# Patient Record
Sex: MALE | Employment: OTHER | URBAN - METROPOLITAN AREA
[De-identification: names, ages, dates, MRNs, and addresses within clinical notes are randomized per-mention and may not be internally consistent; named-entity substitution may affect disease eponyms.]

---

## 2019-02-11 ENCOUNTER — HOSPITAL ENCOUNTER (OUTPATIENT)
Dept: PHYSICAL THERAPY | Age: 54
Discharge: HOME | End: 2019-02-11
Payer: MEDICARE

## 2019-02-11 VITALS — WEIGHT: 312.8 LBS | HEIGHT: 72 IN | BODY MASS INDEX: 42.37 KG/M2

## 2019-02-11 PROCEDURE — 97162 PT EVAL MOD COMPLEX 30 MIN: CPT

## 2019-02-11 PROCEDURE — 97110 THERAPEUTIC EXERCISES: CPT

## 2019-02-11 PROCEDURE — 97140 MANUAL THERAPY 1/> REGIONS: CPT

## 2019-02-11 NOTE — PROGRESS NOTES
[facility]  Physical Therapy Lymphedema Clinic  [address] [facility], [id number] [place] [address], [facility] 22.    INITIAL EVALUATION    NAME: Adrian Griffin AGE: 52 y.o. GENDER: male  DATE: 2/11/2019  REFERRING PHYSICIAN: Dr. Tamy Ovalle  HISTORY AND BACKGROUND:   Primary Diagnosis:  · B LE lymphedema, secondary (I89.0)  Other Treatment Diagnoses:  · Abnormality of gait (R26.9)  · Swelling not relieved by elevation (R60.9)  ·  COPD (J44.9))  ·  Hyperlipidemia associated with type 2 diabetes mellitus (E11.69, E78.5)  · Morbid obesity (E66.01)  · Atherosclerosis of aorta (I70.0)  · Atrial fibrillation (HCC) (148.91  Date of Onset: 10/11/18  Present Symptoms and Functional Limitations: Patient reports swelling has been present in bilateral legs since at least 2010 and he does report that his sisters and brothers as well as parent have had swelling. He reports that he went to [facility] wound clinic in 2017 and he received compression stockings at that time (ulcer care garments?) that he was able to don and doff himself and he wore daily until they wore out. He reports they helped him heal 2 posterior right leg wounds at that time. He has not worn compression since. Patient reports he donned the stockings himself. He also reports donning and doffing his shoes himself currently. He was admitted to the hospital in September of 2018 secondary to confusion that was attributed to decreased O2 to the brain secondary to sleep apnea. He has a history of CHF and has a reported ejection fraction of 30-35% per study 10/2018. His condition is additionally complicated by reports of bilateral dislocated hips with patient using crutches to ambulate and a power chair for long distances. [facility] Lymphedema Assessment Scale: Score of 7 out of 20.   Past Medical History:   HTN  Atrial fibrillation  Heart Failure with reduced ejection fraction (EF 30-35%)  Atherosclerosis of aorta  COPD  Hyperlipidemia associated with type 2 diabetes mellitus  Morbid Obesity  Obstructive sleep apnea  Hernia  arthritis bilateral hips    Surgeries:  Hernia repair surgery 2010, bilateral THR with subsequent dislocation, Goltry filter placement 2010 ( patient denies having a DVT he reports this was precautionary)  Current Medications:     acetaminophen 500mg  Albuterol sulfate 2.5mg/3ml nebulizer solution  Atorvastatin 40mg   Glucose meter kit  Fluticasone 50mg nasal spary  Furosemide 20mg and 40 mg  Ipatropium bromide . 02% solution  Lancets  Metformin 500mg tablet  Metoprolol tartrate 100mg  Symbicort 160 mg HFA aerosol inhaler  Vontolin HFA 90mcg aerosol inhaler  Vitamin B-1 100mg tablet  Warfarin 7.5mg tablet     No current facility-administered medications for this encounter. Allergies: Penicillin   Prior Level of Function/Social/Work History/Personal factors and/or comorbidities impacting plan of care: Patient is originally from [place]. Worked in security in the past., has children and grandchildren, Mother passed away in  2017. Living Situation: Resides in private residence with steps to enter in the front and back, patient reports it is easier to enter from the back side of the house. Trainable Caregiver?: Yes, sister and granddaughter who reside with him. Self-care/ADLs: Modified independent     Mobility: Modified independent   Sleeping Arrangement: Sleeps in bed at night   Adaptive Equipment Owned: crutches   Other: Patient wears tie on sneakers and he is able to reach his feet to don and doff the products. Previous Therapy:  Traditional physical therapy in the home health setting and nursing wound care.   Compression/Lymphedema Equipment:  Patient reports having stockings in 2017 that he received from [facility] wound healing center that were a 2 layered garment with a zipper (ulcer care stocking?)  He no longer has the stockings but reports he could don and doff them himself. SUBJECTIVE:  I should have come to see you a long time ago. I think I might have 2 wounds on the back of my leg now. I have just been keeping them clean like they taught me. Patients goals for therapy: To get the swelling out of my leg especially the right leg. OBJECTIVE DATA SUMMARY:   EXAMINATION/PRESENTATION/DECISION MAKING:   Pain:    No complaints of swelling related pain today. Pain Scale 1: Numeric (0 - 10)     Pain Intensity 1: 0                 Patient Stated Pain Goal: 0       Self-Care and ADLs:  Grooming: Modified independent  Bathing: Modified independent with sponge bathe and soaking his feet.   Patient does not get into the bathtub/shower for safety reasons   UB Dressing: Modified independent  LB Dressing: Modified independent with increased time and effort   Don/Doff Shoes/Socks: Modified independent with increased time and effort, patient assisted with donning shoes in the clinic and he tied his left sneaker Toileting: Modified independent          Skin and Tissue Assessment:  Dermal Status:  [x]   Intact [x]  Dry   [x]  Tenuous right leg [] Flaky   []  Wound/lesion present []  Scars   []  Dermatitis Bilateral legs are scaly in appearance with right having patches of hyperkatosis on right lower leg and to a lesser extent on the left leg   Texture/Consistency:  []  Boggy []  Pitting Edema   []  Brawny []  Combination   [x]  Fibrotic moderately bilateral lower extremities    Pigmentation/Color Change:  []  Normal []  Hemosiderin   []  Red []  Erythematous   [x]  Hyperpigmented []  Hyperlipodermatosclerosis   Anomalies:  []  Lymphorrhea []  Vesicles   []  Petechiae []  Warty Vercusis   []  Bullae [x]  Papilloma multiple sites right leg   Circulatory:  []  TERRI []  Varicosities:   [x]  Pulses- pedal pulses intact bilateral lower extremities [x]  Vascular studies ruled out DVT in past 9/2018   []  DVT History    Nails:  []   Normal  [x]   Fungus    Right leg         Stemmers Sign: positive  Height:  Height: 6' (182.9 cm)  Weight:  Weight: 141.9 kg (312 lb 12.8 oz)   BMI:  BMI (calculated): 42.4  (36 or greater: adversely affecting lymphedema)  Scale was fluctuating dramatically while patient was standing on it due to his requiring assistance of a walker for support, the weight reported above was the most frequent presentation on the scale today. Wound/Ulcer:  None noted today      Wound Pain:   N/A  Volumetric Measurements:   Right:  20,611.38 mL Left:  20,276.34mL   % Difference: 1.65% right larger than left Dominance: right hand dominant   (See scanned graph)  Range of Motion: Decreased by 20-30% at bilateral ankles, knees and hips. Strength: Grossly 3-3+/5  for bilaterally lower extremities. Sensation:    [x] Intact    [] Impaired  Mobility:     Bed/Chair Mobility:  Standby assistance for supine to sit but Moderate Assistance to raise the legs onto the bed Transfers:  Contact guard assistance    Sitting Balance:  good Standing Balance:  Fair with support   Gait:  Patient did not bring his crutches to ambulate in the clinic used a walker for short distances with decreased step length, decreased waqar and decreased foot clearance as well as increased effort.  Wheelchair Mobility:  N/A patient reports he has a power chair at home but has difficulty transporting it due to his [employer] needing repairs   Endurance:  fair Stairs:  Not assessed, patient reports step to pattern and increased effort with railings needed to successfully ascend 4 steps        Safety:  Patient is alert and oriented:  Yes   Safety awareness:  Good   Fall Risk?:  High due to decreased lower extremity mobility, leg swelling, and dislocated hips   Patient given written fall prevention handout: Yes   Precautions:  Standard lymphedema precautions to include avoiding blood pressure readings, injections and IVs or other procedures/acts that could lead to broken skin on affected area, and avoiding excessive heat, resistive activity or altitude without compression garment       Based on the above components, the patient evaluation is determined to be of the following complexity level: MEDIUM    Evaluation Time: 4113-9120  45 minutes    TREATMENT PROVIDED:   1. Treatment description:  Therapeutic Exercise and Procedure: Patient instructed in activity restrictions and exercise guidelines. Therapist demonstrated deep abdominal breathing and a remedial lymphedema range of motion exercise program to be done in sitting. Modifications were made to the program to remove the bike exercise and resisted hip flexion. Patient was able to complete the routine times 5 reps and deep abdominal breathing with fair performance. Patient has been asked to complete breathing exercises and the decongestive exercise routine daily. Treatment time:  3355-6974  Minutes: 15    2. Treatment description:  Manual Therapy: Patient instructed in skin care principles and anatomy of the lymphatic system. Therapist able to demonstrate manual lymphatic drainage techniques for the drainage of bilateral lower extremities and skin care protocol: using low pH lotion (Remedy lotion). Enforced the need for daily skin care with patient due to his skin status and educated him on his increased risk for skin breakdown. Educated patient in the care of wounds and to perform skin checks daily. Patient reports he has a long handled mirror to check his skin. Instructed him to bathe any open areas with soap and water, apply antibiotic ointment and cover the sites to prevent infection. Educated patient in the signs and symptoms of cellulitis infection and to contact his physician should any symptoms be present. Discussed patient's cardiac status at length with him and that multi-layer compression bandaging would be too aggressive with regard to fluid movement and his cardiac status.   Discussed slow progression of treatment components with education in skin care, elevation and manual lymph drainage followed by progression to include manual lymph drainage education and possibly compression garments. Discussed compression garments with patient and prices of the products as they are not typically covered under Medicare plans. Discussed patient's leg size and he may require a velcro style garment for if reasons as he may not fit into a non custom stocking. Therapist will investigate this for him. Patient expresses that cost is an issue. Patient reports that he would have assistance at home from his sister and granddaughter if needed. Treatment time:  8807-2301  Minutes: 30    ASSESSMENT:   Eboni ConnellLibra Anthony is a 52 y.o. male who presents with bilateral lower extremity secondary lymphedema (right lower extremity stage 3, left lower extremity late stage 2) and a significant cardiac history. Patient does have a history of right lower extremity leg wounds and infection and significantly altered skin status is noted today. Patient has significant mobility limitations and this combined with his cardiac history results in his not being a good candidate for safe multi-layer bandaging. He may be able to use some mild compression in the future. His condition is complicated by a history of CHF with reduced ejection fraction, atrial fibrillation, atherosclerosis of the aorta, morbid obesity and COPD. Patient will benefit from complete decongestive therapy (CDT) including a slow progression of treatment to potentially include manual lymphatic drainage (MLD) technique and a compression system to decongest limb. Patient will receive instruction in proper skin care to recognize signs/symptoms of and prevent infection, therapeutic exercise, and self-MLD for independent home program and restorative lymphatic performance. This care is medically necessary due to the infection risk with lymphedema and to improve functional activities.   CDT is necessary to resolve swelling to allow patient to return to wearing normal clothes/footwear and prevent worsening of symptoms, such as venous stasis ulcerations, infections, or hospitalizations. Patient will be independent with home program strategies to allow improved ADL ability and mobility and to allow patient to return to greatest functional independence. Rehabilitation potential is considered to be fair. Factors which may influence rehabilitation potential include patient's medical status and home compliance with treatment reecommendations. Patient will benefit from 8-10 physical therapy visits over 12-14 weeks to optimize improvement in these areas. PLAN OF CARE:   Recommendations and Planned Interventions:  Manual lymph drainage/complete decongestive therapy  Compression garment fitting/provision  Lymphedema therapeutic exercise  AROM/PROM/Strength/Coordination  Self-care training  Functional mobility training  Education in skin care and lymphedema precautions  Self-MLD education per home program  Self-bandaging education per home program  Caregiver education as needed  Wound care as needed     GOALS  Short term goals  Time frame: 315/19  1. Patient will demonstrate knowledge of signs/symptoms of infections/cellulitis and be independent in skin care to prevent cellulitis. 2.  Patient will demonstrate independence in lymphedema home program of therapeutic exercises to improve circulation and decongest limb to improve ADLs. 3.  Patient will be measured for appropriate compression garments for bilateral lower extremities to help improve skin integrity and minimize worsening of swelling over time. Long term goals  Time frame: 5/8/19  1. Pt will show improvement in the [facility] Lymphedema Assessment Scale by decreasing the score to 5 and thus allow improvement in patient's quality of life. 2.  Patient will be independent with don/doff of compression system and use in order to prevent reaccumulation of fluid at discharge.   3.  Pt will be independent in self-MLD and show stable limb volumes showing decongestion and pt. ready for transition to independent restorative phase of lymphedema therapy. Patient has participated in goal setting and agrees to work toward plan of care. Patient was instructed to call if questions or concerns arise. Thank you for this referral.   SHELLY Bales, CLT-[facility]   Time Calculation: 90 mins    TREATMENT PLAN EFFECTIVE DATES:   2/11/2019 TO 5/8/19  I have read the above plan of care for Wan Arley Griffin. I certify the above prescribed services are required by this patient and are medically necessary.   The above plan of care has been developed in conjunction with the lymphedema/physical therapist.       Physician Signature: ____________________________________Date:______________                                   Dr. Kimberly Luciano

## 2019-05-17 ENCOUNTER — HOSPITAL ENCOUNTER (OUTPATIENT)
Dept: NUCLEAR MEDICINE | Age: 54
Discharge: HOME | DRG: 683 | End: 2019-05-17
Attending: UROLOGY
Payer: MEDICARE

## 2019-05-17 ENCOUNTER — HOSPITAL ENCOUNTER (INPATIENT)
Age: 54
LOS: 4 days | Discharge: HOME | DRG: 683 | End: 2019-05-21
Attending: EMERGENCY MEDICINE | Admitting: FAMILY MEDICINE
Payer: MEDICARE

## 2019-05-17 ENCOUNTER — APPOINTMENT (OUTPATIENT)
Dept: GENERAL RADIOLOGY | Age: 54
DRG: 683 | End: 2019-05-17
Attending: FAMILY MEDICINE
Payer: MEDICARE

## 2019-05-17 ENCOUNTER — HOSPITAL ENCOUNTER (OUTPATIENT)
Dept: CT IMAGING | Age: 54
Discharge: HOME | DRG: 683 | End: 2019-05-17
Attending: UROLOGY
Payer: MEDICARE

## 2019-05-17 DIAGNOSIS — C61 PROSTATE CANCER (HCC): ICD-10-CM

## 2019-05-17 DIAGNOSIS — C61 MALIGNANT NEOPLASM OF PROSTATE (HCC): ICD-10-CM

## 2019-05-17 DIAGNOSIS — N17.9 ACUTE RENAL FAILURE, UNSPECIFIED ACUTE RENAL FAILURE TYPE (HCC): Primary | ICD-10-CM

## 2019-05-17 DIAGNOSIS — N32.0 BLADDER OUTLET OBSTRUCTION: ICD-10-CM

## 2019-05-17 DIAGNOSIS — N13.30 HYDRONEPHROSIS, UNSPECIFIED HYDRONEPHROSIS TYPE: ICD-10-CM

## 2019-05-17 PROBLEM — D64.9 ANEMIA: Status: ACTIVE | Noted: 2019-05-17

## 2019-05-17 PROBLEM — N18.9 ACUTE KIDNEY INJURY SUPERIMPOSED ON CHRONIC KIDNEY DISEASE (HCC): Status: ACTIVE | Noted: 2019-05-17

## 2019-05-17 LAB
ALBUMIN SERPL-MCNC: 2.9 G/DL (ref 3.5–5)
ALBUMIN/GLOB SERPL: 0.7 {RATIO} (ref 1.1–2.2)
ALP SERPL-CCNC: 464 U/L (ref 45–117)
ALT SERPL-CCNC: 12 U/L (ref 12–78)
ANION GAP SERPL CALC-SCNC: 12 MMOL/L (ref 5–15)
APPEARANCE UR: CLEAR
AST SERPL-CCNC: 28 U/L (ref 15–37)
BACTERIA URNS QL MICRO: NEGATIVE /HPF
BASOPHILS # BLD: 0 K/UL (ref 0–0.1)
BASOPHILS NFR BLD: 0 % (ref 0–1)
BILIRUB SERPL-MCNC: 0.8 MG/DL (ref 0.2–1)
BILIRUB UR QL: NEGATIVE
BUN SERPL-MCNC: 31 MG/DL (ref 6–20)
BUN/CREAT SERPL: 8 (ref 12–20)
CALCIUM SERPL-MCNC: 8.8 MG/DL (ref 8.5–10.1)
CHLORIDE SERPL-SCNC: 107 MMOL/L (ref 97–108)
CO2 SERPL-SCNC: 22 MMOL/L (ref 21–32)
COLOR UR: ABNORMAL
COMMENT, HOLDF: NORMAL
CREAT SERPL-MCNC: 3.78 MG/DL (ref 0.7–1.3)
DIFFERENTIAL METHOD BLD: ABNORMAL
EOSINOPHIL # BLD: 0.2 K/UL (ref 0–0.4)
EOSINOPHIL NFR BLD: 3 % (ref 0–7)
EPITH CASTS URNS QL MICRO: ABNORMAL /LPF
ERYTHROCYTE [DISTWIDTH] IN BLOOD BY AUTOMATED COUNT: 16.3 % (ref 11.5–14.5)
GLOBULIN SER CALC-MCNC: 4 G/DL (ref 2–4)
GLUCOSE BLD STRIP.AUTO-MCNC: 85 MG/DL (ref 65–100)
GLUCOSE SERPL-MCNC: 87 MG/DL (ref 65–100)
GLUCOSE UR STRIP.AUTO-MCNC: NEGATIVE MG/DL
HCT VFR BLD AUTO: 27.4 % (ref 36.6–50.3)
HGB BLD-MCNC: 8.7 G/DL (ref 12.1–17)
HGB UR QL STRIP: NEGATIVE
HYALINE CASTS URNS QL MICRO: ABNORMAL /LPF (ref 0–5)
IMM GRANULOCYTES # BLD AUTO: 0 K/UL
IMM GRANULOCYTES NFR BLD AUTO: 0 %
KETONES UR QL STRIP.AUTO: NEGATIVE MG/DL
LEUKOCYTE ESTERASE UR QL STRIP.AUTO: ABNORMAL
LYMPHOCYTES # BLD: 1.1 K/UL (ref 0.8–3.5)
LYMPHOCYTES NFR BLD: 19 % (ref 12–49)
MCH RBC QN AUTO: 30 PG (ref 26–34)
MCHC RBC AUTO-ENTMCNC: 31.8 G/DL (ref 30–36.5)
MCV RBC AUTO: 94.5 FL (ref 80–99)
MONOCYTES # BLD: 0.5 K/UL (ref 0–1)
MONOCYTES NFR BLD: 9 % (ref 5–13)
NEUTS SEG # BLD: 3.9 K/UL (ref 1.8–8)
NEUTS SEG NFR BLD: 69 % (ref 32–75)
NITRITE UR QL STRIP.AUTO: NEGATIVE
NRBC # BLD: 0 K/UL (ref 0–0.01)
NRBC BLD-RTO: 0 PER 100 WBC
PH UR STRIP: 6 [PH] (ref 5–8)
PLATELET # BLD AUTO: 144 K/UL (ref 150–400)
PMV BLD AUTO: 9.9 FL (ref 8.9–12.9)
POTASSIUM SERPL-SCNC: 3.7 MMOL/L (ref 3.5–5.1)
PROT SERPL-MCNC: 6.9 G/DL (ref 6.4–8.2)
PROT UR STRIP-MCNC: ABNORMAL MG/DL
RBC # BLD AUTO: 2.9 M/UL (ref 4.1–5.7)
RBC #/AREA URNS HPF: ABNORMAL /HPF (ref 0–5)
RBC MORPH BLD: ABNORMAL
SAMPLES BEING HELD,HOLD: NORMAL
SERVICE CMNT-IMP: NORMAL
SODIUM SERPL-SCNC: 141 MMOL/L (ref 136–145)
SP GR UR REFRACTOMETRY: 1.01 (ref 1–1.03)
UR CULT HOLD, URHOLD: NORMAL
UROBILINOGEN UR QL STRIP.AUTO: 0.2 EU/DL (ref 0.2–1)
WBC # BLD AUTO: 5.7 K/UL (ref 4.1–11.1)
WBC URNS QL MICRO: ABNORMAL /HPF (ref 0–4)

## 2019-05-17 PROCEDURE — 74011250636 HC RX REV CODE- 250/636

## 2019-05-17 PROCEDURE — 99283 EMERGENCY DEPT VISIT LOW MDM: CPT

## 2019-05-17 PROCEDURE — 93005 ELECTROCARDIOGRAM TRACING: CPT

## 2019-05-17 PROCEDURE — 74011250637 HC RX REV CODE- 250/637: Performed by: FAMILY MEDICINE

## 2019-05-17 PROCEDURE — 81001 URINALYSIS AUTO W/SCOPE: CPT

## 2019-05-17 PROCEDURE — 65660000000 HC RM CCU STEPDOWN

## 2019-05-17 PROCEDURE — 80053 COMPREHEN METABOLIC PANEL: CPT

## 2019-05-17 PROCEDURE — 74011250636 HC RX REV CODE- 250/636: Performed by: FAMILY MEDICINE

## 2019-05-17 PROCEDURE — 36415 COLL VENOUS BLD VENIPUNCTURE: CPT

## 2019-05-17 PROCEDURE — 74176 CT ABD & PELVIS W/O CONTRAST: CPT

## 2019-05-17 PROCEDURE — 82962 GLUCOSE BLOOD TEST: CPT

## 2019-05-17 PROCEDURE — 78306 BONE IMAGING WHOLE BODY: CPT

## 2019-05-17 PROCEDURE — 85025 COMPLETE CBC W/AUTO DIFF WBC: CPT

## 2019-05-17 RX ORDER — HYDRALAZINE HYDROCHLORIDE 20 MG/ML
INJECTION INTRAMUSCULAR; INTRAVENOUS
Status: COMPLETED
Start: 2019-05-17 | End: 2019-05-17

## 2019-05-17 RX ORDER — ALBUTEROL SULFATE 90 UG/1
1 AEROSOL, METERED RESPIRATORY (INHALATION)
Status: ON HOLD | COMMUNITY
Start: 2019-05-10 | End: 2019-05-21 | Stop reason: SDUPTHER

## 2019-05-17 RX ORDER — ATORVASTATIN CALCIUM 40 MG/1
40 TABLET, FILM COATED ORAL
Status: DISCONTINUED | OUTPATIENT
Start: 2019-05-18 | End: 2019-05-21 | Stop reason: HOSPADM

## 2019-05-17 RX ORDER — BICALUTAMIDE 50 MG/1
TABLET, FILM COATED ORAL
Refills: 6 | COMMUNITY
Start: 2019-05-16 | End: 2019-05-17

## 2019-05-17 RX ORDER — FUROSEMIDE 40 MG/1
40 TABLET ORAL DAILY
COMMUNITY
Start: 2019-05-10 | End: 2019-05-21

## 2019-05-17 RX ORDER — INSULIN LISPRO 100 [IU]/ML
INJECTION, SOLUTION INTRAVENOUS; SUBCUTANEOUS
Status: DISCONTINUED | OUTPATIENT
Start: 2019-05-17 | End: 2019-05-21 | Stop reason: HOSPADM

## 2019-05-17 RX ORDER — SODIUM CHLORIDE 0.9 % (FLUSH) 0.9 %
5-40 SYRINGE (ML) INJECTION EVERY 8 HOURS
Status: DISCONTINUED | OUTPATIENT
Start: 2019-05-18 | End: 2019-05-21 | Stop reason: HOSPADM

## 2019-05-17 RX ORDER — ALBUTEROL SULFATE 0.83 MG/ML
2.5 SOLUTION RESPIRATORY (INHALATION)
COMMUNITY
Start: 2019-05-10

## 2019-05-17 RX ORDER — BICALUTAMIDE 50 MG/1
50 TABLET, FILM COATED ORAL DAILY
Status: DISCONTINUED | OUTPATIENT
Start: 2019-05-18 | End: 2019-05-21 | Stop reason: HOSPADM

## 2019-05-17 RX ORDER — FLUTICASONE PROPIONATE 50 MCG
2 SPRAY, SUSPENSION (ML) NASAL DAILY
Status: ON HOLD | COMMUNITY
Start: 2019-05-10 | End: 2019-05-21 | Stop reason: SDUPTHER

## 2019-05-17 RX ORDER — METFORMIN HYDROCHLORIDE 500 MG/1
TABLET ORAL
COMMUNITY
Start: 2019-05-10 | End: 2019-05-17

## 2019-05-17 RX ORDER — MAGNESIUM SULFATE 100 %
4 CRYSTALS MISCELLANEOUS AS NEEDED
Status: DISCONTINUED | OUTPATIENT
Start: 2019-05-17 | End: 2019-05-21 | Stop reason: HOSPADM

## 2019-05-17 RX ORDER — DEXTROSE 50 % IN WATER (D50W) INTRAVENOUS SYRINGE
25-50 AS NEEDED
Status: DISCONTINUED | OUTPATIENT
Start: 2019-05-17 | End: 2019-05-21 | Stop reason: HOSPADM

## 2019-05-17 RX ORDER — IPRATROPIUM BROMIDE 0.5 MG/2.5ML
0.5 SOLUTION RESPIRATORY (INHALATION)
COMMUNITY
Start: 2019-05-10

## 2019-05-17 RX ORDER — FUROSEMIDE 20 MG/1
20 TABLET ORAL DAILY
COMMUNITY
Start: 2019-05-10 | End: 2019-05-21

## 2019-05-17 RX ORDER — METOPROLOL SUCCINATE 50 MG/1
100 TABLET, EXTENDED RELEASE ORAL DAILY
Status: DISCONTINUED | OUTPATIENT
Start: 2019-05-18 | End: 2019-05-21 | Stop reason: HOSPADM

## 2019-05-17 RX ORDER — BICALUTAMIDE 50 MG/1
50 TABLET, FILM COATED ORAL DAILY
COMMUNITY

## 2019-05-17 RX ORDER — HYDRALAZINE HYDROCHLORIDE 20 MG/ML
10 INJECTION INTRAMUSCULAR; INTRAVENOUS ONCE
Status: COMPLETED | OUTPATIENT
Start: 2019-05-17 | End: 2019-05-17

## 2019-05-17 RX ORDER — HEPARIN SODIUM 5000 [USP'U]/ML
5000 INJECTION, SOLUTION INTRAVENOUS; SUBCUTANEOUS EVERY 8 HOURS
Status: DISCONTINUED | OUTPATIENT
Start: 2019-05-17 | End: 2019-05-21 | Stop reason: HOSPADM

## 2019-05-17 RX ORDER — SODIUM CHLORIDE 0.9 % (FLUSH) 0.9 %
5-40 SYRINGE (ML) INJECTION AS NEEDED
Status: DISCONTINUED | OUTPATIENT
Start: 2019-05-17 | End: 2019-05-21 | Stop reason: HOSPADM

## 2019-05-17 RX ORDER — METOPROLOL SUCCINATE 100 MG/1
TABLET, EXTENDED RELEASE ORAL
Refills: 0 | Status: ON HOLD | COMMUNITY
Start: 2019-05-16 | End: 2019-05-21 | Stop reason: SDUPTHER

## 2019-05-17 RX ORDER — ATORVASTATIN CALCIUM 40 MG/1
40 TABLET, FILM COATED ORAL DAILY
COMMUNITY
Start: 2019-05-10

## 2019-05-17 RX ADMIN — ATORVASTATIN CALCIUM 40 MG: 40 TABLET, FILM COATED ORAL at 23:32

## 2019-05-17 RX ADMIN — HYDRALAZINE HYDROCHLORIDE 10 MG: 20 INJECTION INTRAMUSCULAR; INTRAVENOUS at 21:51

## 2019-05-17 RX ADMIN — Medication 10 ML: at 23:33

## 2019-05-17 RX ADMIN — HEPARIN SODIUM 5000 UNITS: 5000 INJECTION INTRAVENOUS; SUBCUTANEOUS at 23:32

## 2019-05-17 NOTE — H&P
History & Physical 
 
Date of admission: 5/17/2019 Patient name: Sal Bond MRN: [id number] YOB: 1965 Age: 62 y.o. Primary care provider:  Justin Griffith MD  
 
Source of Information: patient, ED and electronic medical records Chief complaint:  Abnormal CT scan results History of present illness Sal Bond is a 62 y.o.  male with past medical history of atrial fibrillation, CHF, COPD, type 2 DM, hypertension, prostate enlargement presented to the ED from outpatient radiology with abnormal CT abdomen/ pelvis. Medical records are limited and patient is a limited historian. Patient's girlfriend provided some limited history. Remainder of history was obtained per review of ED records. Per these collective reports,  patient has known history of an enlarged prostate. Patient was reportedly seen by his PCP on 5/13/2019. He notedly had a elevated PSA. Patient was referred to and seen by urologist Dr. Gayatri Garcia on 5/14/2019 and prescribed Casodex for presumed prostate cancer. Patient reportedly was seen again by his PCP on 5/16/2019 and labs were done. He was scheduled for CT abdomen/ pelvis today. Labs showed elevated elevated creatinine. Today, CT abdomen/pelvis without contrast suggested metastatic prostate cancer with sherrie and osseous metastates, bilateral hydroureteronephrosis with bladder outlet obstruction. ED then consulted hospitalist.   
 
There were no reports of new onset symptoms of syncope, loss of consciousness, headache, neck pain, visual disturbance, numbness, paresthesias, focal weakness, slurred speech, facial droop, shortness of breath (compared to baseline), chest pain, palpitations, abdominal pain, nausea, vomiting, diarrhea, calf pain, increased leg swelling/ edema, fever, chills. Past Medical History:  
Diagnosis Date  Atrial fibrillation ([facility] [address]  CHF (congestive heart failure) ([facility] [address]  Chronic obstructive pulmonary disease ([facility] [address]  Diabetes ([facility] [address] pre-diabetic on metformin  Hypertension  Prostate enlargement -  Prostate cancer 
 -  CKD -  Right thigh abscess- s/p I&D abscess 2010 in [place] -  Lymphedema. Seen by PT for the same 
 -  RLE DVT. S/p IVC filter in 2019 due to bleeding associated with extensive infection RLE 
 -  Morbid obesity -  Chronic left hip dislocation s/p L THR 
 -  DJD 
 -  Gait abnormality 
 -  Ventral and inguinal hernias- s/p repair PAST SURGICAL HISTORY: 
Past Surgical History:  
Procedure Laterality Date  HX HERNIA REPAIR    
 ventral and inguinal  
 HX ORTHOPAEDIC    
 hip surgery MEDICATIONS: 
Prior to Admission medications Medication Sig Start Date End Date Taking? Authorizing Provider  
albuterol (PROVENTIL VENTOLIN) 2.5 mg /3 mL (0.083 %) nebulizer solution  5/10/19   Other, MD Melba  
VENTOLIN HFA 90 mcg/actuation inhaler  5/10/19   Other, MD Melba  
atorvastatin (LIPITOR) 40 mg tablet  5/10/19   Other, MD Melba  
bicalutamide (CASODEX) 50 mg tablet TK 1 T PO QD 5/16/19   Other, MD Melba  
fluticasone propionate (FLONASE) 50 mcg/actuation nasal spray  5/10/19   Other, MD Melba  
furosemide (LASIX) 20 mg tablet  5/10/19   Other, MD Melba  
furosemide (LASIX) 40 mg tablet  5/10/19   Other, MD Melba  
ipratropium (ATROVENT) 0.02 % soln  5/10/19   Other, MD Melba  
metoprolol succinate (TOPROL-XL) 100 mg tablet TK 1 T PO QD 5/16/19   Other, MD Melba  
metFORMIN (GLUCOPHAGE) 500 mg tablet  5/10/19   Other, MD Melba  
 
ALLERGIES: 
NO KNOWN DRUG ALLERGIES Social history Patient resides X  with girlfriend Ambulates 
x  crutches Alcohol history  
x  occasional alcohol binges 3-4 beers / day on weekends but denies daily consumption Smoking history 
x  denies Social History Tobacco Use  
 Smoking Status Never Smoker Smokeless Tobacco Never Used Code status 
x  Full code Review of systems Pertinent positives as noted in HPI. All other systems were reviewed and were negative Physical Examination Visit Vitals BP (!) 178/107 Pulse 65 Temp 97.8 °F (36.6 °C) Resp 15 SpO2 98% General:  Morbidly obese male in no acute respiratory distress Head:  Normocephalic, without obvious abnormality, atraumatic Eyes:  Conjunctivae/corneas clear. PERRL, EOMs intact E/N/M/T: Nares normal. Septum midline. No nasal drainage or sinus tenderness Tongue midline/ non-edematous Clear oropharynx Neck: Normal appearance and movements, symmetrical, trachea midline No palpable adenopathy No thyroid enlargement, tenderness or nodules No carotid bruit No JVP Lungs:   Symmetrical chest expansion and respiratory effort Clear to auscultation bilaterally Chest wall:  No tenderness or deformity Heart:  Regular rate and rhythm Sounds normal; no murmur, click, rub or gallop Abdomen:   Soft, obese No tenderness No rebound, guarding, or rigidity Non-distended Bowel sounds normal 
Ventral / supraumbilical prominence Old scar infraumbilical region No auscultated abdominal bruits No pulsatile abdominal mass Back: No CVA tenderness Extremities: Massive lymphedema with chronic venous stasis discoloration of both legs and hardening of skin Vascular/ 
Pulses: 2+ radial/ 1+ DP bilateral pulses Skin: No rashes or ulcers Warm/ dry Musculo- 
    skeletal: Gait not tested Limited range of motion of BLE and required assistance both standing and transferring to stretcher from wheelchair Neuro: GCS 15. Moves all extremities x 4. No slurred speech. No facial droop. Sensation grossly intact except decreased on tactile stimuli on plantar surface of both feet Psych: Alert, oriented x 3 Data Review 24 Hour Results: Recent Results (from the past 24 hour(s)) CBC WITH AUTOMATED DIFF Collection Time: 05/17/19  3:04 PM  
Result Value Ref Range WBC 5.7 4.1 - 11.1 K/uL  
 RBC 2.90 (L) 4.10 - 5.70 M/uL HGB 8.7 (L) 12.1 - 17.0 g/dL HCT 27.4 (L) 36.6 - 50.3 % MCV 94.5 80.0 - 99.0 FL  
 MCH 30.0 26.0 - 34.0 PG  
 MCHC 31.8 30.0 - 36.5 g/dL  
 RDW 16.3 (H) 11.5 - 14.5 % PLATELET 866 (L) 690 - 400 K/uL MPV 9.9 8.9 - 12.9 FL  
 NRBC 0.0 0  WBC ABSOLUTE NRBC 0.00 0.00 - 0.01 K/uL NEUTROPHILS 69 32 - 75 % LYMPHOCYTES 19 12 - 49 % MONOCYTES 9 5 - 13 % EOSINOPHILS 3 0 - 7 % BASOPHILS 0 0 - 1 % IMMATURE GRANULOCYTES 0 %  
 ABS. NEUTROPHILS 3.9 1.8 - 8.0 K/UL  
 ABS. LYMPHOCYTES 1.1 0.8 - 3.5 K/UL  
 ABS. MONOCYTES 0.5 0.0 - 1.0 K/UL  
 ABS. EOSINOPHILS 0.2 0.0 - 0.4 K/UL  
 ABS. BASOPHILS 0.0 0.0 - 0.1 K/UL  
 ABS. IMM. GRANS. 0.0 K/UL  
 DF MANUAL    
 RBC COMMENTS ANISOCYTOSIS 
1+ METABOLIC PANEL, COMPREHENSIVE Collection Time: 05/17/19  3:04 PM  
Result Value Ref Range Sodium 141 136 - 145 mmol/L Potassium 3.7 3.5 - 5.1 mmol/L Chloride 107 97 - 108 mmol/L  
 CO2 22 21 - 32 mmol/L Anion gap 12 5 - 15 mmol/L Glucose 87 65 - 100 mg/dL BUN 31 (H) 6 - 20 MG/DL Creatinine 3.78 (H) 0.70 - 1.30 MG/DL  
 BUN/Creatinine ratio 8 (L) 12 - 20 GFR est AA 20 (L) >60 ml/min/1.73m2 GFR est non-AA 17 (L) >60 ml/min/1.73m2 Calcium 8.8 8.5 - 10.1 MG/DL Bilirubin, total 0.8 0.2 - 1.0 MG/DL  
 ALT (SGPT) 12 12 - 78 U/L  
 AST (SGOT) 28 15 - 37 U/L Alk. phosphatase 464 (H) 45 - 117 U/L Protein, total 6.9 6.4 - 8.2 g/dL Albumin 2.9 (L) 3.5 - 5.0 g/dL Globulin 4.0 2.0 - 4.0 g/dL A-G Ratio 0.7 (L) 1.1 - 2.2 SAMPLES BEING HELD Collection Time: 05/17/19  3:04 PM  
Result Value Ref Range SAMPLES BEING HELD 1red, 1blu 1PST COMMENT   Add-on orders for these samples will be processed based on acceptable specimen integrity and analyte stability, which may vary by analyte. URINALYSIS W/MICROSCOPIC Collection Time: 05/17/19  5:03 PM  
Result Value Ref Range Color YELLOW/STRAW Appearance CLEAR CLEAR Specific gravity 1.008 1.003 - 1.030    
 pH (UA) 6.0 5.0 - 8.0 Protein TRACE (A) NEG mg/dL Glucose NEGATIVE  NEG mg/dL Ketone NEGATIVE  NEG mg/dL Bilirubin NEGATIVE  NEG Blood NEGATIVE  NEG Urobilinogen 0.2 0.2 - 1.0 EU/dL Nitrites NEGATIVE  NEG Leukocyte Esterase TRACE (A) NEG    
 WBC 0-4 0 - 4 /hpf  
 RBC 0-5 0 - 5 /hpf Epithelial cells FEW FEW /lpf Bacteria NEGATIVE  NEG /hpf Hyaline cast 0-2 0 - 5 /lpf URINE CULTURE HOLD SAMPLE Collection Time: 05/17/19  5:03 PM  
Result Value Ref Range Urine culture hold URINE ON HOLD IN MICROBIOLOGY DEPT FOR 3 DAYS. IF UNPRESERVED URINE IS SUBMITTED, IT CANNOT BE USED FOR ADDITIONAL TESTING AFTER 24 HRS, RECOLLECTION WILL BE REQUIRED. Recent Labs 05/17/19 
1504 WBC 5.7 HGB 8.7* HCT 27.4*  
* Recent Labs 05/17/19 
1504   
K 3.7  CO2 22 GLU 87 BUN 31* CREA 3.78* CA 8.8 ALB 2.9* TBILI 0.8 SGOT 28 ALT 12 Imaging CT ABD PELV WO CONT 
  
INDICATION: Malignant neoplasm of prostate (HCC) 
  
COMPARISON: None. 
  
CONTRAST:  None. 
  
TECHNIQUE:  
Thin axial images were obtained through the abdomen and pelvis. Coronal and 
sagittal reconstructions were generated. Oral contrast was not administered. CT 
dose reduction was achieved through use of a standardized protocol tailored for 
this examination and automatic exposure control for dose modulation.  
  
The absence of intravenous contrast material reduces the sensitivity for 
evaluation of the solid parenchymal organs of the abdomen.  
  
FINDINGS:  
LUNG BASES: Clear. INCIDENTALLY IMAGED HEART AND MEDIASTINUM: Normal in size. Small pericardial 
fluid. LIVER: No mass or biliary dilatation. GALLBLADDER: Unremarkable. SPLEEN: No mass. PANCREAS: No mass or ductal dilatation. ADRENALS: Unremarkable. KIDNEYS/URETERS: Bilateral hydronephrosis and hydroureter extending to the 
urinary bladder. STOMACH: Unremarkable. SMALL BOWEL: No dilatation or wall thickening. COLON: No dilatation or wall thickening. APPENDIX: Unremarkable. PERITONEUM: No ascites or pneumoperitoneum. RETROPERITONEUM: There is retroperitoneal adenopathy with 1.8 x 1.8 cm left 
periaortic node (3, 47), 1.5 x 2.1 cm posterior aortocaval node (3, 49), 1.4 x 
2.2 cm anterior aortocaval node (3, 56), probable 2.1 x 3.1 cm aortic 
bifurcation node (3, 62), 2.2 x 2.2 cm right common iliac node (3, 66), 1.5 x 
2.2 cm left common iliac node (3, 65), 1.5 x 3.1 cm left pelvic sidewall node 
(3, 70), 1.6 x 2.3 cm anterior right pelvic sidewall node (3, 74), 3.9 x 4.0 cm 
right pelvic sidewall lymph node (3, 75), and 2.8 x 3.7 cm left inguinal region 
node (3, 84). No aneurysm. IVC filter noted. REPRODUCTIVE ORGANS: Partially obscured by streak artifact from bilateral hip 
arthroplasty prostheses. Prostate appears prominent and may project into the 
bladder base. URINARY BLADDER: No mass or calculus. BONES: Diffuse sclerotic metastases. There is dislocation with superior and 
posterior displacement of the left hip arthroplasty prosthesis and lateral 
subluxation of the femoral component of the right hip arthroplasty prosthesis 
which is eccentrically located superiorly in the acetabular component. ADDITIONAL COMMENTS: Intensive venous collaterals in the anterior abdominal 
wall. 
  
IMPRESSION: 
  
1. Extensive sherrie and osseous metastases likely reflective of metastatic 
prostate neoplasm. 2. Bilateral hydronephrosis likely reflecting urinary bladder outlet obstruction 
given suspected prostate cancer projection into the bladder base. 3. IVC filter with extensive superficial abdominal wall collaterals likely reflects IVC occlusion at the level of filter, but this is not well evaluated on 
unenhanced exam. 
4. Dislocation of left hip arthroplasty prosthesis and capsular wear with 
superior and lateral subluxation of right hip arthroplasty prosthesis. Assessment and Plan 1. Extensive sherrie and osseous metastases likely reflective of metastatic 
prostate neoplasm. 
-  Admit to remote telemetry -  Consult urologist stat -  Currently on Casodex per urologist Dr. Garald Spatz -  Discussed CT scan results with patient and his girlfriend and I answered their questions -  Consult oncologist 
 
2. Bilateral hydronephrosis likely reflecting urinary bladder outlet obstruction 
given suspected prostate cancer projection into the bladder base. -  Plan as above 3. IVC filter with extensive superficial abdominal wall collaterals likely 
reflects IVC occlusion at the level of filter, but this is not well evaluated on 
unenhanced exam. 
-  Likely chronic given presence of collaterals noted on CT 4. Dislocation of left hip arthroplasty prosthesis and capsular wear with 
superior and lateral subluxation of right hip arthroplasty prosthesis. -  notedly chronic since 2000 with no plans for further hip replacements d/t multiple co-morbidities per patient report 5. Acute superimposed on chronic kidney disease - stage 4.   
-   Consult nephrologist in a.m. -   Repeat renal panel -   Place on strict Is & Os  
-   Daily weights -   Order bladder scan 6. Anemia.   
-  Repeat H/H 
-  Check iron profile/ B12/ folate levels -  Order stool occult blood test 
 
7. Thrombocytopenia- mild. -  Repeat platelet count 8. Elevated alkaline phosphotase. Likely reflecting osseous metastatic disease. 9.  History of CHF. -  Order 2d echocardiogram in a.m. -  Consider for cardiologist consultation 10. COPD. No acute exacerbation -  Duoneb treatments prn 11. Hypertension. Currently uncontrolled -  Resume home medication on Toprol XL 
-  Order additional anti-hypertensive therapy as needed -  Monitor BP closely 12. Atrial fibrillation -  Ordered stat 12 lead EKG 
 -  Not oral anti-coagulant therapy 13. Lymphedema -  Chronic 14. Type 2 DM. -  Order Humalog insulin correctional coverage, scheduled blood glucose checks and check hemoglobin A1c level. 15.  Generalized weakness/ physical debility- with gait abnormality 
 -   Fall precautions Diet: renal/ diabetic Activity: OOB as tolerated with assistance DVT prophylaxis: SCDs contraindicated with h/o DVT/ massive lymphedema with occluded IVC; as still at risk for PE with sedentary patient, will order Heparin 5,000 units sq q 8 hours but monitor platelet counts closely.  
  
 
 
Signed by: Michelle Jessica MD  
 May 17, 2019 at 7:29 PM

## 2019-05-17 NOTE — ED PROVIDER NOTES
Initial Complaint: referral from CT for IV hydration. PCP called patient while he was still in radiology and advised him that his kidney function was elevated and needed to be seen. Started: today Endorses: no symptoms. Was sent for CT to evaluate his prostate. Denies: any symptoms No further complaints. Past Medical History: 
No date: CHF (congestive heart failure) ([facility] [address] No date: Chronic obstructive pulmonary disease ([facility] [address] No date: Diabetes ([facility] [address] Comment:  pre-diabetic on metformin No date: Hypertension No date: Prostate enlargement Past Surgical History: 
No date: HX HERNIA REPAIR Comment:  ventral and inguinal 
No date: HX ORTHOPAEDIC Comment:  hip surgery Reviewed Primary care provider: Chani Cuellar MD 
 
Note from PCP: Poor medical compliance. Elevated renal Fxn last week with creatinine at 2. 9. Yesterday blood work redrawn and now has a creatinine at 3.6. Sent for evaluation. Likely has prostate CA as his PSA is very high. Patient states he is asymptomatic. The history is provided by the patient. No  was used. Past Medical History:  
Diagnosis Date  CHF (congestive heart failure) ([facility] [address]  Chronic obstructive pulmonary disease ([facility] [address]  Hypertension  Prostate enlargement Past Surgical History:  
Procedure Laterality Date  HX HERNIA REPAIR    
 ventral and inguinal  
 HX ORTHOPAEDIC    
 hip surgery No family history on file. Social History Socioeconomic History  Marital status: SINGLE Spouse name: Not on file  Number of children: Not on file  Years of education: Not on file  Highest education level: Not on file Occupational History  Not on file Social Needs  Financial resource strain: Not on file  Food insecurity:  
  Worry: Not on file Inability: Not on file  Transportation needs:  
  Medical: Not on file Non-medical: Not on file Tobacco Use  
  Smoking status: Not on file Substance and Sexual Activity  Alcohol use: Not on file  Drug use: Not on file  Sexual activity: Not on file Lifestyle  Physical activity:  
  Days per week: Not on file Minutes per session: Not on file  Stress: Not on file Relationships  Social connections:  
  Talks on phone: Not on file Gets together: Not on file Attends L[employer]-day service: Not on file Active member of club or organization: Not on file Attends meetings of clubs or organizations: Not on file Relationship status: Not on file  Intimate partner violence:  
  Fear of current or ex partner: Not on file Emotionally abused: Not on file Physically abused: Not on file Forced sexual activity: Not on file Other Topics Concern  Not on file Social History Narrative  Not on file ALLERGIES: Patient has no known allergies. Review of Systems Constitutional: Negative for chills, fatigue and fever. Respiratory: Negative for shortness of breath. Cardiovascular: Negative for chest pain. Gastrointestinal: Negative for constipation, diarrhea, nausea and vomiting. Genitourinary: Negative for decreased urine volume and difficulty urinating. Musculoskeletal: Negative for arthralgias and myalgias. Skin: Negative. Neurological: Negative for dizziness and headaches. Psychiatric/Behavioral: Negative. All other systems reviewed and are negative. Vitals:  
 05/17/19 1425 05/17/19 1451 BP:  (!) 162/97 Pulse: 70 65 Resp:  15 Temp:  97.8 °F (36.6 °C) SpO2: 95% 98% Physical Exam  
Constitutional: He is oriented to person, place, and time. He appears well-developed and well-nourished. Non-toxic appearance. He does not have a sickly appearance. He does not appear ill. No distress. 61 YO obese male in NAD HENT:  
Head: Normocephalic and atraumatic. Neck: Normal range of motion. Neck supple. Cardiovascular: Normal rate, regular rhythm, normal heart sounds and intact distal pulses. Exam reveals no gallop. No murmur heard. Pulses: 
     Radial pulses are 2+ on the right side, and 2+ on the left side. BLE +4 non-pitting. Unable to feel pulses Pulmonary/Chest: Effort normal and breath sounds normal. No stridor. No respiratory distress. He has no decreased breath sounds. He has no wheezes. He has no rales. He exhibits no tenderness. Abdominal: Soft. Bowel sounds are normal. There is no tenderness. There is no guarding. Musculoskeletal: Normal range of motion. Neurological: He is alert and oriented to person, place, and time. Skin: Skin is warm and dry. No erythema. Psychiatric: He has a normal mood and affect. His behavior is normal. Judgment and thought content normal.  
Nursing note and vitals reviewed. [facility] Procedures Assessment & Plan:  
 
Orders Placed This Encounter  URINE CULTURE HOLD SAMPLE  
 CBC WITH AUTOMATED DIFF  
 METABOLIC PANEL, COMPREHENSIVE  
 URINALYSIS W/MICROSCOPIC  
[facility]  albuterol (PROVENTIL VENTOLIN) 2.5 mg /3 mL (0.083 %) nebulizer solution  VENTOLIN HFA 90 mcg/actuation inhaler  atorvastatin (LIPITOR) 40 mg tablet  bicalutamide (CASODEX) 50 mg tablet  fluticasone propionate (FLONASE) 50 mcg/actuation nasal spray  furosemide (LASIX) 20 mg tablet  furosemide (LASIX) 40 mg tablet  ipratropium (ATROVENT) 0.02 % soln  metoprolol succinate (TOPROL-XL) 100 mg tablet  metFORMIN (GLUCOPHAGE) 500 mg tablet Discussed with Aline Galan MD,ED Provider Patricio Abdullahi NP 
05/17/19 
2:57 PM 
 
Hospitalist Leif for Admission 6:22 PM 
 
ED Room Number: R37/R37 Patient Name and age:  Corey Sees 62 y.o.  male Working Diagnosis: 1. Acute renal failure, unspecified acute renal failure type ([address] 2. Hydronephrosis, unspecified hydronephrosis type 3. Prostate cancer ([address] 4. Bladder outlet obstruction Readmission: no 
Isolation Requirements:  no 
Recommended Level of Care:  med/surg Code Status:  Full Other:  Sent by PCP for imaging. Had worsening Kidney function since last week. Was advised after CT to come to the ED. Out CT listed below. Creatinine one week ago was 2.9. Now 3.78. CT Abd: 
1Extensive sherrie and osseous metastases likely reflective of metastatic 
prostate neoplasm. 2. Bilateral hydronephrosis likely reflecting urinary bladder outlet obstruction 
given suspected prostate cancer projection into the bladder base. 3. IVC filter with extensive superficial abdominal wall collaterals likely 
reflects IVC occlusion at the level of filter, but this is not well evaluated on 
unenhanced exam. 
 
 
7:06 PM 
Patient is being admitted to the hospital.  The results of their tests and reasons for their admission have been discussed with them and/or available family. They convey agreement and understanding for the need to be admitted and for their admission diagnosis. Consultation has been made with the inpatient physician specialist for hospitalization. LABORATORY TESTS: 
Recent Results (from the past 12 hour(s)) CBC WITH AUTOMATED DIFF Collection Time: 05/17/19  3:04 PM  
Result Value Ref Range WBC 5.7 4.1 - 11.1 K/uL  
 RBC 2.90 (L) 4.10 - 5.70 M/uL HGB 8.7 (L) 12.1 - 17.0 g/dL HCT 27.4 (L) 36.6 - 50.3 % MCV 94.5 80.0 - 99.0 FL  
 MCH 30.0 26.0 - 34.0 PG  
 MCHC 31.8 30.0 - 36.5 g/dL  
 RDW 16.3 (H) 11.5 - 14.5 % PLATELET 982 (L) 400 - 400 K/uL MPV 9.9 8.9 - 12.9 FL  
 NRBC 0.0 0  WBC ABSOLUTE NRBC 0.00 0.00 - 0.01 K/uL NEUTROPHILS 69 32 - 75 % LYMPHOCYTES 19 12 - 49 % MONOCYTES 9 5 - 13 % EOSINOPHILS 3 0 - 7 % BASOPHILS 0 0 - 1 % IMMATURE GRANULOCYTES 0 %  
 ABS. NEUTROPHILS 3.9 1.8 - 8.0 K/UL  
 ABS. LYMPHOCYTES 1.1 0.8 - 3.5 K/UL  
 ABS. MONOCYTES 0.5 0.0 - 1.0 K/UL  
 ABS. EOSINOPHILS 0.2 0.0 - 0.4 K/UL ABS. BASOPHILS 0.0 0.0 - 0.1 K/UL  
 ABS. IMM. GRANS. 0.0 K/UL  
 DF MANUAL    
 RBC COMMENTS ANISOCYTOSIS 
1+ METABOLIC PANEL, COMPREHENSIVE Collection Time: 05/17/19  3:04 PM  
Result Value Ref Range Sodium 141 136 - 145 mmol/L Potassium 3.7 3.5 - 5.1 mmol/L Chloride 107 97 - 108 mmol/L  
 CO2 22 21 - 32 mmol/L Anion gap 12 5 - 15 mmol/L Glucose 87 65 - 100 mg/dL BUN 31 (H) 6 - 20 MG/DL Creatinine 3.78 (H) 0.70 - 1.30 MG/DL  
 BUN/Creatinine ratio 8 (L) 12 - 20 GFR est AA 20 (L) >60 ml/min/1.73m2 GFR est non-AA 17 (L) >60 ml/min/1.73m2 Calcium 8.8 8.5 - 10.1 MG/DL Bilirubin, total 0.8 0.2 - 1.0 MG/DL  
 ALT (SGPT) 12 12 - 78 U/L  
 AST (SGOT) 28 15 - 37 U/L Alk. phosphatase 464 (H) 45 - 117 U/L Protein, total 6.9 6.4 - 8.2 g/dL Albumin 2.9 (L) 3.5 - 5.0 g/dL Globulin 4.0 2.0 - 4.0 g/dL A-G Ratio 0.7 (L) 1.1 - 2.2 SAMPLES BEING HELD Collection Time: 05/17/19  3:04 PM  
Result Value Ref Range SAMPLES BEING HELD 1red, 1blu COMMENT Add-on orders for these samples will be processed based on acceptable specimen integrity and analyte stability, which may vary by analyte. URINALYSIS W/MICROSCOPIC Collection Time: 05/17/19  5:03 PM  
Result Value Ref Range Color YELLOW/STRAW Appearance CLEAR CLEAR Specific gravity 1.008 1.003 - 1.030    
 pH (UA) 6.0 5.0 - 8.0 Protein TRACE (A) NEG mg/dL Glucose NEGATIVE  NEG mg/dL Ketone NEGATIVE  NEG mg/dL Bilirubin NEGATIVE  NEG Blood NEGATIVE  NEG Urobilinogen 0.2 0.2 - 1.0 EU/dL Nitrites NEGATIVE  NEG Leukocyte Esterase TRACE (A) NEG    
 WBC 0-4 0 - 4 /hpf  
 RBC 0-5 0 - 5 /hpf Epithelial cells FEW FEW /lpf Bacteria NEGATIVE  NEG /hpf Hyaline cast 0-2 0 - 5 /lpf URINE CULTURE HOLD SAMPLE Collection Time: 05/17/19  5:03 PM  
Result Value Ref Range Urine culture hold URINE ON HOLD IN MICROBIOLOGY DEPT FOR 3 DAYS. IF UNPRESERVED URINE IS SUBMITTED, IT CANNOT BE USED FOR ADDITIONAL TESTING AFTER 24 HRS, RECOLLECTION WILL BE REQUIRED. IMAGING RESULTS: 
No orders to display Ct Abd Pelv Wo Cont Result Date: 5/17/2019 EXAM: CT ABD PELV WO CONT INDICATION: Malignant neoplasm of prostate (HCC) COMPARISON: None. CONTRAST:  None. TECHNIQUE: Thin axial images were obtained through the abdomen and pelvis. Coronal and sagittal reconstructions were generated. Oral contrast was not administered. CT dose reduction was achieved through use of a standardized protocol tailored for this examination and automatic exposure control for dose modulation. The absence of intravenous contrast material reduces the sensitivity for evaluation of the solid parenchymal organs of the abdomen. FINDINGS: LUNG BASES: Clear. INCIDENTALLY IMAGED HEART AND MEDIASTINUM: Normal in size. Small pericardial fluid. LIVER: No mass or biliary dilatation. GALLBLADDER: Unremarkable. SPLEEN: No mass. PANCREAS: No mass or ductal dilatation. ADRENALS: Unremarkable. KIDNEYS/URETERS: Bilateral hydronephrosis and hydroureter extending to the urinary bladder. STOMACH: Unremarkable. SMALL BOWEL: No dilatation or wall thickening. COLON: No dilatation or wall thickening. APPENDIX: Unremarkable. PERITONEUM: No ascites or pneumoperitoneum.  RETROPERITONEUM: There is retroperitoneal adenopathy with 1.8 x 1.8 cm left periaortic node (3, 47), 1.5 x 2.1 cm posterior aortocaval node (3, 49), 1.4 x 2.2 cm anterior aortocaval node (3, 56), probable 2.1 x 3.1 cm aortic bifurcation node (3, 62), 2.2 x 2.2 cm right common iliac node (3, 66), 1.5 x 2.2 cm left common iliac node (3, 65), 1.5 x 3.1 cm left pelvic sidewall node (3, 70), 1.6 x 2.3 cm anterior right pelvic sidewall node (3, 74), 3.9 x 4.0 cm right pelvic sidewall lymph node (3, 75), and 2.8 x 3.7 cm left inguinal region node (3, 84). No aneurysm. IVC filter noted. REPRODUCTIVE ORGANS: Partially obscured by streak artifact from bilateral hip arthroplasty prostheses. Prostate appears prominent and may project into the bladder base. URINARY BLADDER: No mass or calculus. BONES: Diffuse sclerotic metastases. There is dislocation with superior and posterior displacement of the left hip arthroplasty prosthesis and lateral subluxation of the femoral component of the right hip arthroplasty prosthesis which is eccentrically located superiorly in the acetabular component. ADDITIONAL COMMENTS: Intensive venous collaterals in the anterior abdominal wall. IMPRESSION: 1. Extensive sherrie and osseous metastases likely reflective of metastatic prostate neoplasm. 2. Bilateral hydronephrosis likely reflecting urinary bladder outlet obstruction given suspected prostate cancer projection into the bladder base. 3. IVC filter with extensive superficial abdominal wall collaterals likely reflects IVC occlusion at the level of filter, but this is not well evaluated on unenhanced exam. 4. Dislocation of left hip arthroplasty prosthesis and capsular wear with superior and lateral subluxation of right hip arthroplasty prosthesis. 23X MEDICATIONS GIVEN: 
Medications - No data to display IMPRESSION: 
1. Acute renal failure, unspecified acute renal failure type ([address] 2. Hydronephrosis, unspecified hydronephrosis type 3. Prostate cancer ([address] 4. Bladder outlet obstruction PLAN: 
1.  Admit to hospitalist 
 
 
Fanny Melvin NP

## 2019-05-17 NOTE — ED TRIAGE NOTES
1447: Patient arrives for abnormal lab results that was completed yesterday. Patient states that his kidney functions was increased. Denies any symptoms at this time.

## 2019-05-18 ENCOUNTER — APPOINTMENT (OUTPATIENT)
Dept: GENERAL RADIOLOGY | Age: 54
DRG: 683 | End: 2019-05-18
Attending: FAMILY MEDICINE
Payer: MEDICARE

## 2019-05-18 PROBLEM — K75.81 NASH (NONALCOHOLIC STEATOHEPATITIS): Status: ACTIVE | Noted: 2019-05-18

## 2019-05-18 PROBLEM — E66.01 MORBID OBESITY (HCC): Status: ACTIVE | Noted: 2019-05-18

## 2019-05-18 LAB
ANION GAP SERPL CALC-SCNC: 8 MMOL/L (ref 5–15)
ATRIAL RATE: 64 BPM
BASOPHILS # BLD: 0 K/UL (ref 0–0.1)
BASOPHILS NFR BLD: 1 % (ref 0–1)
BUN SERPL-MCNC: 31 MG/DL (ref 6–20)
BUN/CREAT SERPL: 8 (ref 12–20)
CALCIUM SERPL-MCNC: 8.3 MG/DL (ref 8.5–10.1)
CALCULATED P AXIS, ECG09: 17 DEGREES
CALCULATED T AXIS, ECG11: 9 DEGREES
CHLORIDE SERPL-SCNC: 111 MMOL/L (ref 97–108)
CO2 SERPL-SCNC: 23 MMOL/L (ref 21–32)
COMMENT, HOLDF: NORMAL
CREAT SERPL-MCNC: 3.77 MG/DL (ref 0.7–1.3)
DIAGNOSIS, 93000: NORMAL
DIFFERENTIAL METHOD BLD: ABNORMAL
EOSINOPHIL # BLD: 0.1 K/UL (ref 0–0.4)
EOSINOPHIL NFR BLD: 3 % (ref 0–7)
ERYTHROCYTE [DISTWIDTH] IN BLOOD BY AUTOMATED COUNT: 16.2 % (ref 11.5–14.5)
EST. AVERAGE GLUCOSE BLD GHB EST-MCNC: 108 MG/DL
FOLATE SERPL-MCNC: 5.1 NG/ML (ref 5–21)
GLUCOSE BLD STRIP.AUTO-MCNC: 104 MG/DL (ref 65–100)
GLUCOSE BLD STRIP.AUTO-MCNC: 107 MG/DL (ref 65–100)
GLUCOSE BLD STRIP.AUTO-MCNC: 123 MG/DL (ref 65–100)
GLUCOSE BLD STRIP.AUTO-MCNC: 82 MG/DL (ref 65–100)
GLUCOSE SERPL-MCNC: 75 MG/DL (ref 65–100)
HAPTOGLOB SERPL-MCNC: 231 MG/DL (ref 30–200)
HBA1C MFR BLD: 5.4 % (ref 4.2–6.3)
HCT VFR BLD AUTO: 24.5 % (ref 36.6–50.3)
HGB BLD-MCNC: 7.8 G/DL (ref 12.1–17)
IMM GRANULOCYTES # BLD AUTO: 0.1 K/UL (ref 0–0.04)
IMM GRANULOCYTES NFR BLD AUTO: 2 % (ref 0–0.5)
IRON SATN MFR SERPL: 61 % (ref 20–50)
IRON SERPL-MCNC: 91 UG/DL (ref 35–150)
LDH SERPL L TO P-CCNC: 297 U/L (ref 85–241)
LYMPHOCYTES # BLD: 1.4 K/UL (ref 0.8–3.5)
LYMPHOCYTES NFR BLD: 29 % (ref 12–49)
MAGNESIUM SERPL-MCNC: 1.7 MG/DL (ref 1.6–2.4)
MCH RBC QN AUTO: 29.8 PG (ref 26–34)
MCHC RBC AUTO-ENTMCNC: 31.8 G/DL (ref 30–36.5)
MCV RBC AUTO: 93.5 FL (ref 80–99)
MONOCYTES # BLD: 0.4 K/UL (ref 0–1)
MONOCYTES NFR BLD: 9 % (ref 5–13)
NEUTS SEG # BLD: 2.8 K/UL (ref 1.8–8)
NEUTS SEG NFR BLD: 56 % (ref 32–75)
NRBC # BLD: 0 K/UL (ref 0–0.01)
NRBC BLD-RTO: 0 PER 100 WBC
P-R INTERVAL, ECG05: 186 MS
PHOSPHATE SERPL-MCNC: 3.7 MG/DL (ref 2.6–4.7)
PLATELET # BLD AUTO: 137 K/UL (ref 150–400)
PMV BLD AUTO: 10.3 FL (ref 8.9–12.9)
POTASSIUM SERPL-SCNC: 3.4 MMOL/L (ref 3.5–5.1)
Q-T INTERVAL, ECG07: 452 MS
QRS DURATION, ECG06: 74 MS
QTC CALCULATION (BEZET), ECG08: 466 MS
RBC # BLD AUTO: 2.62 M/UL (ref 4.1–5.7)
RBC MORPH BLD: ABNORMAL
RBC MORPH BLD: ABNORMAL
RETICS # AUTO: 0.04 M/UL (ref 0.03–0.1)
RETICS/RBC NFR AUTO: 1.3 % (ref 0.7–2.1)
SAMPLES BEING HELD,HOLD: NORMAL
SERVICE CMNT-IMP: ABNORMAL
SERVICE CMNT-IMP: NORMAL
SODIUM SERPL-SCNC: 142 MMOL/L (ref 136–145)
TIBC SERPL-MCNC: 149 UG/DL (ref 250–450)
VENTRICULAR RATE, ECG03: 64 BPM
VIT B12 SERPL-MCNC: 402 PG/ML (ref 193–986)
WBC # BLD AUTO: 4.8 K/UL (ref 4.1–11.1)

## 2019-05-18 PROCEDURE — 82746 ASSAY OF FOLIC ACID SERUM: CPT

## 2019-05-18 PROCEDURE — 83036 HEMOGLOBIN GLYCOSYLATED A1C: CPT

## 2019-05-18 PROCEDURE — 83615 LACTATE (LD) (LDH) ENZYME: CPT

## 2019-05-18 PROCEDURE — 74011000250 HC RX REV CODE- 250: Performed by: UROLOGY

## 2019-05-18 PROCEDURE — 83010 ASSAY OF HAPTOGLOBIN QUANT: CPT

## 2019-05-18 PROCEDURE — 74011250637 HC RX REV CODE- 250/637: Performed by: FAMILY MEDICINE

## 2019-05-18 PROCEDURE — 82962 GLUCOSE BLOOD TEST: CPT

## 2019-05-18 PROCEDURE — 82668 ASSAY OF ERYTHROPOIETIN: CPT

## 2019-05-18 PROCEDURE — 36415 COLL VENOUS BLD VENIPUNCTURE: CPT

## 2019-05-18 PROCEDURE — 80048 BASIC METABOLIC PNL TOTAL CA: CPT

## 2019-05-18 PROCEDURE — 74011000250 HC RX REV CODE- 250: Performed by: FAMILY MEDICINE

## 2019-05-18 PROCEDURE — 84100 ASSAY OF PHOSPHORUS: CPT

## 2019-05-18 PROCEDURE — 83540 ASSAY OF IRON: CPT

## 2019-05-18 PROCEDURE — 74011250636 HC RX REV CODE- 250/636: Performed by: FAMILY MEDICINE

## 2019-05-18 PROCEDURE — 77030034850

## 2019-05-18 PROCEDURE — 51798 US URINE CAPACITY MEASURE: CPT

## 2019-05-18 PROCEDURE — 77030034696 HC CATH URETH FOL 2W BARD -A

## 2019-05-18 PROCEDURE — 85025 COMPLETE CBC W/AUTO DIFF WBC: CPT

## 2019-05-18 PROCEDURE — 82607 VITAMIN B-12: CPT

## 2019-05-18 PROCEDURE — 74011000258 HC RX REV CODE- 258: Performed by: INTERNAL MEDICINE

## 2019-05-18 PROCEDURE — 94761 N-INVAS EAR/PLS OXIMETRY MLT: CPT

## 2019-05-18 PROCEDURE — 85045 AUTOMATED RETICULOCYTE COUNT: CPT

## 2019-05-18 PROCEDURE — 83735 ASSAY OF MAGNESIUM: CPT

## 2019-05-18 PROCEDURE — 65660000000 HC RM CCU STEPDOWN

## 2019-05-18 RX ORDER — LIDOCAINE HYDROCHLORIDE 20 MG/ML
JELLY TOPICAL ONCE
Status: COMPLETED | OUTPATIENT
Start: 2019-05-18 | End: 2019-05-18

## 2019-05-18 RX ORDER — DEXTROSE MONOHYDRATE AND SODIUM CHLORIDE 5; .9 G/100ML; G/100ML
75 INJECTION, SOLUTION INTRAVENOUS CONTINUOUS
Status: DISCONTINUED | OUTPATIENT
Start: 2019-05-18 | End: 2019-05-19

## 2019-05-18 RX ORDER — DEXTROSE MONOHYDRATE AND SODIUM CHLORIDE 5; .9 G/100ML; G/100ML
100 INJECTION, SOLUTION INTRAVENOUS CONTINUOUS
Status: DISCONTINUED | OUTPATIENT
Start: 2019-05-18 | End: 2019-05-18

## 2019-05-18 RX ADMIN — DEXTROSE MONOHYDRATE 12.5 G: 500 INJECTION PARENTERAL at 08:34

## 2019-05-18 RX ADMIN — BICALUTAMIDE 50 MG: 50 TABLET ORAL at 08:46

## 2019-05-18 RX ADMIN — LIDOCAINE HYDROCHLORIDE: 20 JELLY TOPICAL at 12:00

## 2019-05-18 RX ADMIN — HEPARIN SODIUM 5000 UNITS: 5000 INJECTION INTRAVENOUS; SUBCUTANEOUS at 22:05

## 2019-05-18 RX ADMIN — HEPARIN SODIUM 5000 UNITS: 5000 INJECTION INTRAVENOUS; SUBCUTANEOUS at 13:18

## 2019-05-18 RX ADMIN — METOPROLOL SUCCINATE 100 MG: 50 TABLET, EXTENDED RELEASE ORAL at 08:42

## 2019-05-18 RX ADMIN — DEXTROSE MONOHYDRATE AND SODIUM CHLORIDE 75 ML/HR: 5; .9 INJECTION, SOLUTION INTRAVENOUS at 09:38

## 2019-05-18 RX ADMIN — Medication 10 ML: at 07:17

## 2019-05-18 RX ADMIN — ATORVASTATIN CALCIUM 40 MG: 40 TABLET, FILM COATED ORAL at 22:05

## 2019-05-18 RX ADMIN — Medication 10 ML: at 15:37

## 2019-05-18 RX ADMIN — HEPARIN SODIUM 5000 UNITS: 5000 INJECTION INTRAVENOUS; SUBCUTANEOUS at 07:16

## 2019-05-18 NOTE — PROGRESS NOTES
UROLOGY Afeb Cr   3.7 WBC   4.8 Hgb  7.8 Note by Dr Amy Lopez reviewed. Nursing staff unable to place 16 fr couide' rogers. I tried 18 fr coude' and 14 fr coude'---resistance at prostate. He voided with good stream aaound rogers during rogers attempt. PVR bladder scan  171cc. Assess:  Metastatic pros ca. Will leave rogers out as he is voiding with reasonable PVR. Hydro likely related to adenopathy. On Casodex to start hormonal rx. If he is to be in hospital longer than short term we may investigate availability of Firmagon dosing while here. l

## 2019-05-18 NOTE — PROGRESS NOTES
Admission Medication Reconciliation: 
Information obtained from: Rx query Significant PMH/Disease States:  
Past Medical History:  
Diagnosis Date Atrial fibrillation ([facility] [address] CHF (congestive heart failure) ([facility] [address] Chronic obstructive pulmonary disease (HCC) Diabetes ([facility] [address] pre-diabetic on metformin Hypertension Prostate enlargement Chief Complaint for this Admission:  Hydroureteronephrosis Allergies:  Patient has no known allergies. Prior to Admission Medications:  
Prior to Admission Medications Prescriptions Last Dose Informant Patient Reported? Taking? VENTOLIN HFA 90 mcg/actuation inhaler Unknown at Unknown time  Yes No  
Sig: Take 1 Puff by inhalation every four (4) hours as needed. albuterol (PROVENTIL VENTOLIN) 2.5 mg /3 mL (0.083 %) nebulizer solution Unknown at Unknown time  Yes No  
Si.5 mg by Nebulization route every four (4) hours as needed. atorvastatin (LIPITOR) 40 mg tablet Unknown at Unknown time  Yes No  
Sig: Take 40 mg by mouth daily. bicalutamide (CASODEX) 50 mg tablet Unknown at Unknown time  Yes No  
Sig: Take 50 mg by mouth daily. fluticasone propionate (FLONASE) 50 mcg/actuation nasal spray Unknown at Unknown time  Yes No  
Si Sprays by Both Nostrils route daily. furosemide (LASIX) 20 mg tablet Unknown at Unknown time  Yes No  
Sig: Take 20 mg by mouth daily. furosemide (LASIX) 40 mg tablet Unknown at Unknown time  Yes No  
Sig: Take 40 mg by mouth daily. ipratropium (ATROVENT) 0.02 % soln Unknown at Unknown time  Yes No  
Si.5 mg every four (4) hours as needed. metoprolol succinate (TOPROL-XL) 100 mg tablet Unknown at Unknown time  Yes No  
Sig: TK 1 T PO QD Facility-Administered Medications: None Comments/Recommendations: Medication history completed based on data from outpatient records. Non-compliance per chart therefore it is not clear what he is currently taking. Fredrick Mccallum, MariamaD

## 2019-05-18 NOTE — CONSULTS
Mr. Cyndee Casanova presents for evaluation of elevated PSA. His PSA today is 615. He does report occasional dysuria when he holds his bladder too long. Urine today is clear. He is on Eliquis for a fib. He also has a history of congestive heart failure. PAST MEDICAL HISTORY:    Allergies: No known allergies. DENIES: Latex, Shellfish, X-Ray Dye, Iodine. Medications: CASODEX 50 MG ORAL TABLET (BICALUTAMIDE) one a day; Route: ORAL  * NO LIST AT TIME OF APPT   ALLERGY RELIEF CAPSULE (CETIRIZINE HCL CAPS) prn; Route: ORAL  LASIX TABLET (FUROSEMIDE TABS) as needed; Route: ORAL  METFORMIN HCL TABLET (METFORMIN HCL TABS) daily; Route: ORAL  * HEART MEDICATION daily; Route: ORAL  ELIQUIS 5 MG ORAL TABLET (APIXABAN) TK 1 T PO BID; Route: ORAL    Illnesses: High Blood Pressure and Bowel Problems. DENIES: Heart Disease, Pacemaker/Defibrillator, Lung Disease, Diabetes, Stroke/Seizure, Kidney Problems, Bleeding Problems, HIV, Hepatitis, or Cancer. Surgeries: Joint Replacement Surgery. Family History: Unknown to the patient. Social History: Unemployed - Disabled. Single. Smoking status: never smoker. Does not drink alcohol. System Review: Admits to: Unexplained Weight Loss, Dry Eyes, Dry Mouth, Leg Swelling, Shortness of Breath, Dry Skin, Difficulty Walking, Psychiatric Problems, and Easy Bleeding. DENIES: Constipation, Involuntary Urine Loss, Lower Extremity Weakness, Impaired Sex Drive, Rash. EXAMINATION: Vitals: Pulse: 92 BP: 152/97 Height: 6' 0\" Appearance: well-developed NAD Conjunctiva/Lids: conjunctivae and lids normal Pupil/Iris: pupils equal, round External Ears/Nose: normal no lesions or deformities Hearing: grossly intact Neck: supple Thyroid: no enlargement Respiratory Effort: breathing easily Abd.  Aorta: no enlargement Lower Extremity: no edema Abdomen/Flank: soft non-tender without masses; no CVA tenderness Liver/Spleen: no organomegaly Hernia: no ventral hernia Lymph: no adenopathy Gait/Station: normal Digits/Nails: no clubbing cyanosis petechiae Skin Inspection: warm and dry Skin Palpation: no SQ nodularity Sensation: no sensory deficits Judgment/Insight: intact Mood/Affect: normal       URINALYSIS from Voided specimen  Urine Dip: pH: 6.0, Bld: Neg, LE: Neg, Nit: Neg, Prot: Trace, Ket: Neg, Gluc: Neg  Urine Micro not done    PSA HISTORY  615.1 ng/ml on 05/15/2019    Prescription(s) Today: CASODEX 50 MG ORAL TABLET (BICALUTAMIDE) one a day  #30 x 6,  05/15/2019, NATI Childs MD    IMPRESSION:    1. ELEVATED PROSTATE SPECIFIC ANTIGEN PSA ([id number]) - New    PLAN: Prescription for Casodex provided today. Risks and benefits reviewed. We will proceed with a limited trust biopsy to make the diagnosis of prostate cancer. I anticipate him needing to start hormone therapy. He will need a nuclear bone scan and CT scan which we can start setting up as well. I was very juan luis with him about the likelihood of his having aggressive adenocarcinoma of the prostate. The patient was given a verbal/written log of Blood Pressure and recommendations. Moderate Hypertension: Instruct patient to have prompt (1-2 weeks) BP followup. cc: Betty Cruz MD  Transcribed by Speech [employer]  [employer]  E&M Service, Urinalysis Dipstick    Upcoming Orders  Schedule Surgery - Schedule for TRUSBXPX at Hospital under Local anesthesia. Requesting Next Available (Me).   REQUEST CARDIAC AND ELIQUIS CLEARANCE  Bone Scan, CT Cancer A/P (+IV/+PO)      Addendum    CT reviewed , IVC filter, efffusion  CT with HERNANDEZ hydro form prostate cancer high burden  Russell adenopathy in RP    recc bone scan for staging next few days    Would start firmagon or lupron as outpt unless pharmacy has Lupron    Check bladder scan , place coude rogers for HERNANDEZ - should improve hydro & JANN, CHF diurese  Will follow    ADT in my experience will also shrink nodes and resolve hydro in 3-4 weeks  Lets see if med mgt rogers may help before aggr invasive measures

## 2019-05-18 NOTE — PROGRESS NOTES
Attempted times 2 to insert a 16fr coude rogers cath. Resistance felt at each attempted insertion. Dr. Blair Smith notified and will attempt insertion.

## 2019-05-18 NOTE — CONSULTS
[address]    Hematology Oncology Consultation    Reason for consult: Prostate cancer, anemia, thrombocytopenia, Katy Gauss    Admitting Diagnosis: Prostate cancer metastatic to multiple sites ([address] [place]; Acute kidney injury superimposed on chronic kidney disease ([address] [N17.9, N18.9]; Hydroureteronephrosis [N13.30]; Anemia [D64.9]    HPI: Rudy Ceballos is a 62 y.o. male who I was asked to see in consultation at the request of Dr. Perry Patino for evaluation for prostate cancer. Pt was admitted on 5/17/2019      History: The patient is a very pleasant 58-year-old gentleman who has been doing well he went for routine checkup with his family doctor at which time his PSA was found to be elevated he was then sent to a urologist a CT and bone scans were ordered which showed widespread metastatic disease involving his abdomen and his lymph nodes as well as bones he also had bladder outlet obstruction with an elevated creatinine for that reason was admitted. It had a little difficulty with bladder drainage in the urology department is following him with that particular problem he was started on Casodex 50 mg a day and is continuing with that drug to help given anti-androgen effect. He is due to started on Lupron on the 28th of this month. The patient really denies any symptoms related to his prostate is not having any bone issues nor is he having any problems with voiding up until the present time. 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 He has had some problems with his breathing and presently uses a CPAP. He also has problems with obesity.   And is presently disabled due to an injury that happened to go  Past Medical History:   Diagnosis Date    Atrial fibrillation ([address]     CHF (congestive heart failure) (HCC)     Chronic obstructive pulmonary disease ([facility]     Diabetes ([facility]     pre-diabetic on metformin    Hypertension     Prostate enlargement       Past Surgical History:   Procedure Laterality Date    HX HERNIA REPAIR      ventral and inguinal    HX ORTHOPAEDIC      hip surgery      Prior to Admission medications    Medication Sig Start Date End Date Taking? Authorizing Provider   albuterol (PROVENTIL VENTOLIN) 2.5 mg /3 mL (0.083 %) nebulizer solution 2.5 mg by Nebulization route every four (4) hours as needed. 5/10/19   Melba Winter MD   VENTOLIN HFA 90 mcg/actuation inhaler Take 1 Puff by inhalation every four (4) hours as needed. 5/10/19   Melba Winter MD   atorvastatin (LIPITOR) 40 mg tablet Take 40 mg by mouth daily. 5/10/19   Melba Winter MD   fluticasone propionate (FLONASE) 50 mcg/actuation nasal spray 2 Sprays by Both Nostrils route daily. 5/10/19   Melba Winter MD   furosemide (LASIX) 20 mg tablet Take 20 mg by mouth daily. 5/10/19   Melba Winter MD   furosemide (LASIX) 40 mg tablet Take 40 mg by mouth daily. 5/10/19   Melba Winter MD   ipratropium (ATROVENT) 0.02 % soln 0.5 mg every four (4) hours as needed. 5/10/19   Melba Winter MD   metoprolol succinate (TOPROL-XL) 100 mg tablet TK 1 T PO QD 5/16/19   Melba Winter MD   bicalutamide (CASODEX) 50 mg tablet Take 50 mg by mouth daily. Provider, Historical     No Known Allergies   Social History     Tobacco Use    Smoking status: Never Smoker    Smokeless tobacco: Never Used   Substance Use Topics    Alcohol use: Yes     Alcohol/week: 1.8 oz     Types: 3 Cans of beer per week     Frequency: Never     Comment: on weekends only      History reviewed. No pertinent family history.      Current Medications:  Current Facility-Administered Medications   Medication Dose Route Frequency    dextrose 5% and 0.9% NaCl infusion  75 mL/hr IntraVENous CONTINUOUS    lidocaine (URO-JET/ GLYDO) 2 % jelly   Urethral ONCE    atorvastatin (LIPITOR) tablet 40 mg  40 mg Oral QHS    metoprolol succinate (TOPROL-XL) XL tablet 100 mg  100 mg Oral DAILY    sodium chloride (NS) flush 5-40 mL  5-40 mL IntraVENous Q8H    sodium chloride (NS) flush 5-40 mL  5-40 mL IntraVENous PRN    heparin (porcine) injection 5,000 Units  5,000 Units SubCUTAneous Q8H    bicalutamide (CASODEX) tablet 50 mg  50 mg Oral DAILY    glucose chewable tablet 16 g  4 Tab Oral PRN    dextrose (D50W) injection syrg 12.5-25 g  25-50 mL IntraVENous PRN    glucagon (GLUCAGEN) injection 1 mg  1 mg IntraMUSCular PRN    insulin lispro (HUMALOG) injection   SubCUTAneous AC&HS         Review of Systems: Comprehensive ROS was performed and all systems negative except for HPI. Physical Exam:  Visit Vitals  /87 (BP 1 Location: Right arm, BP Patient Position: At rest)   Pulse 66   Temp 98.3 °F (36.8 °C)   Resp 20   Ht 5' 9\" (1.753 m)   Wt 348 lb 12.3 oz (158.2 kg)   SpO2 97%   BMI 51.50 kg/m²       General:  Alert, cooperative, no distress, appears stated age. Head:  Normocephalic, without obvious abnormality, atraumatic. Eyes:  Conjunctivae/corneas clear. PERRL, EOMs intact. Throat: Lips, mucosa, and tongue normal.    Neck: Supple, symmetrical, trachea midline, no adenopathy   Back:   Symmetric, no curvature. ROM normal. No CVA tenderness. Lungs:   Clear to auscultation bilaterally. Chest wall:     Heart:  Regular rate and rhythm, S1, S2 normal   Abdomen:   Soft, non-tender. Bowel sounds normal. No masses,  No organomegaly. Extremities: Extremities normal, atraumatic, no cyanosis    Skin: Skin color, texture, turgor normal. No rashes or lesions. Lymph nodes: Cervical, supraclavicular, and axillary nodes normal.   Neurologic: CNII-XII intact. Imaging:    Chest X-Ray: Pending    CT abdomen pelvis  1. Extensive sherrie and osseous metastases likely reflective of metastatic  prostate neoplasm.   2. Bilateral hydronephrosis likely reflecting urinary bladder outlet obstruction  given suspected prostate cancer projection into the bladder base. 3. IVC filter with extensive superficial abdominal wall collaterals likely  reflects IVC occlusion at the level of filter, but this is not well evaluated on  unenhanced exam.  4. Dislocation of left hip arthroplasty prosthesis and capsular wear with  superior and lateral subluxation of right hip arthroplasty prosthesis. Labs:    Recent Results (from the past 24 hour(s))   CBC WITH AUTOMATED DIFF    Collection Time: 05/17/19  3:04 PM   Result Value Ref Range    WBC 5.7 4.1 - 11.1 K/uL    RBC 2.90 (L) 4.10 - 5.70 M/uL    HGB 8.7 (L) 12.1 - 17.0 g/dL    HCT 27.4 (L) 36.6 - 50.3 %    MCV 94.5 80.0 - 99.0 FL    MCH 30.0 26.0 - 34.0 PG    MCHC 31.8 30.0 - 36.5 g/dL    RDW 16.3 (H) 11.5 - 14.5 %    PLATELET 754 (L) 511 - 400 K/uL    MPV 9.9 8.9 - 12.9 FL    NRBC 0.0 0  WBC    ABSOLUTE NRBC 0.00 0.00 - 0.01 K/uL    NEUTROPHILS 69 32 - 75 %    LYMPHOCYTES 19 12 - 49 %    MONOCYTES 9 5 - 13 %    EOSINOPHILS 3 0 - 7 %    BASOPHILS 0 0 - 1 %    IMMATURE GRANULOCYTES 0 %    ABS. NEUTROPHILS 3.9 1.8 - 8.0 K/UL    ABS. LYMPHOCYTES 1.1 0.8 - 3.5 K/UL    ABS. MONOCYTES 0.5 0.0 - 1.0 K/UL    ABS. EOSINOPHILS 0.2 0.0 - 0.4 K/UL    ABS. BASOPHILS 0.0 0.0 - 0.1 K/UL    ABS. IMM. GRANS. 0.0 K/UL    DF MANUAL      RBC COMMENTS ANISOCYTOSIS  1+       METABOLIC PANEL, COMPREHENSIVE    Collection Time: 05/17/19  3:04 PM   Result Value Ref Range    Sodium 141 136 - 145 mmol/L    Potassium 3.7 3.5 - 5.1 mmol/L    Chloride 107 97 - 108 mmol/L    CO2 22 21 - 32 mmol/L    Anion gap 12 5 - 15 mmol/L    Glucose 87 65 - 100 mg/dL    BUN 31 (H) 6 - 20 MG/DL    Creatinine 3.78 (H) 0.70 - 1.30 MG/DL    BUN/Creatinine ratio 8 (L) 12 - 20      GFR est AA 20 (L) >60 ml/min/1.73m2    GFR est non-AA 17 (L) >60 ml/min/1.73m2    Calcium 8.8 8.5 - 10.1 MG/DL    Bilirubin, total 0.8 0.2 - 1.0 MG/DL    ALT (SGPT) 12 12 - 78 U/L    AST (SGOT) 28 15 - 37 U/L    Alk.  phosphatase 464 (H) 45 - 117 U/L    Protein, total 6.9 6.4 - 8.2 g/dL    Albumin 2.9 (L) 3.5 - 5.0 g/dL    Globulin 4.0 2.0 - 4.0 g/dL    A-G Ratio 0.7 (L) 1.1 - 2.2     SAMPLES BEING HELD    Collection Time: 05/17/19  3:04 PM   Result Value Ref Range    SAMPLES BEING HELD 1red, 1blu 1PST     COMMENT        Add-on orders for these samples will be processed based on acceptable specimen integrity and analyte stability, which may vary by analyte. URINALYSIS W/MICROSCOPIC    Collection Time: 05/17/19  5:03 PM   Result Value Ref Range    Color YELLOW/STRAW      Appearance CLEAR CLEAR      Specific gravity 1.008 1.003 - 1.030      pH (UA) 6.0 5.0 - 8.0      Protein TRACE (A) NEG mg/dL    Glucose NEGATIVE  NEG mg/dL    Ketone NEGATIVE  NEG mg/dL    Bilirubin NEGATIVE  NEG      Blood NEGATIVE  NEG      Urobilinogen 0.2 0.2 - 1.0 EU/dL    Nitrites NEGATIVE  NEG      Leukocyte Esterase TRACE (A) NEG      WBC 0-4 0 - 4 /hpf    RBC 0-5 0 - 5 /hpf    Epithelial cells FEW FEW /lpf    Bacteria NEGATIVE  NEG /hpf    Hyaline cast 0-2 0 - 5 /lpf   URINE CULTURE HOLD SAMPLE    Collection Time: 05/17/19  5:03 PM   Result Value Ref Range    Urine culture hold        URINE ON HOLD IN MICROBIOLOGY DEPT FOR 3 DAYS. IF UNPRESERVED URINE IS SUBMITTED, IT CANNOT BE USED FOR ADDITIONAL TESTING AFTER 24 HRS, RECOLLECTION WILL BE REQUIRED.    EKG, 12 LEAD, INITIAL    Collection Time: 05/17/19  9:55 PM   Result Value Ref Range    Ventricular Rate 64 BPM    Atrial Rate 64 BPM    P-R Interval 186 ms    QRS Duration 74 ms    Q-T Interval 452 ms    QTC Calculation (Bezet) 466 ms    Calculated P Axis 17 degrees    Calculated T Axis 9 degrees    Diagnosis       Normal sinus rhythm  Nonspecific ST abnormality  No previous ECGs available     GLUCOSE, POC    Collection Time: 05/17/19 11:31 PM   Result Value Ref Range    Glucose (POC) 85 65 - 100 mg/dL    Performed by [address], BASIC    Collection Time: 05/18/19  3:51 AM   Result Value Ref Range    Sodium 142 136 - 145 mmol/L    Potassium 3.4 (L) 3.5 - 5.1 mmol/L    Chloride 111 (H) 97 - 108 mmol/L    CO2 23 21 - 32 mmol/L    Anion gap 8 5 - 15 mmol/L    Glucose 75 65 - 100 mg/dL    BUN 31 (H) 6 - 20 MG/DL    Creatinine 3.77 (H) 0.70 - 1.30 MG/DL    BUN/Creatinine ratio 8 (L) 12 - 20      GFR est AA 20 (L) >60 ml/min/1.73m2    GFR est non-AA 17 (L) >60 ml/min/1.73m2    Calcium 8.3 (L) 8.5 - 10.1 MG/DL   MAGNESIUM    Collection Time: 05/18/19  3:51 AM   Result Value Ref Range    Magnesium 1.7 1.6 - 2.4 mg/dL   CBC WITH AUTOMATED DIFF    Collection Time: 05/18/19  3:51 AM   Result Value Ref Range    WBC 4.8 4.1 - 11.1 K/uL    RBC 2.62 (L) 4.10 - 5.70 M/uL    HGB 7.8 (L) 12.1 - 17.0 g/dL    HCT 24.5 (L) 36.6 - 50.3 %    MCV 93.5 80.0 - 99.0 FL    MCH 29.8 26.0 - 34.0 PG    MCHC 31.8 30.0 - 36.5 g/dL    RDW 16.2 (H) 11.5 - 14.5 %    PLATELET 475 (L) 608 - 400 K/uL    MPV 10.3 8.9 - 12.9 FL    NRBC 0.0 0  WBC    ABSOLUTE NRBC 0.00 0.00 - 0.01 K/uL    NEUTROPHILS 56 32 - 75 %    LYMPHOCYTES 29 12 - 49 %    MONOCYTES 9 5 - 13 %    EOSINOPHILS 3 0 - 7 %    BASOPHILS 1 0 - 1 %    IMMATURE GRANULOCYTES 2 (H) 0.0 - 0.5 %    ABS. NEUTROPHILS 2.8 1.8 - 8.0 K/UL    ABS. LYMPHOCYTES 1.4 0.8 - 3.5 K/UL    ABS. MONOCYTES 0.4 0.0 - 1.0 K/UL    ABS. EOSINOPHILS 0.1 0.0 - 0.4 K/UL    ABS. BASOPHILS 0.0 0.0 - 0.1 K/UL    ABS. IMM.  GRANS. 0.1 (H) 0.00 - 0.04 K/UL    DF SMEAR SCANNED      RBC COMMENTS OVALOCYTES  PRESENT        RBC COMMENTS ANISOCYTOSIS  1+       PHOSPHORUS    Collection Time: 05/18/19  3:51 AM   Result Value Ref Range    Phosphorus 3.7 2.6 - 4.7 MG/DL   HEMOGLOBIN A1C WITH EAG    Collection Time: 05/18/19  3:51 AM   Result Value Ref Range    Hemoglobin A1c 5.4 4.2 - 6.3 %    Est. average glucose 108 mg/dL   GLUCOSE, POC    Collection Time: 05/18/19  6:53 AM   Result Value Ref Range    Glucose (POC) 82 65 - 100 mg/dL    Performed by Luigi Reyes, POC    Collection Time: 05/18/19 11:15 AM   Result Value Ref Range    Glucose (POC) 107 (H) 65 - 100 mg/dL    Performed by Kennedy Hansen            Assessment and Recommendations: Naveen Colin is a  62y.o. year old seen in consultation at the request of Dr. Sly Cummings for metastatic prostate cancer, anemia and thrombocytopenia    Plan: 1. Go ahead and continue with Casodex 50 mg a day. 2.  He will be starting on Lupron on the 28th. 3.  The anemia is probably secondary to chronic renal dysfunction in addition to bone marrow replacement from his prostate cancer. We will check some blood work. 4.  Thrombocytopenia is most likely secondary to Laveen. 5.  Need to continue to work on weight reduction.       Maribeth Tripp MD  5/18/2019

## 2019-05-18 NOTE — PROGRESS NOTES
Bedside and Verbal shift change report given to Muna Guajardo RN (oncoming nurse) by Yovana Matamoros RN (offgoing nurse). Report included the following information SBAR, Kardex, ED Summary, Intake/Output, MAR and Recent Results.

## 2019-05-18 NOTE — PROGRESS NOTES
Spoke with Dr. Los Barber at this time regarding patient being NPO with no iv fluids currently running and a FSBS of 82. Received verbal order to give patient a dose of prn ordered D-50 now.

## 2019-05-18 NOTE — ROUTINE PROCESS
TRANSFER - OUT REPORT: 
 
Verbal report given to Grace Morton RN(name) on Jimi Bowen  being transferred to [facility](unit) for routine progression of care Report consisted of patients Situation, Background, Assessment and  
Recommendations(SBAR). Information from the following report(s) SBAR, Kardex, ED Summary, [facility] and Recent Results was reviewed with the receiving nurse. Lines:  
Peripheral IV 05/17/19 Left Antecubital (Active) Opportunity for questions and clarification was provided. Patient transported with: 
Transport

## 2019-05-18 NOTE — PROGRESS NOTES
Dr Usama Tristan saw patient this morning Patient with prostate CA and HERNANDEZ and [facility] it [facility] on [facility] If JANN worsens , as we couldn't get rogers placed , he will need nephrostomy

## 2019-05-18 NOTE — PROGRESS NOTES
I spoke with urologist on call Dr. Paris Garcia who recommends continuing Casodex, obtain bladder scan, consider for rogers catheter if patient is unable to void and/ or to monitor urine output (Is & Os).

## 2019-05-18 NOTE — PROGRESS NOTES
Urology input noted Post void bladder scan 171 As per urology. [place] likely related to adenopathy. We will follow cr. If it get worse. We will d/w UROLOGY for benefit of percut nephrostomy tube placement.  
 
Joan Amaya MD

## 2019-05-18 NOTE — CONSULTS
Nephrology Consult Note     [address]     www. [email or web address]              Phone - [phone]   Patient: Sal Bond   YOB: 1965    Date- 5/18/2019  MRN: [id number]             REASON FOR CONSULTATION: ACUTE KIDNEY INJURY  CONSULTING PHYSICIAN: DR. JOHNSTON    ADMIT DATE:5/17/2019 PATIENT PCP:Yajaira Dennis MD     ASSESSMENT:   · JANN LIKELY due to post renal obstruction- other possible etiology dehydration with lasix use  · ckd ? baseline  · B/l hydronephrosis  · Urinary retention  · Metastatic prostate ca  · Hypertension  · Hyperlipidemia  · Anemia           · Principal Problem:  ·   Acute kidney injury superimposed on chronic kidney disease ([facility] [address] (5/17/2019)  ·   · Active Problems:  ·   Hydroureteronephrosis (5/17/2019)  ·   ·   Anemia (5/17/2019)  ·   ·   Prostate cancer metastatic to multiple sites ([facility] [address] (5/17/2019)  ·   ·   PLAN:   · Continue ivf  · Place rogers cath  · Check bmp in am  · Hold lasix  · If cr. Remains high with hydration and ivf. We will order serologies tomorrow  · Urology input noted    [x] High complexity decision making was performed  [x] Patient is at high-risk of decompensation with multiple organ involvement    Subjective:   HPI: Sal Bond is a 62 y.o.  male. he was sent to er due to abnormal labs  He is found to have arf with cr. 3.78 and b/l hydronephrosis on CT ABDO. No old cr. Available  He has h/o hypertension. He is on lasix and metoprolol  Nurse tried to place rogers this am but she was not successful  He denies h/o DM, RENAL STONE, CAD  No recent antibiotics use  No hypotension since admission  No h/o NSAIDS use recently  No recent iv contrast exposure  Patient denies any history of hematuria, proteinuria. No history of nephrolithiasis in the past.   no history of pyelonephritis. No history of transfusion - no history of hepatitis or jaundice.     No h/o  herbal supplements use    No c/o sob,   No c/o chest pain, No c/o nausea or vomiting  No c/o  fever. No c/o dysuria    Review of Systems:     A 11 point review of system was performed today. Pertinent positives and negatives are mentioned in the HPI. The reminder of the ROS is negative and noncontributory. Past Medical History:   Diagnosis Date    Atrial fibrillation ([facility] [address]     CHF (congestive heart failure) (HCC)     Chronic obstructive pulmonary disease (HCC)     Diabetes ([facility] [address]     pre-diabetic on metformin    Hypertension     Prostate enlargement       Past Surgical History:   Procedure Laterality Date    HX HERNIA REPAIR      ventral and inguinal    HX ORTHOPAEDIC      hip surgery      Prior to Admission medications    Medication Sig Start Date End Date Taking? Authorizing Provider   albuterol (PROVENTIL VENTOLIN) 2.5 mg /3 mL (0.083 %) nebulizer solution 2.5 mg by Nebulization route every four (4) hours as needed. 5/10/19   Melba Winter MD   VENTOLIN HFA 90 mcg/actuation inhaler Take 1 Puff by inhalation every four (4) hours as needed. 5/10/19   Melba Winter MD   atorvastatin (LIPITOR) 40 mg tablet Take 40 mg by mouth daily. 5/10/19   Melba Winter MD   fluticasone propionate (FLONASE) 50 mcg/actuation nasal spray 2 Sprays by Both Nostrils route daily. 5/10/19   Melba Winter MD   furosemide (LASIX) 20 mg tablet Take 20 mg by mouth daily. 5/10/19   Melba Winter MD   furosemide (LASIX) 40 mg tablet Take 40 mg by mouth daily. 5/10/19   Melba Winter MD   ipratropium (ATROVENT) 0.02 % soln 0.5 mg every four (4) hours as needed. 5/10/19   Melba Winter MD   metoprolol succinate (TOPROL-XL) 100 mg tablet TK 1 T PO QD 5/16/19   Melba Winter MD   bicalutamide (CASODEX) 50 mg tablet Take 50 mg by mouth daily. Provider, Historical     No Known Allergies   Social History     Tobacco Use    Smoking status: Never Smoker    Smokeless tobacco: Never Used   Substance Use Topics    Alcohol use:  Yes     Alcohol/week: 1.8 oz     Types: 3 Cans of beer per week Frequency: Never     Comment: on weekends only      History reviewed. No pertinent family history. Objective:      Patient Vitals for the past 24 hrs:   Temp Pulse Resp BP SpO2   05/18/19 0807 98.6 °F (37 °C) 72 18 130/68 98 %   05/18/19 0535 98.3 °F (36.8 °C) 70 18 134/89 96 %   05/17/19 2320 98.1 °F (36.7 °C) 77 18 161/90 98 %   05/17/19 2132 97.6 °F (36.4 °C) 63 16 (!) 189/112 96 %   05/17/19 1451 97.8 °F (36.6 °C) 65 15 (!) 162/97 98 %   05/17/19 1425  70   95 %     No intake/output data recorded. Physical Exam:  General:Alert, No distress, obese  Eyes:No scleral icterus, No conjunctival pallor  Neck:Supple,no mass palpable,no thyromegaly  Lungs:Clears to auscultation Bilaterally, normal respiratory effort  CVS:RRR, S1 S2 normal,  No rub,  Abdomen:Soft, Non tender, No hepatosplenomegaly  Extremities: + LE edema  Skin:No rash or lesions, Warm and DRY   Psych: appropriate affect    :  No rogers  Musculoskeletal : no redness, no joint tenderness  NEURO: Normal Speech, Non focal f      CODE STATUS:  full  Care Plan discussed with:  Patient and nurse     Chart reviewed.      TOTAL TIME: 61 Minutes   y   Reviewed previous records   y Discussion with patient and/or family and questions answered   y >50% of visit spent in counseling and coordination of care        ECG[de-identified] Rev:yes  Xray/CT/US/MRI REV:yes  CT ABDO  FINDINGS:   LUNG BASES: Clear. INCIDENTALLY IMAGED HEART AND MEDIASTINUM: Normal in size. Small pericardial  fluid. LIVER: No mass or biliary dilatation. GALLBLADDER: Unremarkable. SPLEEN: No mass. PANCREAS: No mass or ductal dilatation. ADRENALS: Unremarkable. KIDNEYS/URETERS: Bilateral hydronephrosis and hydroureter extending to the  urinary bladder. STOMACH: Unremarkable. SMALL BOWEL: No dilatation or wall thickening. COLON: No dilatation or wall thickening. APPENDIX: Unremarkable. PERITONEUM: No ascites or pneumoperitoneum.   RETROPERITONEUM: There is retroperitoneal adenopathy with 1.8 x 1.8 cm left  periaortic node (3, 47), 1.5 x 2.1 cm posterior aortocaval node (3, 49), 1.4 x  2.2 cm anterior aortocaval node (3, 56), probable 2.1 x 3.1 cm aortic  bifurcation node (3, 62), 2.2 x 2.2 cm right common iliac node (3, 66), 1.5 x  2.2 cm left common iliac node (3, 65), 1.5 x 3.1 cm left pelvic sidewall node  (3, 70), 1.6 x 2.3 cm anterior right pelvic sidewall node (3, 74), 3.9 x 4.0 cm  right pelvic sidewall lymph node (3, 75), and 2.8 x 3.7 cm left inguinal region  node (3, 84). No aneurysm. IVC filter noted. REPRODUCTIVE ORGANS: Partially obscured by streak artifact from bilateral hip  arthroplasty prostheses. Prostate appears prominent and may project into the  bladder base. URINARY BLADDER: No mass or calculus. BONES: Diffuse sclerotic metastases. There is dislocation with superior and  posterior displacement of the left hip arthroplasty prosthesis and lateral  subluxation of the femoral component of the right hip arthroplasty prosthesis  which is eccentrically located superiorly in the acetabular component. ADDITIONAL COMMENTS: Intensive venous collaterals in the anterior abdominal  wall.     IMPRESSION  IMPRESSION:     1. Extensive sherrie and osseous metastases likely reflective of metastatic  prostate neoplasm. 2. Bilateral hydronephrosis likely reflecting urinary bladder outlet obstruction  given suspected prostate cancer projection into the bladder base. 3. IVC filter with extensive superficial abdominal wall collaterals likely  reflects IVC occlusion at the level of filter, but this is not well evaluated on  unenhanced exam.  4. Dislocation of left hip arthroplasty prosthesis and capsular wear with  superior and lateral subluxation of right hip arthroplasty prosthesis.       23X  Lab Data Personally Reviewed: (see below)  Recent Labs     05/18/19  0351 05/17/19  1504   WBC 4.8 5.7   HGB 7.8* 8.7*   * 144*   ANEU 2.8 3.9    141   K 3.4* 3.7   GLU 75 87   BUN 31* 31*   CREA 3.77* 3.78*   ALT  --  12   SGOT  --  28   TBILI  --  0.8   AP  --  464*   CA 8.3* 8.8   MG 1.7  --    PHOS 3.7  --      Lab Results   Component Value Date/Time    Color YELLOW/STRAW 2019 05:03 PM    Appearance CLEAR 2019 05:03 PM    Specific gravity 1.008 2019 05:03 PM    pH (UA) 6.0 2019 05:03 PM    Protein TRACE (A) 2019 05:03 PM    Glucose NEGATIVE  2019 05:03 PM    Ketone NEGATIVE  2019 05:03 PM    Bilirubin NEGATIVE  2019 05:03 PM    Urobilinogen 0.2 2019 05:03 PM    Nitrites NEGATIVE  2019 05:03 PM    Leukocyte Esterase TRACE (A) 2019 05:03 PM    Epithelial cells FEW 2019 05:03 PM    Bacteria NEGATIVE  2019 05:03 PM    WBC 0-4 2019 05:03 PM    RBC 0-5 2019 05:03 PM       No results found for: IRON, FE, TIBC, IBCT, PSAT, FERR  No results found for: CULT  Prior to Admission Medications   Prescriptions Last Dose Informant Patient Reported? Taking? VENTOLIN HFA 90 mcg/actuation inhaler Unknown at Unknown time  Yes No   Sig: Take 1 Puff by inhalation every four (4) hours as needed. albuterol (PROVENTIL VENTOLIN) 2.5 mg /3 mL (0.083 %) nebulizer solution Unknown at Unknown time  Yes No   Si.5 mg by Nebulization route every four (4) hours as needed. atorvastatin (LIPITOR) 40 mg tablet Unknown at Unknown time  Yes No   Sig: Take 40 mg by mouth daily. bicalutamide (CASODEX) 50 mg tablet Unknown at Unknown time  Yes No   Sig: Take 50 mg by mouth daily. fluticasone propionate (FLONASE) 50 mcg/actuation nasal spray Unknown at Unknown time  Yes No   Si Sprays by Both Nostrils route daily. furosemide (LASIX) 20 mg tablet Unknown at Unknown time  Yes No   Sig: Take 20 mg by mouth daily. furosemide (LASIX) 40 mg tablet Unknown at Unknown time  Yes No   Sig: Take 40 mg by mouth daily. ipratropium (ATROVENT) 0.02 % soln Unknown at Unknown time  Yes No   Si.5 mg every four (4) hours as needed.    metoprolol succinate (TOPROL-XL) 100 mg tablet Unknown at Unknown time  Yes No   Sig: TK 1 T PO QD      Facility-Administered Medications: None     Imaging:    Medications list Personally Reviewed   [x]      Yes     []               No    Thank you for allowing us to participate in the care this patient. We will follow patient with you. Signed By: Judit Bearden MD  [facility]  [facility]  [facility] [address] [place] [facility]  [place], [address] [place]  Phone - [phone]         Fax - [phone] [facility] Office  [address] [place] [address]  Phone - [phone]        Fax - [phone]     www. [email or web address]

## 2019-05-19 ENCOUNTER — APPOINTMENT (OUTPATIENT)
Dept: GENERAL RADIOLOGY | Age: 54
DRG: 683 | End: 2019-05-19
Attending: FAMILY MEDICINE
Payer: MEDICARE

## 2019-05-19 LAB
ALBUMIN SERPL-MCNC: 2.4 G/DL (ref 3.5–5)
ALBUMIN/GLOB SERPL: 0.6 {RATIO} (ref 1.1–2.2)
ALP SERPL-CCNC: 411 U/L (ref 45–117)
ALT SERPL-CCNC: 10 U/L (ref 12–78)
ANION GAP SERPL CALC-SCNC: 10 MMOL/L (ref 5–15)
AST SERPL-CCNC: 23 U/L (ref 15–37)
BASOPHILS # BLD: 0 K/UL (ref 0–0.1)
BASOPHILS NFR BLD: 1 % (ref 0–1)
BILIRUB SERPL-MCNC: 0.5 MG/DL (ref 0.2–1)
BUN SERPL-MCNC: 30 MG/DL (ref 6–20)
BUN/CREAT SERPL: 9 (ref 12–20)
CALCIUM SERPL-MCNC: 8.2 MG/DL (ref 8.5–10.1)
CHLORIDE SERPL-SCNC: 111 MMOL/L (ref 97–108)
CO2 SERPL-SCNC: 21 MMOL/L (ref 21–32)
CREAT SERPL-MCNC: 3.45 MG/DL (ref 0.7–1.3)
DIFFERENTIAL METHOD BLD: ABNORMAL
EOSINOPHIL # BLD: 0.2 K/UL (ref 0–0.4)
EOSINOPHIL NFR BLD: 4 % (ref 0–7)
ERYTHROCYTE [DISTWIDTH] IN BLOOD BY AUTOMATED COUNT: 16.3 % (ref 11.5–14.5)
GLOBULIN SER CALC-MCNC: 3.9 G/DL (ref 2–4)
GLUCOSE BLD STRIP.AUTO-MCNC: 110 MG/DL (ref 65–100)
GLUCOSE BLD STRIP.AUTO-MCNC: 115 MG/DL (ref 65–100)
GLUCOSE BLD STRIP.AUTO-MCNC: 117 MG/DL (ref 65–100)
GLUCOSE BLD STRIP.AUTO-MCNC: 118 MG/DL (ref 65–100)
GLUCOSE SERPL-MCNC: 98 MG/DL (ref 65–100)
HCT VFR BLD AUTO: 24.1 % (ref 36.6–50.3)
HGB BLD-MCNC: 7.8 G/DL (ref 12.1–17)
IMM GRANULOCYTES # BLD AUTO: 0.1 K/UL (ref 0–0.04)
IMM GRANULOCYTES NFR BLD AUTO: 2 % (ref 0–0.5)
LYMPHOCYTES # BLD: 1.4 K/UL (ref 0.8–3.5)
LYMPHOCYTES NFR BLD: 31 % (ref 12–49)
MCH RBC QN AUTO: 30.2 PG (ref 26–34)
MCHC RBC AUTO-ENTMCNC: 32.4 G/DL (ref 30–36.5)
MCV RBC AUTO: 93.4 FL (ref 80–99)
MONOCYTES # BLD: 0.4 K/UL (ref 0–1)
MONOCYTES NFR BLD: 10 % (ref 5–13)
NEUTS SEG # BLD: 2.3 K/UL (ref 1.8–8)
NEUTS SEG NFR BLD: 52 % (ref 32–75)
NRBC # BLD: 0 K/UL (ref 0–0.01)
NRBC BLD-RTO: 0 PER 100 WBC
PLATELET # BLD AUTO: 138 K/UL (ref 150–400)
PMV BLD AUTO: 10.4 FL (ref 8.9–12.9)
POTASSIUM SERPL-SCNC: 3.5 MMOL/L (ref 3.5–5.1)
PROT SERPL-MCNC: 6.3 G/DL (ref 6.4–8.2)
RBC # BLD AUTO: 2.58 M/UL (ref 4.1–5.7)
SERVICE CMNT-IMP: ABNORMAL
SODIUM SERPL-SCNC: 142 MMOL/L (ref 136–145)
WBC # BLD AUTO: 4.4 K/UL (ref 4.1–11.1)

## 2019-05-19 PROCEDURE — 74011250637 HC RX REV CODE- 250/637: Performed by: INTERNAL MEDICINE

## 2019-05-19 PROCEDURE — 74011000258 HC RX REV CODE- 258: Performed by: INTERNAL MEDICINE

## 2019-05-19 PROCEDURE — 80053 COMPREHEN METABOLIC PANEL: CPT

## 2019-05-19 PROCEDURE — 36415 COLL VENOUS BLD VENIPUNCTURE: CPT

## 2019-05-19 PROCEDURE — 74011250636 HC RX REV CODE- 250/636: Performed by: FAMILY MEDICINE

## 2019-05-19 PROCEDURE — 82962 GLUCOSE BLOOD TEST: CPT

## 2019-05-19 PROCEDURE — 65660000000 HC RM CCU STEPDOWN

## 2019-05-19 PROCEDURE — 71045 X-RAY EXAM CHEST 1 VIEW: CPT

## 2019-05-19 PROCEDURE — 74011250637 HC RX REV CODE- 250/637: Performed by: FAMILY MEDICINE

## 2019-05-19 PROCEDURE — 85025 COMPLETE CBC W/AUTO DIFF WBC: CPT

## 2019-05-19 RX ORDER — POTASSIUM CHLORIDE 1.5 G/1.77G
20 POWDER, FOR SOLUTION ORAL
Status: COMPLETED | OUTPATIENT
Start: 2019-05-19 | End: 2019-05-19

## 2019-05-19 RX ORDER — SODIUM CHLORIDE 450 MG/100ML
75 INJECTION, SOLUTION INTRAVENOUS CONTINUOUS
Status: DISCONTINUED | OUTPATIENT
Start: 2019-05-19 | End: 2019-05-20

## 2019-05-19 RX ADMIN — POTASSIUM CHLORIDE 20 MEQ: 1.5 POWDER, FOR SOLUTION ORAL at 09:11

## 2019-05-19 RX ADMIN — Medication 10 ML: at 15:29

## 2019-05-19 RX ADMIN — METOPROLOL SUCCINATE 100 MG: 50 TABLET, EXTENDED RELEASE ORAL at 09:12

## 2019-05-19 RX ADMIN — BICALUTAMIDE 50 MG: 50 TABLET ORAL at 12:01

## 2019-05-19 RX ADMIN — Medication 10 ML: at 06:43

## 2019-05-19 RX ADMIN — HEPARIN SODIUM 5000 UNITS: 5000 INJECTION INTRAVENOUS; SUBCUTANEOUS at 06:43

## 2019-05-19 RX ADMIN — DEXTROSE MONOHYDRATE AND SODIUM CHLORIDE 75 ML/HR: 5; .9 INJECTION, SOLUTION INTRAVENOUS at 06:46

## 2019-05-19 RX ADMIN — SODIUM CHLORIDE 75 ML/HR: 450 INJECTION, SOLUTION INTRAVENOUS at 09:08

## 2019-05-19 RX ADMIN — HEPARIN SODIUM 5000 UNITS: 5000 INJECTION INTRAVENOUS; SUBCUTANEOUS at 15:00

## 2019-05-19 NOTE — PROGRESS NOTES
Bedside shift change report given to Lou (oncoming nurse) by Jenene Epley (offgoing nurse). Report included the following information SBAR.

## 2019-05-19 NOTE — PROGRESS NOTES
Nephrology Progress Note [address]  
 
www. [email or web address]                  Phone - [phone] Patient: Pennie Lopze Date- 5/19/2019 Admit Date: 5/17/2019 YOB: 1965 CC: Follow up for arf Subjective: Interval History:  
-  Cr. Improved 
k low Hyperchloremic acidosis + Voiding well. No rogers placed No C/O of chest pain,SOB, vomiting, abdominal pain,fever,chills ROS:- as above Assessment:  
· karmen LIKELY due to post renal obstruction- other possible etiology dehydration with lasix use · ckd ? baseline · B/l hydronephrosis due to adenopathy - voiding well. PVR normal 
· Urinary retention · Metastatic prostate ca · Hypertension · Hyperlipidemia · Anemia · hypokalemia Plan:  
· Change ivf to .45 nacl · Bmp in am 
· kcl 20 meq · Hold lasix · Anemia management per hem onc. · Urology input noted. Physical Exam:  
GEN: NAD NECK- Supple, no mass RESP: Clear b/l, no wheezing, No accessory muscle use CVS: RRR,S1,S2 ABDO: soft , non tender, No mass EXT: No Edema NEURO: normal speech, non focal 
 
Care Plan discussed with: pt 
Objective:  
Visit Vitals /74 Pulse 62 Temp 98.4 °F (36.9 °C) Resp 18 Ht 5' 9\" (1.753 m) Wt (!) 158.9 kg (350 lb 5 oz) SpO2 98% BMI 51.73 kg/m² Last 3 Recorded Weights in this Encounter 05/17/19 2132 05/18/19 6863 05/19/19 5582 Weight: (!) 176.3 kg (388 lb 10.7 oz) 158.2 kg (348 lb 12.3 oz) (!) 158.9 kg (350 lb 5 oz) 05/17 1901 - 05/19 0700 In: 2621.8 [P.O.:780; I.V.:1841.8] Out: 0268 [[id number]] Chart reviewed. Pertinent Notes reviewed. Medication list  reviewed Current Facility-Administered Medications Medication  0.45% sodium chloride infusion  potassium chloride (KLOR-CON) packet for solution 20 mEq  atorvastatin (LIPITOR) tablet 40 mg  
 metoprolol succinate (TOPROL-XL) XL tablet 100 mg  
 sodium chloride (NS) flush 5-40 mL  sodium chloride (NS) flush 5-40 mL  heparin (porcine) injection 5,000 Units  bicalutamide (CASODEX) tablet 50 mg  
 glucose chewable tablet 16 g  
 dextrose (D50W) injection syrg 12.5-25 g  
 glucagon (GLUCAGEN) injection 1 mg  
 insulin lispro (HUMALOG) injection Data Review : 
Recent Labs 05/19/19 
0500 05/18/19 
0351 05/17/19 
1504 WBC 4.4 4.8 5.7 HGB 7.8* 7.8* 8.7* * 137* 144* ANEU 2.3 2.8 3.9  142 141  
K 3.5 3.4* 3.7 GLU 98 75 87 BUN 30* 31* 31* CREA 3.45* 3.77* 3.78* ALT 10*  --  12 SGOT 23  --  28  
TBILI 0.5  --  0.8 *  --  464* CA 8.2* 8.3* 8.8 MG  --  1.7  --   
PHOS  --  3.7  -- No results found for: CULT No results found for: SDES Recent Labs 05/18/19 
1350 TIBC 149* PSAT 61* No results found for: Sharda Lackey MD 
[address] [facility] 
 www. [email or web address] [address] [place], [address] Phone - [phone] Fax - [phone]

## 2019-05-19 NOTE — PROGRESS NOTES
Telemetry tech called and stated patient had 10 beats of V tach. Pt asymptomatic. No complaints of chest pain, denies palpitations. Pt had just been up to bedside commode and back to bed after assessing pt. BP slightly elevated at 158/96. Pt states \"I have CHF\". MD paged and notified. No new orders received at this time. MD also states pt has CHF, and will possibly order PO potassium, and to continue to monitor. No acute distress noted at this time. Pt resting comfortably in bed. Will continue to monitor pt.

## 2019-05-19 NOTE — PROGRESS NOTES
Problem: Pressure Injury - Risk of 
Goal: *Prevention of pressure injury Description Document Binh Scale and appropriate interventions in the flowsheet. Outcome: Progressing Towards Goal 
  
Problem: Patient Education: Go to Patient Education Activity Goal: Patient/Family Education Outcome: Progressing Towards Goal 
  
Problem: Falls - Risk of 
Goal: *Absence of Falls Description Document Wyatt Clark Fall Risk and appropriate interventions in the flowsheet. Outcome: Progressing Towards Goal 
  
Problem: Patient Education: Go to Patient Education Activity Goal: Patient/Family Education Outcome: Progressing Towards Goal

## 2019-05-19 NOTE — PROGRESS NOTES
UROLOGY Afeb WBC   4.4 Cr  Down slightly to 3.4 Voiding well No pain 
 
Continue casodex. Await possible degarelix availability here. Hoping to avoid perc neph.

## 2019-05-19 NOTE — PROGRESS NOTES
[address] [facility]  Hematology Oncology Consultation Reason for consult: Prostate cancer, anemia, thrombocytopenia, [place] Admitting Diagnosis: Prostate cancer metastatic to multiple sites ([address] Regina Dominguez; Acute kidney injury superimposed on chronic kidney disease ([address] [N17.9, N18.9]; Hydroureteronephrosis [N13.30]; Anemia [D64.9] HPI: Gabi Velazquez is a 62 y.o. male who I was asked to see in consultation at the request of Dr. Blas Camargo for evaluation for prostate cancer. Pt was admitted on 5/17/2019 History: The patient is a very pleasant 66-year-old gentleman who has been doing well he went for routine checkup with his family doctor at which time his PSA was found to be elevated he was then sent to a urologist a CT and bone scans were ordered which showed widespread metastatic disease involving his abdomen and his lymph nodes as well as bones he also had bladder outlet obstruction with an elevated creatinine for that reason was admitted. He had a little difficulty with bladder drainage so the urology department is following him with that particular problem. He was started on Casodex 50 mg a day and is continuing with that drug to help given anti-androgen effect. He is due to started on Lupron on the 28th of this month. The patient really denies any symptoms related to his prostate is not having any bone issues nor is he having any problems with voiding up until the present time. 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 He has had some problems with his breathing and presently uses a CPAP. He also has problems with obesity. And is presently disabled due to an injury that happened long ago Past Medical History:  
Diagnosis Date  Atrial fibrillation ([address]  CHF (congestive heart failure) ([address]  Chronic obstructive pulmonary disease ([facility]  Diabetes ([facility] [address] pre-diabetic on metformin  Hypertension  Prostate enlargement Past Surgical History:  
Procedure Laterality Date  HX HERNIA REPAIR    
 ventral and inguinal  
 HX ORTHOPAEDIC    
 hip surgery Prior to Admission medications Medication Sig Start Date End Date Taking? Authorizing Provider  
albuterol (PROVENTIL VENTOLIN) 2.5 mg /3 mL (0.083 %) nebulizer solution 2.5 mg by Nebulization route every four (4) hours as needed. 5/10/19   Melba Winter MD  
VENTOLIN HFA 90 mcg/actuation inhaler Take 1 Puff by inhalation every four (4) hours as needed. 5/10/19   Melba Winter MD  
atorvastatin (LIPITOR) 40 mg tablet Take 40 mg by mouth daily. 5/10/19   Melba Winter MD  
fluticasone propionate (FLONASE) 50 mcg/actuation nasal spray 2 Sprays by Both Nostrils route daily. 5/10/19   Melba Winter MD  
furosemide (LASIX) 20 mg tablet Take 20 mg by mouth daily. 5/10/19   Melba Winter MD  
furosemide (LASIX) 40 mg tablet Take 40 mg by mouth daily. 5/10/19   Melba Winter MD  
ipratropium (ATROVENT) 0.02 % soln 0.5 mg every four (4) hours as needed. 5/10/19   Melba Winter MD  
metoprolol succinate (TOPROL-XL) 100 mg tablet TK 1 T PO QD 5/16/19   Melba Winter MD  
bicalutamide (CASODEX) 50 mg tablet Take 50 mg by mouth daily. Provider, Historical  
 
No Known Allergies Social History Tobacco Use  Smoking status: Never Smoker  Smokeless tobacco: Never Used Substance Use Topics  Alcohol use: Yes Alcohol/week: 1.8 oz Types: 3 Cans of beer per week Frequency: Never Comment: on weekends only History reviewed. No pertinent family history. Current Medications: 
Current Facility-Administered Medications Medication Dose Route Frequency  0.45% sodium chloride infusion  75 mL/hr IntraVENous CONTINUOUS  
 atorvastatin (LIPITOR) tablet 40 mg  40 mg Oral QHS  metoprolol succinate (TOPROL-XL) XL tablet 100 mg  100 mg Oral DAILY  sodium chloride (NS) flush 5-40 mL  5-40 mL IntraVENous Q8H  
 sodium chloride (NS) flush 5-40 mL  5-40 mL IntraVENous PRN  
 heparin (porcine) injection 5,000 Units  5,000 Units SubCUTAneous Q8H  
 bicalutamide (CASODEX) tablet 50 mg  50 mg Oral DAILY  glucose chewable tablet 16 g  4 Tab Oral PRN  
 dextrose (D50W) injection syrg 12.5-25 g  25-50 mL IntraVENous PRN  
 glucagon (GLUCAGEN) injection 1 mg  1 mg IntraMUSCular PRN  
 insulin lispro (HUMALOG) injection   SubCUTAneous AC&HS Review of Systems: Comprehensive ROS was performed and all systems negative except for HPI. Physical Exam: 
Visit Vitals BP (!) 158/96 Pulse 73 Temp 98.2 °F (36.8 °C) Resp 19 Ht 5' 9\" (1.753 m) Wt (!) 350 lb 5 oz (158.9 kg) SpO2 99% BMI 51.73 kg/m² General:  Alert, cooperative, no distress, appears stated age. Head:  Normocephalic, without obvious abnormality, atraumatic. Eyes:  Conjunctivae/corneas clear. PERRL, EOMs intact. Throat: Lips, mucosa, and tongue normal.   
Neck: Supple, symmetrical, trachea midline, no adenopathy Back:   Symmetric, no curvature. ROM normal. No CVA tenderness. Lungs:   Clear to auscultation bilaterally. Chest wall:    
Heart:  Regular rate and rhythm, S1, S2 normal  
Abdomen:   Soft, non-tender. Bowel sounds normal. No masses,  No organomegaly. Extremities: Extremities normal, atraumatic, no cyanosis Skin: Skin color, texture, turgor normal. No rashes or lesions. Lymph nodes: Cervical, supraclavicular, and axillary nodes normal.  
Neurologic: CNII-XII intact. Imaging: 
 
Chest X-Ray: Pending CT abdomen pelvis 1. Extensive sherrie and osseous metastases likely reflective of metastatic 
prostate neoplasm. 2. Bilateral hydronephrosis likely reflecting urinary bladder outlet obstruction 
given suspected prostate cancer projection into the bladder base. 3. IVC filter with extensive superficial abdominal wall collaterals likely 
reflects IVC occlusion at the level of filter, but this is not well evaluated on 
unenhanced exam. 
4. Dislocation of left hip arthroplasty prosthesis and capsular wear with 
superior and lateral subluxation of right hip arthroplasty prosthesis. Labs: 
 
Recent Results (from the past 24 hour(s)) GLUCOSE, POC Collection Time: 05/18/19  4:51 PM  
Result Value Ref Range Glucose (POC) 104 (H) 65 - 100 mg/dL Performed by Bia Wolf GLUCOSE, POC Collection Time: 05/18/19  9:32 PM  
Result Value Ref Range Glucose (POC) 123 (H) 65 - 100 mg/dL Performed by Arn Dustman METABOLIC PANEL, COMPREHENSIVE Collection Time: 05/19/19  5:00 AM  
Result Value Ref Range Sodium 142 136 - 145 mmol/L Potassium 3.5 3.5 - 5.1 mmol/L Chloride 111 (H) 97 - 108 mmol/L  
 CO2 21 21 - 32 mmol/L Anion gap 10 5 - 15 mmol/L Glucose 98 65 - 100 mg/dL BUN 30 (H) 6 - 20 MG/DL Creatinine 3.45 (H) 0.70 - 1.30 MG/DL  
 BUN/Creatinine ratio 9 (L) 12 - 20 GFR est AA 22 (L) >60 ml/min/1.73m2 GFR est non-AA 19 (L) >60 ml/min/1.73m2 Calcium 8.2 (L) 8.5 - 10.1 MG/DL Bilirubin, total 0.5 0.2 - 1.0 MG/DL  
 ALT (SGPT) 10 (L) 12 - 78 U/L  
 AST (SGOT) 23 15 - 37 U/L Alk. phosphatase 411 (H) 45 - 117 U/L Protein, total 6.3 (L) 6.4 - 8.2 g/dL Albumin 2.4 (L) 3.5 - 5.0 g/dL Globulin 3.9 2.0 - 4.0 g/dL A-G Ratio 0.6 (L) 1.1 - 2.2    
CBC WITH AUTOMATED DIFF Collection Time: 05/19/19  5:00 AM  
Result Value Ref Range WBC 4.4 4.1 - 11.1 K/uL  
 RBC 2.58 (L) 4.10 - 5.70 M/uL HGB 7.8 (L) 12.1 - 17.0 g/dL HCT 24.1 (L) 36.6 - 50.3 % MCV 93.4 80.0 - 99.0 FL  
 MCH 30.2 26.0 - 34.0 PG  
 MCHC 32.4 30.0 - 36.5 g/dL  
 RDW 16.3 (H) 11.5 - 14.5 % PLATELET 960 (L) 857 - 400 K/uL MPV 10.4 8.9 - 12.9 FL  
 NRBC 0.0 0  WBC ABSOLUTE NRBC 0.00 0.00 - 0.01 K/uL NEUTROPHILS 52 32 - 75 % LYMPHOCYTES 31 12 - 49 % MONOCYTES 10 5 - 13 % EOSINOPHILS 4 0 - 7 % BASOPHILS 1 0 - 1 % IMMATURE GRANULOCYTES 2 (H) 0.0 - 0.5 % ABS. NEUTROPHILS 2.3 1.8 - 8.0 K/UL  
 ABS. LYMPHOCYTES 1.4 0.8 - 3.5 K/UL  
 ABS. MONOCYTES 0.4 0.0 - 1.0 K/UL  
 ABS. EOSINOPHILS 0.2 0.0 - 0.4 K/UL  
 ABS. BASOPHILS 0.0 0.0 - 0.1 K/UL  
 ABS. IMM. GRANS. 0.1 (H) 0.00 - 0.04 K/UL  
 DF AUTOMATED    
GLUCOSE, POC Collection Time: 05/19/19  6:41 AM  
Result Value Ref Range Glucose (POC) 110 (H) 65 - 100 mg/dL Performed by Rony Chan GLUCOSE, POC Collection Time: 05/19/19 11:30 AM  
Result Value Ref Range Glucose (POC) 118 (H) 65 - 100 mg/dL Performed by Alisa Tiwari Assessment and Recommendations: Carl Pena is a  62y.o. year old seen in consultation at the request of Dr. Elif Rivera for metastatic prostate cancer, anemia and thrombocytopenia Plan: 1. Go ahead and continue with Casodex 50 mg a day. 2.  He will be starting on Lupron on the 28th. 3.  The anemia is probably secondary to chronic renal dysfunction in addition to bone marrow replacement from his prostate cancer. We will check some blood work. The patient will probably need to start on erythropoietin shots 4. Thrombocytopenia is most likely secondary to [place]. 5.  Need to continue to work on weight reduction. 6.  We will also need to get him on Xgeva to help with his bone metastasis. 7.  We will plan on seeing him in the office after discharge to take care of the hemoglobin problem and we will get him on Xgeva if the urologist have not Julio Oneill MD 
5/19/2019

## 2019-05-19 NOTE — PROGRESS NOTES
Hospitalist Progress Note Nathalie Bueno MD 
Answering service: [phone] OR [phone] from in house phone Cell:   
  
Date of Service:  2019 NAME:  Miguel Portillo :  1965 MRN:  [id number] Admission Summary:  
erral from CT for IV hydration. PCP called patient while he was still in radiology and advised him that his kidney function was elevated and needed to be seen. Interval history / Subjective:  
   
Patient examined bedside No new problems Says he has only shoulder pain Assessment & Plan:  
 
 
JANN Nephrology has been consculted and he has obstruction as cause of her Sherrine Sparrow and has been starte d on NS , changed to half NS Bmp in am  
 
Prostate cancer CT shows Extensive sherrie and osseous metastases likely reflective of metastatic 
prostate neoplasm. Continue casodex HERNANDEZ with hydronephrosis Carissa Rubén Bilateral hydronephrosis likely reflecting urinary bladder outlet obstruction 
given suspected prostate cancer projection into the bladder base Urology couldot place rogers in him May need nephrostomy ? Anemia  
7 .8 Stool studies , iron studies Occluded IVC filter with collaterals Dislocation of left hip arthoplasty Consult  Ortho History of heart failure Type unknown ? He is poor historian Code status:  Full DVT prophylaxis: none Care Plan discussed with: Patient/Family Disposition: TBD Hospital Problems  Never Reviewed Codes Class Noted POA ELI (nonalcoholic steatohepatitis) ICD-10-CM: S05.54 ICD-9-CM: 571.8  2019 Unknown Morbid obesity ([facility] [address] ICD-10-CM: E66.01 
ICD-9-CM: 278.01  2019 Unknown Hydroureteronephrosis ICD-10-CM: N13.30 ICD-9-CM: 671  2019 Unknown Anemia ICD-10-CM: D64.9 ICD-9-CM: 285.9  2019 Unknown  Prostate cancer metastatic to multiple sites [facility]) ICD-10-CM: B79 
 ICD-9-CM: 185, 199.0  5/17/2019 Unknown * (Principal) Acute kidney injury superimposed on chronic kidney disease ([facility] ICD-10-CM: N17.9, N18.9 ICD-9-CM: 866.00, 585.9  5/17/2019 Unknown Review of Systems: A comprehensive review of systems was negative except for that written in the HPI. Vital Signs:  
 Last 24hrs VS reviewed since prior progress note. Most recent are: 
Visit Vitals /70 Pulse 66 Temp 98.4 °F (36.9 °C) Resp 20 Ht 5' 9\" (1.753 m) Wt (!) 158.9 kg (350 lb 5 oz) SpO2 98% BMI 51.73 kg/m² Intake/Output Summary (Last 24 hours) at 5/19/2019 1653 Last data filed at 5/19/2019 1541 Gross per 24 hour Intake 2081.75 ml Output 3575 ml Net -1493.25 ml Physical Examination:  
 
 
     
Constitutional:  No acute distress, obese ENT:  Oral mucous moist, oropharynx benign. Neck supple, Resp:  CTA bilaterally. No wheezing/rhonchi/rales. No accessory muscle use CV:  Regular rhythm, normal rate, no murmurs, gallops, rubs GI:  Soft, non distended, non tender. normoactive bowel sounds, no hepatosplenomegaly ,,obese Musculoskeletal:  No edema, warm, 2+ pulses throughout Neurologic:  Moves all extremities. AAOx3, CN II-XII reviewed Data Review:  
 Review and/or order of clinical lab test 
 
 
Labs:  
 
Recent Labs 05/19/19 
0500 05/18/19 
0351 WBC 4.4 4.8 HGB 7.8* 7.8* HCT 24.1* 24.5*  
* 137* Recent Labs 05/19/19 
0500 05/18/19 
0351 05/17/19 
1504  142 141  
K 3.5 3.4* 3.7 * 111* 107 CO2 21 23 22 BUN 30* 31* 31* CREA 3.45* 3.77* 3.78* GLU 98 75 87  
CA 8.2* 8.3* 8.8 MG  --  1.7  --   
PHOS  --  3.7  --   
 
Recent Labs 05/19/19 
0500 05/17/19 
1504 SGOT 23 28 ALT 10* 12 * 464* TBILI 0.5 0.8 TP 6.3* 6.9 ALB 2.4* 2.9*  
GLOB 3.9 4.0 No results for input(s): INR, PTP, APTT in the last 72 hours. No lab exists for component: INREXT Recent Labs 05/18/19 
1350 TIBC 149* PSAT 61* Lab Results Component Value Date/Time Folate 5.1 05/18/2019 01:50 PM  
  
No results for input(s): PH, PCO2, PO2 in the last 72 hours. No results for input(s): CPK, CKNDX, TROIQ in the last 72 hours. No lab exists for component: CPKMB No results found for: CHOL, CHOLX, CHLST, CHOLV, HDL, LDL, LDLC, DLDLP, TGLX, TRIGL, TRIGP, CHHD, CHHDX Lab Results Component Value Date/Time Glucose (POC) 118 (H) 05/19/2019 11:30 AM  
 Glucose (POC) 110 (H) 05/19/2019 06:41 AM  
 Glucose (POC) 123 (H) 05/18/2019 09:32 PM  
 Glucose (POC) 104 (H) 05/18/2019 04:51 PM  
 Glucose (POC) 107 (H) 05/18/2019 11:15 AM  
 
Lab Results Component Value Date/Time Color YELLOW/STRAW 05/17/2019 05:03 PM  
 Appearance CLEAR 05/17/2019 05:03 PM  
 Specific gravity 1.008 05/17/2019 05:03 PM  
 pH (UA) 6.0 05/17/2019 05:03 PM  
 Protein TRACE (A) 05/17/2019 05:03 PM  
 Glucose NEGATIVE  05/17/2019 05:03 PM  
 Ketone NEGATIVE  05/17/2019 05:03 PM  
 Bilirubin NEGATIVE  05/17/2019 05:03 PM  
 Urobilinogen 0.2 05/17/2019 05:03 PM  
 Nitrites NEGATIVE  05/17/2019 05:03 PM  
 Leukocyte Esterase TRACE (A) 05/17/2019 05:03 PM  
 Epithelial cells FEW 05/17/2019 05:03 PM  
 Bacteria NEGATIVE  05/17/2019 05:03 PM  
 WBC 0-4 05/17/2019 05:03 PM  
 RBC 0-5 05/17/2019 05:03 PM  
 
 
 
Medications Reviewed:  
 
Current Facility-Administered Medications Medication Dose Route Frequency  0.45% sodium chloride infusion  75 mL/hr IntraVENous CONTINUOUS  
 atorvastatin (LIPITOR) tablet 40 mg  40 mg Oral QHS  metoprolol succinate (TOPROL-XL) XL tablet 100 mg  100 mg Oral DAILY  sodium chloride (NS) flush 5-40 mL  5-40 mL IntraVENous Q8H  
 sodium chloride (NS) flush 5-40 mL  5-40 mL IntraVENous PRN  
 heparin (porcine) injection 5,000 Units  5,000 Units SubCUTAneous Q8H  
 bicalutamide (CASODEX) tablet 50 mg  50 mg Oral DAILY  glucose chewable tablet 16 g  4 Tab Oral PRN  
  dextrose (D50W) injection syrg 12.5-25 g  25-50 mL IntraVENous PRN  
 glucagon (GLUCAGEN) injection 1 mg  1 mg IntraMUSCular PRN  
 insulin lispro (HUMALOG) injection   SubCUTAneous AC&HS  
 
______________________________________________________________________ EXPECTED LENGTH OF STAY: - - - 
ACTUAL LENGTH OF STAY:          2 Boom Rutherford MD

## 2019-05-20 LAB
ANION GAP SERPL CALC-SCNC: 10 MMOL/L (ref 5–15)
BUN SERPL-MCNC: 27 MG/DL (ref 6–20)
BUN/CREAT SERPL: 9 (ref 12–20)
CALCIUM SERPL-MCNC: 8 MG/DL (ref 8.5–10.1)
CHLORIDE SERPL-SCNC: 109 MMOL/L (ref 97–108)
CO2 SERPL-SCNC: 22 MMOL/L (ref 21–32)
CREAT SERPL-MCNC: 3.02 MG/DL (ref 0.7–1.3)
FERRITIN SERPL-MCNC: 593 NG/ML (ref 26–388)
GLUCOSE BLD STRIP.AUTO-MCNC: 103 MG/DL (ref 65–100)
GLUCOSE BLD STRIP.AUTO-MCNC: 110 MG/DL (ref 65–100)
GLUCOSE BLD STRIP.AUTO-MCNC: 115 MG/DL (ref 65–100)
GLUCOSE BLD STRIP.AUTO-MCNC: 166 MG/DL (ref 65–100)
GLUCOSE SERPL-MCNC: 89 MG/DL (ref 65–100)
IRON SATN MFR SERPL: 71 % (ref 20–50)
IRON SERPL-MCNC: 102 UG/DL (ref 35–150)
POTASSIUM SERPL-SCNC: 3.4 MMOL/L (ref 3.5–5.1)
SERVICE CMNT-IMP: ABNORMAL
SODIUM SERPL-SCNC: 141 MMOL/L (ref 136–145)
TIBC SERPL-MCNC: 144 UG/DL (ref 250–450)

## 2019-05-20 PROCEDURE — 74011250636 HC RX REV CODE- 250/636: Performed by: FAMILY MEDICINE

## 2019-05-20 PROCEDURE — 83540 ASSAY OF IRON: CPT

## 2019-05-20 PROCEDURE — 80048 BASIC METABOLIC PNL TOTAL CA: CPT

## 2019-05-20 PROCEDURE — 36415 COLL VENOUS BLD VENIPUNCTURE: CPT

## 2019-05-20 PROCEDURE — 82728 ASSAY OF FERRITIN: CPT

## 2019-05-20 PROCEDURE — 74011250637 HC RX REV CODE- 250/637: Performed by: INTERNAL MEDICINE

## 2019-05-20 PROCEDURE — 65660000000 HC RM CCU STEPDOWN

## 2019-05-20 PROCEDURE — 82962 GLUCOSE BLOOD TEST: CPT

## 2019-05-20 PROCEDURE — 74011250637 HC RX REV CODE- 250/637: Performed by: FAMILY MEDICINE

## 2019-05-20 RX ORDER — POTASSIUM CHLORIDE 750 MG/1
40 TABLET, FILM COATED, EXTENDED RELEASE ORAL
Status: COMPLETED | OUTPATIENT
Start: 2019-05-20 | End: 2019-05-20

## 2019-05-20 RX ADMIN — HEPARIN SODIUM 5000 UNITS: 5000 INJECTION INTRAVENOUS; SUBCUTANEOUS at 00:00

## 2019-05-20 RX ADMIN — Medication 10 ML: at 04:11

## 2019-05-20 RX ADMIN — HEPARIN SODIUM 5000 UNITS: 5000 INJECTION INTRAVENOUS; SUBCUTANEOUS at 13:07

## 2019-05-20 RX ADMIN — METOPROLOL SUCCINATE 100 MG: 50 TABLET, EXTENDED RELEASE ORAL at 09:40

## 2019-05-20 RX ADMIN — Medication 10 ML: at 14:00

## 2019-05-20 RX ADMIN — ATORVASTATIN CALCIUM 40 MG: 40 TABLET, FILM COATED ORAL at 00:00

## 2019-05-20 RX ADMIN — HEPARIN SODIUM 5000 UNITS: 5000 INJECTION INTRAVENOUS; SUBCUTANEOUS at 22:09

## 2019-05-20 RX ADMIN — Medication 10 ML: at 22:09

## 2019-05-20 RX ADMIN — Medication 10 ML: at 00:00

## 2019-05-20 RX ADMIN — BICALUTAMIDE 50 MG: 50 TABLET ORAL at 09:40

## 2019-05-20 RX ADMIN — POTASSIUM CHLORIDE 40 MEQ: 750 TABLET, EXTENDED RELEASE ORAL at 13:07

## 2019-05-20 RX ADMIN — ATORVASTATIN CALCIUM 40 MG: 40 TABLET, FILM COATED ORAL at 22:08

## 2019-05-20 NOTE — PROGRESS NOTES
Nephrology Progress Note Maida Garcia Date of Admission : 5/17/2019 CC:  Follow up for JANN Assessment and Plan JANN: 
- likely 2/2 obstruction 
- stable and pt voiding 
- d/c IVF and monitor B/l hydronephrosis w/ urinary retention: 
- voiding well - per urology Metastatic prostate cancer: 
- on casodex Hypokalemia: 
- oral repletion ordered HTN: 
- cont metoprolol Anemia: 
- hgb stable - per hematology DM2: 
- per primary team  
 
 
Interval History: 
Seen and examined. Voiding well. Cr down. No cp, sob, n/v/d reported at this time. Current Medications: all current  Medications have been eviewed in [facility] Review of Systems: Pertinent items are noted in HPI. Objective: 
Vitals:   
Vitals:  
 05/20/19 0009 05/20/19 0625 05/20/19 0900 05/20/19 1133 BP: (!) 145/92  147/80 138/87 Pulse: 67  71 69 Resp: 18 18 18 Temp: 97.9 °F (36.6 °C)  98 °F (36.7 °C) 98 °F (36.7 °C) SpO2: 98%  95% 95% Weight:  (!) 161.4 kg (355 lb 13.2 oz) Height:      
 
Intake and Output: 
05/20 0701 - 05/20 1900 In: -  
Out: 800 [Urine:800] 05/18 1901 - 05/20 0700 In: 2321.8 [P.O.:480; I.V.:1841.8] Out: 9253 [[id number]] Physical Examination:General: NAD,Conversant Neck:  Supple, no mass Resp:  Lungs CTA B/L, no wheezing , normal respiratory effort CV:  RRR,  no murmur or rub, chronic LE edema GI:  Soft, NT, + Bowel sounds, no hepatosplenomegaly Neurologic:  Non focal 
Psych:             AAO x 3 appropriate affect Skin:  No Rash :  No rogers []    High complexity decision making was performed 
[]    Patient is at high-risk of decompensation with multiple organ involvement Lab Data Personally Reviewed: I have reviewed all the pertinent labs, microbiology data and radiology studies during assessment. Recent Labs  
  05/20/19 
0430 05/19/19 
0500 05/18/19 
0351 05/17/19 
1504  142 142 141  
K 3.4* 3.5 3.4* 3.7 * 111* 111* 107 CO2 22 21 23 22 GLU 89 98 75 87 BUN 27* 30* 31* 31* CREA 3.02* 3.45* 3.77* 3.78* CA 8.0* 8.2* 8.3* 8.8 MG  --   --  1.7  --   
PHOS  --   --  3.7  --   
ALB  --  2.4*  --  2.9*  
SGOT  --  23  --  28 ALT  --  10*  --  12 Recent Labs 05/19/19 
0500 05/18/19 
0351 05/17/19 
1504 WBC 4.4 4.8 5.7 HGB 7.8* 7.8* 8.7* HCT 24.1* 24.5* 27.4*  
* 137* 144* No results found for: SDES No results found for: CULT Recent Results (from the past 24 hour(s)) GLUCOSE, POC Collection Time: 05/19/19  4:56 PM  
Result Value Ref Range Glucose (POC) 117 (H) 65 - 100 mg/dL Performed by Lorrie Patrick GLUCOSE, POC Collection Time: 05/19/19  9:54 PM  
Result Value Ref Range Glucose (POC) 115 (H) 65 - 100 mg/dL Performed by Time Jorge METABOLIC PANEL, BASIC Collection Time: 05/20/19  4:30 AM  
Result Value Ref Range Sodium 141 136 - 145 mmol/L Potassium 3.4 (L) 3.5 - 5.1 mmol/L Chloride 109 (H) 97 - 108 mmol/L  
 CO2 22 21 - 32 mmol/L Anion gap 10 5 - 15 mmol/L Glucose 89 65 - 100 mg/dL BUN 27 (H) 6 - 20 MG/DL Creatinine 3.02 (H) 0.70 - 1.30 MG/DL  
 BUN/Creatinine ratio 9 (L) 12 - 20 GFR est AA 26 (L) >60 ml/min/1.73m2 GFR est non-AA 22 (L) >60 ml/min/1.73m2 Calcium 8.0 (L) 8.5 - 10.1 MG/DL FERRITIN Collection Time: 05/20/19  4:30 AM  
Result Value Ref Range Ferritin 593 (H) 26 - 388 NG/ML  
IRON PROFILE Collection Time: 05/20/19  4:30 AM  
Result Value Ref Range Iron 102 35 - 150 ug/dL TIBC 144 (L) 250 - 450 ug/dL Iron % saturation 71 (H) 20 - 50 % GLUCOSE, POC Collection Time: 05/20/19  6:30 AM  
Result Value Ref Range Glucose (POC) 103 (H) 65 - 100 mg/dL Performed by Joel Guadarrama GLUCOSE, POC Collection Time: 05/20/19 11:40 AM  
Result Value Ref Range Glucose (POC) 166 (H) 65 - 100 mg/dL Performed by Marlene Nguyen MD 
[address] [place] [address]  
 [address] [facility] Phone - [phone] Fax - [phone] 
[email or web address]

## 2019-05-20 NOTE — PROGRESS NOTES
[employer] Hematology Oncology Consultation Reason for consult: Prostate cancer, anemia, thrombocytopenia, Jeremiah Ford Admitting Diagnosis: Prostate cancer metastatic to multiple sites ([facility] [address] Melaniklarissanirmala Yoo; Acute kidney injury superimposed on chronic kidney disease ([address] [N17.9, N18.9]; Hydroureteronephrosis [N13.30]; Anemia [D64.9] HPI: Coy Guevara is a 62 y.o. male who I was asked to see in consultation at the request of Dr. Stefanie Rust for evaluation for prostate cancer. Pt was admitted on 5/17/2019 History: The patient is a very pleasant 78-year-old gentleman who has been doing well he went for routine checkup with his family doctor at which time his PSA was found to be elevated he was then sent to a urologist a CT and bone scans were ordered which showed widespread metastatic disease involving his abdomen and his lymph nodes as well as bones he also had bladder outlet obstruction with an elevated creatinine for that reason was admitted. He had a little difficulty with bladder drainage so the urology department is following him with that particular problem. He was started on Casodex 50 mg a day and is continuing with that drug to help given anti-androgen effect. He is due to started on Lupron on the 28th of this month. The patient really denies any symptoms related to his prostate is not having any bone issues nor is he having any problems with voiding up until the present time. 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 He has had some problems with his breathing and presently uses a CPAP. He also has problems with obesity. And is presently disabled due to an injury that happened long ago 5/20/2019 the patient seems to continue to be improving. His creatinine is down to slightly over 3 with a creatinine clearance of about 22.   His erythropoietin level is pending. His haptoglobin and LDH are normal.  His iron studies are normal.  His B12 and folate are normal.  The patient will need erythropoietin shots to keep his hemoglobin up. Plan on helping him with that as an outpatient. Past Medical History:  
Diagnosis Date  Atrial fibrillation ([facility]  CHF (congestive heart failure) ([facility] [address]  [facility]  Diabetes ([facility] pre-diabetic on metformin  Hypertension  Prostate enlargement Past Surgical History:  
Procedure Laterality Date  HX HERNIA REPAIR    
 ventral and inguinal  
 HX ORTHOPAEDIC    
 hip surgery Prior to Admission medications Medication Sig Start Date End Date Taking? Authorizing Provider  
albuterol (PROVENTIL VENTOLIN) 2.5 mg /3 mL (0.083 %) nebulizer solution 2.5 mg by Nebulization route every four (4) hours as needed. 5/10/19   Melba Winter MD  
VENTOLIN HFA 90 mcg/actuation inhaler Take 1 Puff by inhalation every four (4) hours as needed. 5/10/19   Melba Winter MD  
atorvastatin (LIPITOR) 40 mg tablet Take 40 mg by mouth daily. 5/10/19   Melba Winter MD  
fluticasone propionate (FLONASE) 50 mcg/actuation nasal spray 2 Sprays by Both Nostrils route daily. 5/10/19   Melba Winter MD  
furosemide (LASIX) 20 mg tablet Take 20 mg by mouth daily. 5/10/19   Melba Winter MD  
furosemide (LASIX) 40 mg tablet Take 40 mg by mouth daily. 5/10/19   Melba Winter MD  
ipratropium (ATROVENT) 0.02 % soln 0.5 mg every four (4) hours as needed. 5/10/19   Melba Winter MD  
metoprolol succinate (TOPROL-XL) 100 mg tablet TK 1 T PO QD 5/16/19   Melba Winter MD  
bicalutamide (CASODEX) 50 mg tablet Take 50 mg by mouth daily. Provider, Historical  
 
No Known Allergies Social History Tobacco Use  Smoking status: Never Smoker  Smokeless tobacco: Never Used Substance Use Topics  Alcohol use: Yes Alcohol/week: 1.8 oz Types: 3 Cans of beer per week Frequency: Never Comment: on weekends only History reviewed. No pertinent family history. Current Medications: 
Current Facility-Administered Medications Medication Dose Route Frequency  0.45% sodium chloride infusion  75 mL/hr IntraVENous CONTINUOUS  
 atorvastatin (LIPITOR) tablet 40 mg  40 mg Oral QHS  metoprolol succinate (TOPROL-XL) XL tablet 100 mg  100 mg Oral DAILY  sodium chloride (NS) flush 5-40 mL  5-40 mL IntraVENous Q8H  
 sodium chloride (NS) flush 5-40 mL  5-40 mL IntraVENous PRN  
 heparin (porcine) injection 5,000 Units  5,000 Units SubCUTAneous Q8H  
 bicalutamide (CASODEX) tablet 50 mg  50 mg Oral DAILY  glucose chewable tablet 16 g  4 Tab Oral PRN  
 dextrose (D50W) injection syrg 12.5-25 g  25-50 mL IntraVENous PRN  
 glucagon (GLUCAGEN) injection 1 mg  1 mg IntraMUSCular PRN  
 insulin lispro (HUMALOG) injection   SubCUTAneous AC&HS Review of Systems: Comprehensive ROS was performed and all systems negative except for HPI. Physical Exam: 
Visit Vitals /80 Pulse 71 Temp 98 °F (36.7 °C) Resp 18 Ht 5' 9\" (1.753 m) Wt (!) 355 lb 13.2 oz (161.4 kg) SpO2 95% BMI 52.55 kg/m² General:  Alert, cooperative, no distress, appears stated age. Head:  Normocephalic, without obvious abnormality, atraumatic. Eyes:  Conjunctivae/corneas clear. PERRL, EOMs intact. Throat: Lips, mucosa, and tongue normal.   
Neck: Supple, symmetrical, trachea midline, no adenopathy Back:   Symmetric, no curvature. ROM normal. No CVA tenderness. Lungs:   Clear to auscultation bilaterally. Chest wall:    
Heart:  Regular rate and rhythm, S1, S2 normal  
Abdomen:   Soft, non-tender. Bowel sounds normal. No masses,  No organomegaly. Extremities: Extremities normal, atraumatic, no cyanosis with 3+ edema Skin: Skin color, texture, turgor normal. No rashes or lesions. With hyperpigmentation in his legs from the edema Lymph nodes: Cervical, supraclavicular, and axillary nodes normal.  
Neurologic: CNII-XII intact. Imaging: 
 
Chest X-Ray: Pending CT abdomen pelvis 1. Extensive sherrie and osseous metastases likely reflective of metastatic 
prostate neoplasm. 2. Bilateral hydronephrosis likely reflecting urinary bladder outlet obstruction 
given suspected prostate cancer projection into the bladder base. 3. IVC filter with extensive superficial abdominal wall collaterals likely 
reflects IVC occlusion at the level of filter, but this is not well evaluated on 
unenhanced exam. 
4. Dislocation of left hip arthroplasty prosthesis and capsular wear with 
superior and lateral subluxation of right hip arthroplasty prosthesis. Labs: 
 
Recent Results (from the past 24 hour(s)) GLUCOSE, POC Collection Time: 05/19/19 11:30 AM  
Result Value Ref Range Glucose (POC) 118 (H) 65 - 100 mg/dL Performed by Lynda Chaudhari GLUCOSE, POC Collection Time: 05/19/19  4:56 PM  
Result Value Ref Range Glucose (POC) 117 (H) 65 - 100 mg/dL Performed by Lynda Chaudhari GLUCOSE, POC Collection Time: 05/19/19  9:54 PM  
Result Value Ref Range Glucose (POC) 115 (H) 65 - 100 mg/dL Performed by Time Jorge METABOLIC PANEL, BASIC Collection Time: 05/20/19  4:30 AM  
Result Value Ref Range Sodium 141 136 - 145 mmol/L Potassium 3.4 (L) 3.5 - 5.1 mmol/L Chloride 109 (H) 97 - 108 mmol/L  
 CO2 22 21 - 32 mmol/L Anion gap 10 5 - 15 mmol/L Glucose 89 65 - 100 mg/dL BUN 27 (H) 6 - 20 MG/DL Creatinine 3.02 (H) 0.70 - 1.30 MG/DL  
 BUN/Creatinine ratio 9 (L) 12 - 20 GFR est AA 26 (L) >60 ml/min/1.73m2 GFR est non-AA 22 (L) >60 ml/min/1.73m2 Calcium 8.0 (L) 8.5 - 10.1 MG/DL FERRITIN Collection Time: 05/20/19  4:30 AM  
Result Value Ref Range Ferritin 593 (H) 26 - 388 NG/ML  
IRON PROFILE Collection Time: 05/20/19  4:30 AM  
Result Value Ref Range Iron 102 35 - 150 ug/dL TIBC 144 (L) 250 - 450 ug/dL Iron % saturation 71 (H) 20 - 50 % GLUCOSE, POC Collection Time: 05/20/19  6:30 AM  
Result Value Ref Range Glucose (POC) 103 (H) 65 - 100 mg/dL Performed by Lora Vanegas Assessment and Recommendations: Clement Guzman is a  62y.o. year old seen in consultation at the request of Dr. Thierno Braga for metastatic prostate cancer, anemia and thrombocytopenia Plan: 1. Go ahead and continue with Casodex 50 mg a day. 2.  He will be starting on [facility] antagonist on the 28th. 3.  The anemia is probably secondary to chronic renal dysfunction in addition to bone marrow replacement from his prostate cancer. We will check some blood work. The patient will probably need to start on erythropoietin shots 4. Thrombocytopenia is most likely secondary to [place]. 5.  Need to continue to work on weight reduction. 6.  We will also need to get him on Xgeva to help with his bone metastasis. 7.  We will plan on seeing him in the office after discharge to take care of the hemoglobin problem and we will get him on Xgeva if the urologist have not Ezio Hopson MD 
5/20/2019

## 2019-05-20 NOTE — PROGRESS NOTES
Reason for Admission:   Prostate Cancer, metastatic to multiple sites RRAT Score:     20 Do you (patient/family) have any concerns for transition/discharge? Patient is originally from [place] and has been in [place] since 2010. Patient gets disability benefits and reports having medicaid benefits as well. CM submitted email to  to review benefits. Patient Access provided patient with Care Card application Plan for utilizing home health:   TBD - has a hx of HH with unknown agency, which was set up by [facility] last year Current Advanced Directive/Advance Care Plan:  FULL CODE; not on file Likelihood of readmission? HIGH Transition of Care Plan:      Patient has prostate cancer with mets to multiple sites, JANN, hydroureteronephrosis, and anemia. Patient was having difficulty urinating, but kidney function continues to improve and urine output is getting better. Patient is being followed by nephrology, urology, oncology, and ortho. Patient's plan includes: ongoing IVF, bladder scan, echo, bone scan, CT, IVF, PT, and OT. Patient cannot have rogers place, so he may require a nephrostomy tube. Patient may also have a cardio consult. Patient is concerned about financial constraints. Patient indicated concerns about financial obligations. Patient was provided a Care Card application via patient access team.  Patient also indicated that he has Medicaid; CM sent email to  to request verification of medicaid benefits. Care Management Interventions PCP Verified by CM: Yes(followed by Dr. Leonarda Girard) Last Visit to PCP: 05/16/19 Palliative Care Criteria Met (RRAT>21 & CHF Dx)?: No 
Mode of Transport at Discharge: Other (see comment)(Girlfriend at bedside to transport) Transition of Care Consult (CM Consult): Discharge Planning MyChart Signup: No 
Discharge Durable Medical Equipment: No(Has DME of: CPAP, crutches, and glucometer) Health Maintenance Reviewed: Yes(CM met with patient, with girlfriend at bedside, with patient alert and oriented x 4) Physical Therapy Consult: No 
Occupational Therapy Consult: No 
Speech Therapy Consult: No 
Current Support Network: Lives with Spouse, Own Home, Other(Lives with girlfriend, and granddaughter, in a single story home with 8 external steps) Confirm Follow Up Transport: Self(independent in ADLs, to include driving) Plan discussed with Pt/Family/Caregiver: Yes(girlfriend at bedside) [employer] Information Provided?: No 
Discharge Location Discharge Placement: Unable to determine at this time(Lives with family in a single story home with external steps) CRM: Keyla Townsend, MPH, [address] [place] [address]; Z: [phone]

## 2019-05-20 NOTE — PROGRESS NOTES
Problem: Pressure Injury - Risk of 
Goal: *Prevention of pressure injury Description Document Binh Scale and appropriate interventions in the flowsheet. Outcome: Progressing Towards Goal 
  
Problem: Patient Education: Go to Patient Education Activity Goal: Patient/Family Education Outcome: Progressing Towards Goal 
  
Problem: Falls - Risk of 
Goal: *Absence of Falls Description Document John Rodriguez Fall Risk and appropriate interventions in the flowsheet. Outcome: Progressing Towards Goal 
  
Problem: Patient Education: Go to Patient Education Activity Goal: Patient/Family Education Outcome: Progressing Towards Goal

## 2019-05-20 NOTE — ROUTINE PROCESS
Bedside and Verbal shift change report given to MONTY Jones (oncoming nurse) by Lilly Stewart (offgoing nurse). Report included the following information SBAR, Kardex, Procedure Summary, Intake/Output, MAR, Accordion and Recent Results.

## 2019-05-20 NOTE — CONSULTS
ORTHOPAEDIC CONSULT NOTE    Subjective:     Date of Consultation:  May 20, 2019  Referring Physician:  Jude Smith is a 62 y.o. male with PMH significant for obesity, prostate cancer likely metastatic, s/p bilateral JUAN C with at least one revision all done in [place] is seen for hip dislocations seen on CT of Abd/Pelv. Per patient he had a revision surgery on his left hip around 2000 and subsequently had problems with dislocations since to the point that his index surgeon stated there was nothing they could do and they recommended leaving prosthesis dislocated. Patient states that he has been able to ambulate as needed with crutches for the past 18yrs and really does not have any mobility issues other than needing help to initiate movement for the left leg. Patient denies any new hip or groin pain. He denies tingling or numbness. He denies recent trauma. He states that he was seen by Dr Rip Sanchez at [facility] several years ago who also did not recommend any surgical maneuvers for the hips due to patient relatively good mobility. We have been asked to see the patient regarding his hips in light of findings on CT. Patient Active Problem List    Diagnosis Date Noted    ELI (nonalcoholic steatohepatitis) 05/18/2019    Morbid obesity ([address] 05/18/2019    Hydroureteronephrosis 05/17/2019    Anemia 05/17/2019    Prostate cancer metastatic to multiple sites ([address] 05/17/2019    Acute kidney injury superimposed on chronic kidney disease ([address] 05/17/2019    Atrial fibrillation ([address]      History reviewed. No pertinent family history. Social History     Tobacco Use    Smoking status: Never Smoker    Smokeless tobacco: Never Used   Substance Use Topics    Alcohol use:  Yes     Alcohol/week: 1.8 oz     Types: 3 Cans of beer per week     Frequency: Never     Comment: on weekends only     Past Medical History:   Diagnosis Date    Atrial fibrillation ([address]     CHF (congestive heart failure) (HCC)     Chronic obstructive pulmonary disease (HCC)     Diabetes ([facility]     pre-diabetic on metformin    Hypertension     Prostate enlargement       Past Surgical History:   Procedure Laterality Date    HX HERNIA REPAIR      ventral and inguinal    HX ORTHOPAEDIC      hip surgery      Prior to Admission medications    Medication Sig Start Date End Date Taking? Authorizing Provider   albuterol (PROVENTIL VENTOLIN) 2.5 mg /3 mL (0.083 %) nebulizer solution 2.5 mg by Nebulization route every four (4) hours as needed. 5/10/19   Melba Winter MD   VENTOLIN HFA 90 mcg/actuation inhaler Take 1 Puff by inhalation every four (4) hours as needed. 5/10/19   Melba Winter MD   atorvastatin (LIPITOR) 40 mg tablet Take 40 mg by mouth daily. 5/10/19   Melba Winter MD   fluticasone propionate (FLONASE) 50 mcg/actuation nasal spray 2 Sprays by Both Nostrils route daily. 5/10/19   Melba Winter MD   furosemide (LASIX) 20 mg tablet Take 20 mg by mouth daily. 5/10/19   Melba Winter MD   furosemide (LASIX) 40 mg tablet Take 40 mg by mouth daily. 5/10/19   Melba Winter MD   ipratropium (ATROVENT) 0.02 % soln 0.5 mg every four (4) hours as needed. 5/10/19   Melba Winter MD   metoprolol succinate (TOPROL-XL) 100 mg tablet TK 1 T PO QD 5/16/19   Melba Winter MD   bicalutamide (CASODEX) 50 mg tablet Take 50 mg by mouth daily.     Provider, Historical     Current Facility-Administered Medications   Medication Dose Route Frequency    potassium chloride SR (KLOR-CON 10) tablet 40 mEq  40 mEq Oral NOW    atorvastatin (LIPITOR) tablet 40 mg  40 mg Oral QHS    metoprolol succinate (TOPROL-XL) XL tablet 100 mg  100 mg Oral DAILY    sodium chloride (NS) flush 5-40 mL  5-40 mL IntraVENous Q8H    sodium chloride (NS) flush 5-40 mL  5-40 mL IntraVENous PRN    heparin (porcine) injection 5,000 Units  5,000 Units SubCUTAneous Q8H    bicalutamide (CASODEX) tablet 50 mg  50 mg Oral DAILY    glucose chewable tablet 16 g  4 Tab Oral PRN    dextrose (D50W) injection syrg 12.5-25 g  25-50 mL IntraVENous PRN    glucagon (GLUCAGEN) injection 1 mg  1 mg IntraMUSCular PRN    insulin lispro (HUMALOG) injection   SubCUTAneous AC&HS      No Known Allergies     Review of Systems:  Pertinent items are noted in HPI.     Mental Status: no dementia    Objective:     Patient Vitals for the past 8 hrs:   BP Temp Pulse Resp SpO2 Weight   19 1133 138/87 98 °F (36.7 °C) 69 18 95 %    19 0900 147/80 98 °F (36.7 °C) 71 18 95 %    19 0625      (!) 161.4 kg (355 lb 13.2 oz)     Temp (24hrs), Av.1 °F (36.7 °C), Min:97.9 °F (36.6 °C), Max:98.4 °F (36.9 °C)      EXAM: Awake and alert lying in bed; NADl agreeable to exam  Legs are grossly obese with widespread venous stasis changes  No pain with internal/external rotation of hips but limited ROM  Can flex hips actively  5/5 strength for ankle plantar and dorsiflexion  Distal sensory function grossly intact  Pulses not palpable due to body habitus    Imaging Review: CT shows dislocation of left hip with superior migration and lateral subluxation vs full dislocation of right hip with acetabular component compromise as well    Labs:   Recent Results (from the past 24 hour(s))   GLUCOSE, POC    Collection Time: 19  4:56 PM   Result Value Ref Range    Glucose (POC) 117 (H) 65 - 100 mg/dL    Performed by Blue Mitchell    GLUCOSE, POC    Collection Time: 19  9:54 PM   Result Value Ref Range    Glucose (POC) 115 (H) 65 - 100 mg/dL    Performed by Yvette Law    METABOLIC PANEL, BASIC    Collection Time: 19  4:30 AM   Result Value Ref Range    Sodium 141 136 - 145 mmol/L    Potassium 3.4 (L) 3.5 - 5.1 mmol/L    Chloride 109 (H) 97 - 108 mmol/L    CO2 22 21 - 32 mmol/L    Anion gap 10 5 - 15 mmol/L    Glucose 89 65 - 100 mg/dL    BUN 27 (H) 6 - 20 MG/DL    Creatinine 3.02 (H) 0.70 - 1.30 MG/DL    BUN/Creatinine ratio 9 (L) 12 - 20      GFR est AA 26 (L) >60 ml/min/1.73m2    GFR est non-AA 22 (L) >60 ml/min/1.73m2    Calcium 8.0 (L) 8.5 - 10.1 MG/DL   FERRITIN    Collection Time: 05/20/19  4:30 AM   Result Value Ref Range    Ferritin 593 (H) 26 - 388 NG/ML   IRON PROFILE    Collection Time: 05/20/19  4:30 AM   Result Value Ref Range    Iron 102 35 - 150 ug/dL    TIBC 144 (L) 250 - 450 ug/dL    Iron % saturation 71 (H) 20 - 50 %   GLUCOSE, POC    Collection Time: 05/20/19  6:30 AM   Result Value Ref Range    Glucose (POC) 103 (H) 65 - 100 mg/dL    Performed by Briana Vela, POC    Collection Time: 05/20/19 11:40 AM   Result Value Ref Range    Glucose (POC) 166 (H) 65 - 100 mg/dL    Performed by Devaughn ROGER          Impression:     Principal Problem:    Acute kidney injury superimposed on chronic kidney disease ([facility] (5/17/2019)    Active Problems:    Hydroureteronephrosis (5/17/2019)      Anemia (5/17/2019)      Prostate cancer metastatic to multiple sites [facility]) (5/17/2019)      ELI (nonalcoholic steatohepatitis) (5/18/2019)      Morbid obesity ([facility] (5/18/2019)        Plan:     Patient with known bilateral hip dislocations and pathology following numerous surgeries out of the area.  He is able to ambulate with the use of crutches; no indication for reduction of joints  No Orthopedic intervention needed  Can WBAT with assistance  PT/OT as needed  Follow-up with Dr Maxi Abdi at [facility] for any further issues  Medical management per primary teams  Orthopedics will sign off; re-consult with any further questions    Dr Baron Hashimoto aware of patient and in agreement with current plan of care    Mora Osborn PA-C  Orthopedic Trauma Service  [address]

## 2019-05-20 NOTE — PROGRESS NOTES
Hospitalist Progress Note Nathalie Bueno MD 
Answering service: [phone] OR [phone] from in house phone Cell:   
  
Date of Service:  2019 NAME:  Miguel Portillo :  1965 MRN:  [id number] Admission Summary:  
rerral from CT for IV hydration. PCP called patient while he was still in radiology and advised him that his kidney function was elevated and needed to be seen. Interval history / Subjective:  
   
Patient examined bedside No new problems Again he never complained of anything at first place Assessment & Plan:  
 
 
JANN Nephrology has been consculted and he has obstruction as cause of her Sherrine Sparrow and has been starte d on NS , changed to half NS And stopped don  Cr is 3 , I talked to PCP and his baseline Cr was normal in 2018 Prostate cancer CT shows Extensive sherrie and osseous metastases likely reflective of metastatic 
prostate neoplasm. Continue casodex and he is getting started on lupron but as outopatient Our pharmacy doesnot carry HERNANDEZ with hydronephrosis Carissa Rubén Bilateral hydronephrosis likely reflecting urinary bladder outlet obstruction 
given suspected prostate cancer projection into the bladder base Urology couldot place rogers in him May need nephrostomy ? I discussed in length with Dr Kerwin Park and Dr Ivan Doan , and they are not keen on any procedures in this patient  
im worried about Creatinine of 3 Anemia  
7 .8 Stool studies , iron studies   
likley from cancer and kidney dysfn Occluded IVC filter with collaterals Dislocation of left hip arthoplasty Consult  Ortho and they are ok with it as he has it for many years and asymtomatic History of heart failure Type unknown ? He is poor historian Code status:  Full DVT prophylaxis: none Care Plan discussed with: Patient/Family Disposition: TBD Hospital Problems  Never Reviewed Codes Class Noted POA ELI (nonalcoholic steatohepatitis) ICD-10-CM: U05.89 ICD-9-CM: 571.8  5/18/2019 Unknown Morbid obesity ([facility] ICD-10-CM: E66.01 
ICD-9-CM: 278.01  5/18/2019 Unknown Hydroureteronephrosis ICD-10-CM: N13.30 ICD-9-CM: 308  5/17/2019 Unknown Anemia ICD-10-CM: D64.9 ICD-9-CM: 285.9  5/17/2019 Unknown Prostate cancer metastatic to multiple sites [facility]) ICD-10-CM: B13 ICD-9-CM: 185, 199.0  5/17/2019 Unknown * (Principal) Acute kidney injury superimposed on chronic kidney disease ([facility] ICD-10-CM: N17.9, N18.9 ICD-9-CM: 866.00, 585.9  5/17/2019 Unknown Review of Systems: A comprehensive review of systems was negative except for that written in the HPI. Vital Signs:  
 Last 24hrs VS reviewed since prior progress note. Most recent are: 
Visit Vitals /88 Pulse 68 Temp 98.3 °F (36.8 °C) Resp 17 Ht 5' 9\" (1.753 m) Wt (!) 161.4 kg (355 lb 13.2 oz) SpO2 97% BMI 52.55 kg/m² Intake/Output Summary (Last 24 hours) at 5/20/2019 1912 Last data filed at 5/20/2019 1845 Gross per 24 hour Intake 480 ml Output 3700 ml Net -3220 ml Physical Examination:  
 
 
     
Constitutional:  No acute distress, obese ENT:  Oral mucous moist, oropharynx benign. Neck supple, Resp:  CTA bilaterally. No wheezing/rhonchi/rales. No accessory muscle use CV:  Regular rhythm, normal rate, no murmurs, gallops, rubs GI:  Soft, non distended, non tender. normoactive bowel sounds, no hepatosplenomegaly ,,obese Musculoskeletal:  No edema, warm, 2+ pulses throughout Neurologic:  Moves all extremities. AAOx3, CN II-XII reviewed Data Review:  
 Review and/or order of clinical lab test 
 
 
Labs:  
 
Recent Labs 05/19/19 
0500 05/18/19 
0351 WBC 4.4 4.8 HGB 7.8* 7.8* HCT 24.1* 24.5*  
* 137* Recent Labs  
  05/20/19 
0430 05/19/19 
0500 05/18/19 
0351  142 142 K 3.4* 3.5 3.4*  
* 111* 111* CO2 22 21 23 BUN 27* 30* 31* CREA 3.02* 3.45* 3.77* GLU 89 98 75  
CA 8.0* 8.2* 8.3*  
MG  --   --  1.7 PHOS  --   --  3.7 Recent Labs 05/19/19 
0500 SGOT 23 ALT 10* * TBILI 0.5 TP 6.3* ALB 2.4*  
GLOB 3.9 No results for input(s): INR, PTP, APTT in the last 72 hours. No lab exists for component: INREXT, INREXT Recent Labs  
  05/20/19 
0430 05/18/19 
1350 TIBC 144* 149* PSAT 71* 61* FERR 593*  --   
  
Lab Results Component Value Date/Time Folate 5.1 05/18/2019 01:50 PM  
  
No results for input(s): PH, PCO2, PO2 in the last 72 hours. No results for input(s): CPK, CKNDX, TROIQ in the last 72 hours. No lab exists for component: CPKMB No results found for: CHOL, CHOLX, CHLST, CHOLV, HDL, LDL, LDLC, DLDLP, TGLX, TRIGL, TRIGP, CHHD, CHHDX Lab Results Component Value Date/Time Glucose (POC) 115 (H) 05/20/2019 04:52 PM  
 Glucose (POC) 166 (H) 05/20/2019 11:40 AM  
 Glucose (POC) 103 (H) 05/20/2019 06:30 AM  
 Glucose (POC) 115 (H) 05/19/2019 09:54 PM  
 Glucose (POC) 117 (H) 05/19/2019 04:56 PM  
 
Lab Results Component Value Date/Time Color YELLOW/STRAW 05/17/2019 05:03 PM  
 Appearance CLEAR 05/17/2019 05:03 PM  
 Specific gravity 1.008 05/17/2019 05:03 PM  
 pH (UA) 6.0 05/17/2019 05:03 PM  
 Protein TRACE (A) 05/17/2019 05:03 PM  
 Glucose NEGATIVE  05/17/2019 05:03 PM  
 Ketone NEGATIVE  05/17/2019 05:03 PM  
 Bilirubin NEGATIVE  05/17/2019 05:03 PM  
 Urobilinogen 0.2 05/17/2019 05:03 PM  
 Nitrites NEGATIVE  05/17/2019 05:03 PM  
 Leukocyte Esterase TRACE (A) 05/17/2019 05:03 PM  
 Epithelial cells FEW 05/17/2019 05:03 PM  
 Bacteria NEGATIVE  05/17/2019 05:03 PM  
 WBC 0-4 05/17/2019 05:03 PM  
 RBC 0-5 05/17/2019 05:03 PM  
 
 
 
Medications Reviewed:  
 
Current Facility-Administered Medications Medication Dose Route Frequency  atorvastatin (LIPITOR) tablet 40 mg  40 mg Oral QHS  metoprolol succinate (TOPROL-XL) XL tablet 100 mg  100 mg Oral DAILY  sodium chloride (NS) flush 5-40 mL  5-40 mL IntraVENous Q8H  
 sodium chloride (NS) flush 5-40 mL  5-40 mL IntraVENous PRN  
 heparin (porcine) injection 5,000 Units  5,000 Units SubCUTAneous Q8H  
 bicalutamide (CASODEX) tablet 50 mg  50 mg Oral DAILY  glucose chewable tablet 16 g  4 Tab Oral PRN  
 dextrose (D50W) injection syrg 12.5-25 g  25-50 mL IntraVENous PRN  
 glucagon (GLUCAGEN) injection 1 mg  1 mg IntraMUSCular PRN  
 insulin lispro (HUMALOG) injection   SubCUTAneous AC&HS  
 
______________________________________________________________________ EXPECTED LENGTH OF STAY: - - - 
ACTUAL LENGTH OF STAY:          3 Jose E Ag MD

## 2019-05-20 NOTE — PROGRESS NOTES
Problem: Pressure Injury - Risk of 
Goal: *Prevention of pressure injury Description Document Binh Scale and appropriate interventions in the flowsheet. Outcome: Progressing Towards Goal 
  
Problem: Falls - Risk of 
Goal: *Absence of Falls Description Document Cee Traviss Fall Risk and appropriate interventions in the flowsheet.  
Outcome: Progressing Towards Goal

## 2019-05-20 NOTE — PROGRESS NOTES
Feels well, creat improved again today. On casodex w plan to start lupron on the 29 w Dr Marcela Eden. [id number] Cathleen Holt for dc from our standpoint whenever medically stable. Med onc and neph on board. Following.

## 2019-05-21 VITALS
SYSTOLIC BLOOD PRESSURE: 149 MMHG | OXYGEN SATURATION: 96 % | WEIGHT: 315 LBS | BODY MASS INDEX: 46.65 KG/M2 | TEMPERATURE: 98 F | HEIGHT: 69 IN | RESPIRATION RATE: 18 BRPM | HEART RATE: 66 BPM | DIASTOLIC BLOOD PRESSURE: 87 MMHG

## 2019-05-21 LAB
ANION GAP SERPL CALC-SCNC: 7 MMOL/L (ref 5–15)
BASOPHILS # BLD: 0.1 K/UL (ref 0–0.1)
BASOPHILS NFR BLD: 1 % (ref 0–1)
BUN SERPL-MCNC: 29 MG/DL (ref 6–20)
BUN/CREAT SERPL: 11 (ref 12–20)
CALCIUM SERPL-MCNC: 8.2 MG/DL (ref 8.5–10.1)
CHLORIDE SERPL-SCNC: 111 MMOL/L (ref 97–108)
CO2 SERPL-SCNC: 24 MMOL/L (ref 21–32)
CREAT SERPL-MCNC: 2.76 MG/DL (ref 0.7–1.3)
DIFFERENTIAL METHOD BLD: ABNORMAL
EOSINOPHIL # BLD: 0.2 K/UL (ref 0–0.4)
EOSINOPHIL NFR BLD: 4 % (ref 0–7)
EPO SERPL-ACNC: 12 MIU/ML (ref 2.6–18.5)
ERYTHROCYTE [DISTWIDTH] IN BLOOD BY AUTOMATED COUNT: 16.4 % (ref 11.5–14.5)
GLUCOSE BLD STRIP.AUTO-MCNC: 106 MG/DL (ref 65–100)
GLUCOSE BLD STRIP.AUTO-MCNC: 111 MG/DL (ref 65–100)
GLUCOSE BLD STRIP.AUTO-MCNC: 117 MG/DL (ref 65–100)
GLUCOSE SERPL-MCNC: 101 MG/DL (ref 65–100)
HCT VFR BLD AUTO: 29.3 % (ref 36.6–50.3)
HEMOCCULT STL QL: NEGATIVE
HGB BLD-MCNC: 9.3 G/DL (ref 12.1–17)
IMM GRANULOCYTES # BLD AUTO: 0 K/UL
IMM GRANULOCYTES NFR BLD AUTO: 0 %
LYMPHOCYTES # BLD: 1.4 K/UL (ref 0.8–3.5)
LYMPHOCYTES NFR BLD: 27 % (ref 12–49)
MCH RBC QN AUTO: 30 PG (ref 26–34)
MCHC RBC AUTO-ENTMCNC: 31.7 G/DL (ref 30–36.5)
MCV RBC AUTO: 94.5 FL (ref 80–99)
METAMYELOCYTES NFR BLD MANUAL: 2 %
MONOCYTES # BLD: 0.3 K/UL (ref 0–1)
MONOCYTES NFR BLD: 6 % (ref 5–13)
NEUTS SEG # BLD: 3.1 K/UL (ref 1.8–8)
NEUTS SEG NFR BLD: 60 % (ref 32–75)
NRBC # BLD: 0 K/UL (ref 0–0.01)
NRBC BLD-RTO: 0 PER 100 WBC
PLATELET # BLD AUTO: 147 K/UL (ref 150–400)
PMV BLD AUTO: 10.2 FL (ref 8.9–12.9)
POTASSIUM SERPL-SCNC: 4 MMOL/L (ref 3.5–5.1)
RBC # BLD AUTO: 3.1 M/UL (ref 4.1–5.7)
RBC MORPH BLD: ABNORMAL
SERVICE CMNT-IMP: ABNORMAL
SODIUM SERPL-SCNC: 142 MMOL/L (ref 136–145)
WBC # BLD AUTO: 5.1 K/UL (ref 4.1–11.1)

## 2019-05-21 PROCEDURE — 36415 COLL VENOUS BLD VENIPUNCTURE: CPT

## 2019-05-21 PROCEDURE — 94761 N-INVAS EAR/PLS OXIMETRY MLT: CPT

## 2019-05-21 PROCEDURE — 82272 OCCULT BLD FECES 1-3 TESTS: CPT

## 2019-05-21 PROCEDURE — 85025 COMPLETE CBC W/AUTO DIFF WBC: CPT

## 2019-05-21 PROCEDURE — 82962 GLUCOSE BLOOD TEST: CPT

## 2019-05-21 PROCEDURE — 74011250637 HC RX REV CODE- 250/637: Performed by: FAMILY MEDICINE

## 2019-05-21 PROCEDURE — 80048 BASIC METABOLIC PNL TOTAL CA: CPT

## 2019-05-21 PROCEDURE — 74011250636 HC RX REV CODE- 250/636: Performed by: FAMILY MEDICINE

## 2019-05-21 RX ORDER — METOPROLOL SUCCINATE 100 MG/1
100 TABLET, EXTENDED RELEASE ORAL DAILY
Qty: 30 TAB | Refills: 0 | Status: SHIPPED | OUTPATIENT
Start: 2019-05-21

## 2019-05-21 RX ORDER — FLUTICASONE PROPIONATE 50 MCG
2 SPRAY, SUSPENSION (ML) NASAL DAILY
Qty: 1 BOTTLE | Refills: 0 | Status: SHIPPED | OUTPATIENT
Start: 2019-05-21

## 2019-05-21 RX ORDER — HYDRALAZINE HYDROCHLORIDE 25 MG/1
37.5 TABLET, FILM COATED ORAL
Status: DISCONTINUED | OUTPATIENT
Start: 2019-05-21 | End: 2019-05-21 | Stop reason: HOSPADM

## 2019-05-21 RX ORDER — ALBUTEROL SULFATE 90 UG/1
1 AEROSOL, METERED RESPIRATORY (INHALATION)
Qty: 1 INHALER | Refills: 0 | Status: SHIPPED | OUTPATIENT
Start: 2019-05-21

## 2019-05-21 RX ADMIN — BICALUTAMIDE 50 MG: 50 TABLET ORAL at 09:45

## 2019-05-21 RX ADMIN — Medication 10 ML: at 06:29

## 2019-05-21 RX ADMIN — HEPARIN SODIUM 5000 UNITS: 5000 INJECTION INTRAVENOUS; SUBCUTANEOUS at 06:29

## 2019-05-21 RX ADMIN — Medication 10 ML: at 14:19

## 2019-05-21 RX ADMIN — METOPROLOL SUCCINATE 100 MG: 50 TABLET, EXTENDED RELEASE ORAL at 09:38

## 2019-05-21 NOTE — ADT AUTH CERT NOTES
Per message from nurse: The baseline renal function is unknown and pt has no labs in the system prior to this admission. I have done reviews for  and . Thanks, Stiven Adair Patient Demographics Patient Name Wade Solnsdilcia Cordova 
[id number] Sex Male  
1965 Address [address] [address] Phone [phone] (Home) CSN:  
[id number] Admit Date: Admit Time Room Bed May 17, 2019  2:45  [] 01 [[id number]] Attending Providers Provider Pager From To [place] MD Gonzalo  19 Jefferson Jackson MD  19 Stacey Barrera MD  19 Ann Mullen MD  19 Meliton Garcia MD  19 Emergency Contact(s) Name Relation Home Work Mobile Ling Garcia Girlfriend [phone] Utilization Reviews  
 
   
Urologic Disease GRG - Care Day 5 (2019) by Nohelia Ledezma RN  
 
   
Review Entered Review Status 2019 09:32 Completed  
   
Criteria Review Care Day: 5 Care Date: 2019 Level of Care: Inpatient Floor Guideline Day 3 Level Of Care  
(X) * Activity level acceptable 2019 09:32:18 EDT by Nohelia Ledezma Ambulate with assist.   
  
  
Clinical Status ( ) * Renal function acceptable ( ) * Urinary status acceptable   
(X) * No infection, or status acceptable 2019 09:32:18 EDT by Nohelia Ledezma None noted. ( ) * Systemic disease absent or stable (eg, vasculitis)   
( ) * Acute complications absent   
( ) * General Discharge Criteria met Interventions  
(X) * Intake acceptable 2019 09:32:18 EDT by Nohelia Ledezma Regular diet. ( ) * No inpatient interventions needed * Milestone Additional Notes 19-  
  
VS: 98.3- P- 68- R- 17- 148/88.  O2 sat= 97% on r/a. Abnormal Labs:  
BUN= 27. Cr= 3.02.  BUN/Cr ratio= 9. GFR= 26.  Ca= 8.0.  K+= 3.4.  Chl= 109.  Ferritin= 593.  TIBC= 144.  Iron saturation= 71%. Glucose= 103. Attending Note: JANN    
Nephrology has been consculted and he has obstruction as cause of her Lue Collar and has been starte d on NS , changed to half NS Bmp in am   
   
Prostate cancer CT shows Extensive sherrie and osseous metastases likely reflective of metastatic  
prostate neoplasm. Continue casodex   
   
   
HERNANDEZ with hydronephrosis Ferdinand Underwood Bilateral hydronephrosis likely reflecting urinary bladder outlet obstruction  
given suspected prostate cancer projection into the bladder base Urology couldot place rogers in him May need nephrostomy ?  
   
Anemia   
7 .8 Stool studies , iron studies    
   
   
Occluded IVC filter with collaterals   
   
Dislocation of left hip arthoplasty Consult  Ortho   
   
History of heart failure Type unknown ? He is poor historian Heme-ONc:  
Assessment and Recommendations: Arline Zimmerman is a  61 y.o. year old seen in consultation at the request of Dr. Keara Quintana for metastatic prostate cancer, anemia and thrombocytopenia Plan: 1.  Go ahead and continue with Casodex 50 mg a day. 2.  He will be starting on Lupron on the 28th. 3.  The anemia is probably secondary to chronic renal dysfunction in addition to bone marrow replacement from his prostate cancer.  We will check some blood work. Chata Waldrop patient will probably need to start on erythropoietin shots 4.  Thrombocytopenia is most likely secondary to Laveen. 5.  Need to continue to work on weight reduction. 6.  We will also need to get him on Xgeva to help with his bone metastasis. 7.  We will plan on seeing him in the office after discharge to take care of the hemoglobin problem and we will get him on Xgeva if the urologist have not Urology Note: Afeb WBC   4.4 Cr  Down slightly to 3.4 Voiding well No pain  
Continue casodex. Await possible degarelix availability here. Hoping to avoid perc neph. Nephrology Note:  
Larwence Peeling due to post renal obstruction- other possible etiology dehydration with lasix use ·ckd ? baseline ·B/l hydronephrosis due to adenopathy - voiding well. PVR normal  
·Urinary retention ·Metastatic prostate ca ·Hypertension [employer] ·hypokalemia Plan:  
·Change ivf to .45 nacl ·Bmp in am  
·kcl 20 meq ·Hold lasix ·Anemia management per hem onc. ·Urology input noted.  
   
  
Orders:  
Telemetry/ IP.  Spot check oximetry.  Contniuous VS.  I and O.  rogers.  Bladder checks.  Ambulate with assist.  Daily weights.  Rogers. Fall precautions.  Full code.   CBC/BMP in am.    
  
Humalog SSI.  Casodex 50 mg po daily.  PO: Lipitor, Toprol XL.  Heparin 5,000 units SQ Q 8 hours.  Hydralazine 37.5 mg po Q 6 prn for SBP > 160, DBP > 100.  KCl 40 mEq po x 1.   
  
   
Urologic Disease GRG - Care Day 4 (5/20/2019) by Jeanette Mosher RN  
 
   
Review Entered Review Status 5/21/2019 09:25 Completed  
   
Criteria Review Care Day: 4 Care Date: 5/20/2019 Level of Care: Inpatient Floor Guideline Day 2 Level Of Care (X) Floor 5/21/2019 09:25:09 EDT by Jeanette Mosher Telemetry/ IP. Clinical Status  
(X) * No ICU or intermediate care needs 5/21/2019 09:25:09 EDT by Jeanette Mosher Pt on telemetry. Interventions  
(X) Transition to oral routes 5/21/2019 09:25:09 EDT by Jeanette Mosher PO Meds. * Milestone Additional Notes 5/19/19-  
  
VS: 98- P- 66- R- 20- 142/70. O2 sat= 98% on r/a. Abnormal Labs:  
Chl= 111.  BUN= 30.  Cr= 3.45.  BUN/Cr ratio= 9.  GFR= 22.  Ca= 8.2.  ALT= 10.  DMZ=9=984.  T. protein= 6.3.  Albumin= 2.4.  RBC= 2.58. HGb= 7.8. HCt= 24.1.  RDW= 16.3.  Plt= 138.  Imm. Grans= 2.  Abs. Imm. Grans= 0.1. CXR=  CHF. Coiled or kinked central line as described. Attending Note: JANN    
Nephrology has been consculted and he has obstruction as cause of her Stoney Hamman and has been starte d on NS , changed to half NS Bmp in am   
   
Prostate cancer CT shows Extensive sherrie and osseous metastases likely reflective of metastatic  
prostate neoplasm. Continue casodex   
   
   
HERNANDEZ with hydronephrosis Carmen Luu Bilateral hydronephrosis likely reflecting urinary bladder outlet obstruction  
given suspected prostate cancer projection into the bladder base Urology couldot place rogers in him May need nephrostomy ?  
   
Anemia   
7 .8 Stool studies , iron studies    
  
Occluded IVC filter with collaterals   
   
Dislocation of left hip arthoplasty Consult  Ortho   
   
History of heart failure Type unknown ? He is poor historian Heme-Onc. Note:  
Exam:  
General: Alert, cooperative, no distress, appears stated age. Head: Normocephalic, without obvious abnormality, atraumatic. Eyes: Conjunctivae/corneas clear. PERRL, EOMs intact. Throat:Lips, mucosa, and tongue normal.   
Neck: Supple, symmetrical, trachea midline, no adenopathy Back:  Symmetric, no curvature. ROM normal. No CVA tenderness. Lungs:  Clear to auscultation bilaterally. Heart: Regular rate and rhythm, S1, S2 normal  
Abdomen:Soft, non-tender. Bowel sounds normal. No masses,  No organomegaly. Extremities: Extremities normal, atraumatic, no cyanosis Skin: Skin color, texture, turgor normal. No rashes or lesions. Lymph nodes:Cervical, supraclavicular, and axillary nodes normal.  
Neurologic: CNII-XII intact. Assessment and Recommendations: Grzegorz Mao is a  61 y.o. year old seen in consultation at the request of Dr. Brooke Meza for metastatic prostate cancer, anemia and thrombocytopenia Plan: 1.  Go ahead and continue with Casodex 50 mg a day. 2.  He will be starting on Lupron on the 28th.   
3.  The anemia is probably secondary to chronic renal dysfunction in addition to bone marrow replacement from his prostate cancer.  We will check some blood work.  The patient will probably need to start on erythropoietin shots 4.  Thrombocytopenia is most likely secondary to Laveen. 5.  Need to continue to work on weight reduction. 6.  We will also need to get him on Xgeva to help with his bone metastasis. 7.  We will plan on seeing him in the office after discharge to take care of the hemoglobin problem and we will get him on Xgeva if the urologist have not Urology Note: Afeb WBC   4.4 Cr  Down slightly to 3.4 Voiding well No pain  
Continue casodex. Await possible degarelix availability here. Hoping to avoid perc neph. Nephrology Note:  
Deonte Cousin due to post renal obstruction- other possible etiology dehydration with lasix use ·ckd ? baseline ·B/l hydronephrosis due to adenopathy - voiding well. PVR normal  
·Urinary retention ·Metastatic prostate ca ·Hypertension Raissa Frank ·hypokalemia Plan:  
·Change ivf to .45 nacl ·Bmp in am  
·kcl 20 meq ·Hold lasix ·Anemia management per hem onc. ·Urology input noted. Orders:  
Telemetry/ IP.  Spot check oximetry.  Contniuous VS.  I and Valjean Lusher.  Bladder checks.  Ambulate with assist.  Daily weights.  Quiros. Fall precautions.  Full code.   CBC/BMP in am.    
  
1/2 NS at 75 ml/hr.  Humalog SSI.  Casodex 50 mg po daily.  PO: Lipitor, Toprol XL.  Heparin 5,000 units SQ Q 8 hours.  Hydralazine 37.5 mg po Q 6 prn for SBP > 160, DBP > 100.  KCl 40 mEq po x 1.   
  
   
Urologic Disease GRG - Care Day 2 (5/18/2019) by Swapnil Rachel RN  
 
   
Review Entered Review Status 5/20/2019 14:06 Completed  
   
Criteria Review Care Day: 2 Care Date: 5/18/2019 Level of Care: Inpatient Floor Guideline Day 2 Level Of Care (X) Floor 5/20/2019 13:59:42 EDT by Cherylin Homans   
medical [facility] Clinical Status ( ) * No ICU or intermediate care needs 5/20/2019 13:59:42 EDT by Cherylin Homans Bilateral hydronephrosis Extensive sherrie and osseous metastases likely reflective of metastatic prostate neoplasm Interventions (X) Inpatient interventions continue 5/20/2019 13:59:42 EDT by Lorrie Bruner Lipitor 40 mg po qhs, Casodex 50 mg po qd, Dextrose 12.5g IV prn x1, Heparin 5000 units q8 sc, Toprol xl 100 mg po qd, D5 NS 75 hr   
  
(X) Transition to oral routes 5/20/2019 13:59:42 EDT by Lorrie Bruner Regular diet * Milestone Additional Notes 5/18/19 Nephro consult:   
ASSESSMENT:  
· JANN LIKELY due to post renal obstruction- other possible etiology dehydration with lasix use · ckd ? baseline · B/l hydronephrosis · Urinary retention · Metastatic prostate ca · Hypertension · Hyperlipidemia · Anemia  
   
   
   
   
· Principal Problem: ·  Acute kidney injury superimposed on chronic kidney disease ([facility] (5/17/2019) ·    
· Active Problems: ·  Hydroureteronephrosis (5/17/2019) ·    
·  Anemia (5/17/2019) ·    
·  Prostate cancer metastatic to multiple sites [facility]) (5/17/2019) ·    
·   PLAN:  
· Continue ivf · Place rogers cath · Check bmp in am  
· Hold lasix · If cr. Remains high with hydration and ivf. We will order serologies tomorrow · Urology input noted  
   
 High complexity decision making was performed  
 Patient is at high-risk of decompensation with multiple organ involvement  
   
Subjective: HPI: Sharyn Coffey is a 62 y.o.  male. he was sent to er due to abnormal labs He is found to have arf with cr. 3.78 and b/l hydronephrosis on CT ABDO. No old cr. Available He has h/o hypertension. He is on lasix and metoprolol Nurse tried to place rogers this am but she was not successful He denies h/o DM, RENAL STONE, CAD No recent antibiotics use No hypotension since admission No h/o NSAIDS use recently No recent iv contrast exposure Patient denies any history of hematuria, proteinuria. No history of nephrolithiasis in the past.   
no history of pyelonephritis.    
No history of transfusion - no history of hepatitis or jaundice.    
No h/o  herbal supplements use  
   
No c/o sob, No c/o chest pain, No c/o nausea or vomiting No c/o  fever. No c/o dysuria Vitals: 98.6 72 18 98% 189/112 Labs: Hgb 7.8 Plt 137 K 3.4 BUN 31 Crit 3.77 CA 8.3 Urine: trace protein trace leuko Chest xray: CHF Coiled or kinked central line as described UROLOGY  
   
   
Afeb  
   
Cr   3.7 WBC   4.8 Hgb  7.8  
   
Note by Dr Ricardo Gibson reviewed. Richard Padgett staff unable to place 16 fr couide' rogers. I tried 18 fr coude' and 14 fr coude'---resistance at prostate.  
   
He voided with good stream aaound rogers during rogers attempt.  
   
PVR bladder scan  171cc.  
   
Assess:  Metastatic pros ca.  
   
Will leave rogers out as he is voiding with reasonable PVR.  Hydro likely related to adenopathy.  
   
On Casodex to start hormonal rx. Dairl Delay he is to be in hospital longer than short term we may investigate availability of Firmagon dosing while here. l  
  Hematology Oncology Consultation  
   
Reason for consult: Prostate cancer, anemia, thrombocytopenia, Genell Seabrook  
   
Admitting Diagnosis: Prostate cancer metastatic to multiple sites ([facility] Aparna Fees; Acute kidney injury superimposed on chronic kidney disease ([address] [N17.9, N18.9]; Hydroureteronephrosis [N13.30]; Anemia [D64.9]  
   
HPI: Ressie Runner is a 62 y.o. male who I was asked to see in consultation at the request of Dr. Rossana Ramos for evaluation for prostate cancer.  Pt was admitted on 5/17/2019  
   
   
History: The patient is a very pleasant 57-year-old gentleman who has been doing well he went for routine checkup with his family doctor at which time his PSA was found to be elevated he was then sent to a urologist a CT and bone scans were ordered which showed widespread metastatic disease involving his abdomen and his lymph nodes as well as bones he also had bladder outlet obstruction with an elevated creatinine for that reason was admitted. Bob Dick had a little difficulty with bladder drainage in the urology department is following him with that particular problem he was started on Casodex 50 mg a day and is continuing with that drug to help given anti-androgen effect.  He is due to started on Lupron on the 28th of this month.  The patient really denies any symptoms related to his prostate is not having any bone issues nor is he having any problems with voiding up until the present time.    
   
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  
Juno Arciniega has had some problems with his breathing and presently uses a CPAP.  He also has problems with obesity.  And is presently disabled due to an injury that happened to go Assessment and Recommendations: Sal Bond is a  61 y.o. year old seen in consultation at the request of Dr. Jessie Rivera for metastatic prostate cancer, anemia and thrombocytopenia  
   
Plan: 1.  Go ahead and continue with Casodex 50 mg a day.  
   
2.  He will be starting on Lupron on the 28th.  
   
3.  The anemia is probably secondary to chronic renal dysfunction in addition to bone marrow replacement from his prostate cancer.  We will check some blood work.  
   
4.  Thrombocytopenia is most likely secondary to Laveen.  
   
5.  Need to continue to work on weight reduction.  
   
   
  
Nephro: Urology input noted Post void bladder scan 171 As per urology. Wainwright likely related to adenopathy. We will follow cr. If it get worse. We will d/w UROLOGY for benefit of percut nephrostomy tube placement.  
   
  
Internal med: Dr Clarence Wright saw patient this morning   
   
   
Patient with prostate CA and HERNANDEZ and JANN from it , [facility] patient Nephrology and urology on board If JANN worsens , as we couldn't get rogers placed , he will need nephrostomy

## 2019-05-21 NOTE — PROGRESS NOTES
Creatinine continues to drop on casodex rx. [id number] Cathleen Holt for [employer]. No stent or rogers needed at this time. If creat increases in future, may need drainage.   Thanks

## 2019-05-21 NOTE — PROGRESS NOTES
Instructions for discharge given. Asked patient if he had any questions or concerns about dc papers.  He said no he did not and wants to go home now

## 2019-05-21 NOTE — ROUTINE PROCESS
Bedside and Verbal shift change report given to Maricruz (oncoming nurse) by Amelia Ernst (offgoing nurse). Report included the following information SBAR, Kardex, Intake/Output, MAR, Accordion and Recent Results.

## 2019-05-21 NOTE — DISCHARGE INSTRUCTIONS
Discharge Instructions       PATIENT ID: Carl Pena  MRN: [id number]   YOB: 1965    DATE OF ADMISSION: 5/17/2019  2:45 PM    DATE OF DISCHARGE: 5/21/2019    PRIMARY CARE PROVIDER: Armond Fuentes MD       DISCHARGING PHYSICIAN: Justin Blankenship MD    To contact this individual call [phone] and ask the  to page. If unavailable ask to be transferred the Adult Hospitalist Department. DISCHARGE DIAGNOSES Acute kidney injury    CONSULTATIONS: IP CONSULT TO HOSPITALIST  IP CONSULT TO UROLOGY  IP CONSULT TO HEMATOLOGY  IP CONSULT TO NEPHROLOGY  IP CONSULT TO ORTHOPEDIC SURGERY    PROCEDURES/SURGERIES: * No surgery found *    PENDING TEST RESULTS:   At the time of discharge the following test results are still pending: NA    FOLLOW UP APPOINTMENTS:   Follow-up Information     Follow up With Specialties Details Why Contact Info    Armond Fuentes MD Internal Medicine   [address] [facility]  [phone]             ADDITIONAL CARE RECOMMENDATIONS:   Please follow up with your primary care provider in 1 to 2 weeks. Please follow up with Nephrologist in 2 weeks. Please repeat blood test called CBC and CMP in week prior to visit to your PCP and nephrologist.       DIET: Cardiac Diet and Renal Diet    ACTIVITY: Activity as tolerated    EQUIPMENT needed: He uses crutches at home. DISCHARGE MEDICATIONS:   See Medication Reconciliation Form    · It is important that you take the medication exactly as they are prescribed. · Keep your medication in the bottles provided by the pharmacist and keep a list of the medication names, dosages, and times to be taken in your wallet. · Do not take other medications without consulting your doctor. NOTIFY YOUR PHYSICIAN FOR ANY OF THE FOLLOWING:   Fever over 101 degrees for 24 hours.    Chest pain, shortness of breath, fever, chills, nausea, vomiting, diarrhea, change in mentation, falling, weakness, bleeding. Severe pain or pain not relieved by medications. Or, any other signs or symptoms that you may have questions about. DISPOSITION:   X Home With:   OT  PT  HH  RN       SNF/Inpatient Rehab/LTAC    Independent/assisted living    Hospice    Other:     CDMP Checked:    Yes X       Signed:   Sancho Malone MD  5/21/2019  10:09 AM

## 2019-05-21 NOTE — DISCHARGE SUMMARY
Discharge Summary       PATIENT ID: Irma Ortega  MRN: [id number]   YOB: 1965    DATE OF ADMISSION: 5/17/2019  2:45 PM    DATE OF DISCHARGE:   PRIMARY CARE PROVIDER: Abida Rivera MD       DISCHARGING PHYSICIAN: Aylin Marquez MD    To contact this individual call [phone] and ask the  to page. If unavailable ask to be transferred the Adult Hospitalist Department. CONSULTATIONS: IP CONSULT TO HOSPITALIST  IP CONSULT TO UROLOGY  IP CONSULT TO HEMATOLOGY  IP CONSULT TO NEPHROLOGY  IP CONSULT TO ORTHOPEDIC SURGERY    PROCEDURES/SURGERIES: * No surgery found *    [id number] [facility] COURSE:   Patient was referred from CT for IV hydration. PCP called patient while he was still in radiology and advised him that his kidney function was elevated and needed to be seen.      DISCHARGE DIAGNOSES / PLAN:      Hypertension:   BP is uncontrolled. Will start with hydralazine as PRN and continue to monitor with Metoprolol. Patient needs follow up with PCP and nephrologist.      JANN    Improving. Nephrology has been consulted. IVF discontinued on 05/20. Patient needs follow up with nephrologist as an outpatient.      Prostate cancer   CT shows Extensive sherrie and osseous metastases likely reflective of metastatic  prostate neoplasm. Continue casodex and he is getting started on lupron but as outopatient   [address] carry      HERNANDEZ with hydronephrosis   Bilateral hydronephrosis likely reflecting urinary bladder outlet obstruction  given suspected prostate cancer projection into the bladder base. Patient has good urine output now and patient said he does not have any problem urinating now. Urology couldot place rogers in him   Appreciate urology input.      Anemia   7 .8  Stool studies , iron studies    likley from cancer and kidney dysfn.        Occluded IVC filter with collaterals      Dislocation of left hip arthoplasty   Appreciate Ortho input.  Patient was suggested for follow up at [facility]. No intervention needed now for known bilateral hip dislocation. PT/OT suggested. He has it for many years and asymtomatic      History of heart failure   Type unknown ? He is poor historian     See orders for other plans. VTE prophylaxis: SCD  Code status: Full  Discussed plan of care with Patient/Family and Nurse. Pre-admission lived at home. Discharge planning: Discharge home today with close follow up with his PCP, nephrologist. Patient understands he might get bladder obstruction again due to bladder obstruction again. 1400: I spoke with patient PCP, Thomas Escobar, Dr Sonny Saenz. They will have follow up with him tomorrow. 1600: As per oncologist, patient needs:   Go ahead and continue with Casodex 50 mg a day. He will be starting on [facility] antagonist on the 28th. \  The anemia is probably secondary to chronic renal dysfunction in addition to bone marrow replacement from his prostate cancer. We will check some blood work. The patient will probably need to start on erythropoietin shots   We will also need to get him on Xgeva to help with his bone metastasis. We will plan on seeing him in the office after discharge to take care of the hemoglobin problem and we will get him on Xgeva if the urologist have not. .   Will check CBC today. PENDING TEST RESULTS:   At the time of discharge the following test results are still pending: NA    FOLLOW UP APPOINTMENTS:    Follow-up Information     Follow up With Specialties Details Why Contact Info    Susie Guerrero MD Internal Medicine   [address] [facility]  [phone]                   DISCHARGE MEDICATIONS:  Current Discharge Medication List      CONTINUE these medications which have NOT CHANGED    Details   albuterol (PROVENTIL VENTOLIN) 2.5 mg /3 mL (0.083 %) nebulizer solution 2.5 mg by Nebulization route every four (4) hours as needed.       VENTOLIN HFA 90 mcg/actuation inhaler Take 1 Puff by inhalation every four (4) hours as needed. atorvastatin (LIPITOR) 40 mg tablet Take 40 mg by mouth daily. fluticasone propionate (FLONASE) 50 mcg/actuation nasal spray 2 Sprays by Both Nostrils route daily. ipratropium (ATROVENT) 0.02 % soln 0.5 mg every four (4) hours as needed. metoprolol succinate (TOPROL-XL) 100 mg tablet TK 1 T PO QD  Refills: 0      bicalutamide (CASODEX) 50 mg tablet Take 50 mg by mouth daily. STOP taking these medications       furosemide (LASIX) 20 mg tablet Comments:   Reason for Stopping:         furosemide (LASIX) 40 mg tablet Comments:   Reason for Stopping:                 NOTIFY YOUR PHYSICIAN FOR ANY OF THE FOLLOWING:   Fever over 101 degrees for 24 hours. Chest pain, shortness of breath, fever, chills, nausea, vomiting, diarrhea, change in mentation, falling, weakness, bleeding. Severe pain or pain not relieved by medications. Or, any other signs or symptoms that you may have questions about. DISPOSITION:    Home With:   OT  PT  HH  RN       Long term SNF/Inpatient Rehab    Independent/assisted living    Hospice    Other:       PATIENT CONDITION AT DISCHARGE:     Functional status    Poor     Deconditioned     Independent      Cognition     Lucid     Forgetful     Dementia      Catheters/lines (plus indication)    Quiros     PICC     PEG     None      Code status     Full code     DNR      PHYSICAL EXAMINATION AT DISCHARGE:   Refer to Progress Note.       CHRONIC MEDICAL DIAGNOSES:  Problem List as of 5/21/2019 Never Reviewed          Codes Class Noted - Resolved    ELI (nonalcoholic steatohepatitis) ICD-10-CM: K75.81  ICD-9-CM: 571.8  5/18/2019 - Present        Morbid [facility] ([facility] ICD-10-CM: E66.01  ICD-9-CM: 278.01  5/18/2019 - Present        Atrial fibrillation ([facility] ICD-10-CM: I48.91  ICD-9-CM: 427.31  Unknown - Present        Hydroureteronephrosis ICD-10-CM: N13.30  ICD-9-CM: 777  5/17/2019 - Present        Anemia ICD-10-CM: D64.9  ICD-9-CM: 285.9  5/17/2019 - Present        Prostate cancer metastatic to multiple sites [facility]) ICD-10-CM: C61  ICD-9-CM: 185, 199.0  5/17/2019 - Present        * (Principal) Acute kidney injury superimposed on chronic kidney disease ([facility] ICD-10-CM: N17.9, N18.9  ICD-9-CM: 866.00, 585.9  5/17/2019 - Present              Greater than 35 minutes were spent with the patient on counseling and coordination of care    Signed:   Venkat Vicente MD  5/21/2019  10:11 AM

## 2019-05-21 NOTE — PROGRESS NOTES
Nephrology Progress Note  Rosamaria Howard  Date of Admission : 5/17/2019    CC:  Follow up for JANN       Assessment and Plan     JANN:  - likely 2/2 obstruction  - stable and improving  - ok for d/c  - will need outpt f/u with us and urology     B/l hydronephrosis w/ urinary retention:  - voiding well   - per urology    Metastatic prostate cancer:  - on casodex  - to start Lupron this month    HTN:  - cont metoprolol    Anemia:  - hgb stable  - per hematology    DM2:  - per primary team       Interval History:  Seen and examined. Voiding well. Cr continues to trend down. No cp, sob, n/v/d reported at this time. Current Medications: all current  Medications have been eviewed in EPIC  Review of Systems: Pertinent items are noted in HPI. Objective:  Vitals:    Vitals:    05/21/19 0631 05/21/19 0810 05/21/19 0819 05/21/19 0938   BP: 142/87 (!) 156/106 (!) 162/103 142/88   Pulse:  66 65 71   Resp:  18     Temp:       SpO2:  98%     Weight:       Height:         Intake and Output:  No intake/output data recorded. 05/19 1901 - 05/21 0700  In: 480 [P.O.:480]  Out: 6425 [Urine:6425]    Physical Examination:  General: NAD,Conversant   Neck:  Supple, no mass  Resp:  Lungs CTA B/L, no wheezing , normal respiratory effort  CV:  RRR,  no murmur or rub, chronic LE edema  GI:  Soft, NT, + Bowel sounds, no hepatosplenomegaly  Neurologic:  Non focal  Psych:             AAO x 3 appropriate affect   Skin:  No Rash  :  No rogers    []    High complexity decision making was performed  []    Patient is at high-risk of decompensation with multiple organ involvement    Lab Data Personally Reviewed: I have reviewed all the pertinent labs, microbiology data and radiology studies during assessment.     Recent Labs     05/21/19  0252 05/20/19  0430 05/19/19  0500    141 142   K 4.0 3.4* 3.5   * 109* 111*   CO2 24 22 21   * 89 98   BUN 29* 27* 30*   CREA 2.76* 3.02* 3.45*   CA 8.2* 8.0* 8.2*   ALB  --   --  2.4* SGOT  --   --  23   ALT  --   --  10*     Recent Labs     05/19/19  0500   WBC 4.4   HGB 7.8*   HCT 24.1*   *     No results found for: SDES  No results found for: CULT  Recent Results (from the past 24 hour(s))   GLUCOSE, POC    Collection Time: 05/20/19 11:40 AM   Result Value Ref Range    Glucose (POC) 166 (H) 65 - 100 mg/dL    Performed by Abdulkadir Villasenor, POC    Collection Time: 05/20/19  4:52 PM   Result Value Ref Range    Glucose (POC) 115 (H) 65 - 100 mg/dL    Performed by Alissa [place]    GLUCOSE, POC    Collection Time: 05/20/19  9:15 PM   Result Value Ref Range    Glucose (POC) 110 (H) 65 - 100 mg/dL    Performed by Edi Mead    METABOLIC PANEL, BASIC    Collection Time: 05/21/19  2:52 AM   Result Value Ref Range    Sodium 142 136 - 145 mmol/L    Potassium 4.0 3.5 - 5.1 mmol/L    Chloride 111 (H) 97 - 108 mmol/L    CO2 24 21 - 32 mmol/L    Anion gap 7 5 - 15 mmol/L    Glucose 101 (H) 65 - 100 mg/dL    BUN 29 (H) 6 - 20 MG/DL    Creatinine 2.76 (H) 0.70 - 1.30 MG/DL    BUN/Creatinine ratio 11 (L) 12 - 20      GFR est AA 29 (L) >60 ml/min/1.73m2    GFR est non-AA 24 (L) >60 ml/min/1.73m2    Calcium 8.2 (L) 8.5 - 10.1 MG/DL   GLUCOSE, POC    Collection Time: 05/21/19  6:07 AM   Result Value Ref Range    Glucose (POC) 106 (H) 65 - 100 mg/dL    Performed by Deborah Stokes,[address], STOOL    Collection Time: 05/21/19  6:24 AM   Result Value Ref Range    Occult blood, stool NEGATIVE  NEG     GLUCOSE, POC    Collection Time: 05/21/19 11:08 AM   Result Value Ref Range    Glucose (POC) 111 (H) 65 - 100 mg/dL    Performed by Jerrell Marquez MD  [address] [place] [address], [facility]  Phone - [phone]   Fax - [phone]  [email or web address]

## 2019-05-21 NOTE — PROGRESS NOTES
Hospitalist Progress Note          Javid Bernardo MD  Please call  and page for questions. Call physician on-call through the  7pm-7am    Daily Progress Note: 5/21/2019    Primary care provider:Abdifatah Dennis MD    Date of admission: 5/17/2019  2:45 PM    Admission summery and hospital course:  Patient was referred from CT for IV hydration. PCP called patient while he was still in radiology and advised him that his kidney function was elevated and needed to be seen. Subjective:   Patient said he is feeling better and he is willing to go home. Patient denied any acute issue. Assessment/Plan:   Hypertension:   BP is uncontrolled. Will start with hydralazine as PRN and continue to monitor with Metoprolol. Patient needs follow up with PCP and      JANN    Improving. Nephrology has been consulted. IVF discontinued on 05/20. Patient needs follow up with nephrologist as an outpatient. Prostate cancer   CT shows Extensive sherrie and osseous metastases likely reflective of metastatic  prostate neoplasm. Continue casodex and he is getting started on lupron but as outopatient   [address]      HERNANDEZ with hydronephrosis   Bilateral hydronephrosis likely reflecting urinary bladder outlet obstruction  given suspected prostate cancer projection into the bladder base. Patient has good urine output now and patient said he does not have any problem urinating now. Urology couldot place rogers in him   Appreciate urology input.      Anemia   7 .8  Stool studies , iron studies    likley from cancer and kidney dysfn.        Occluded IVC filter with collaterals      Dislocation of left hip arthoplasty   Appreciate Ortho input. [place] was [place] [facility] No intervention needed now for known bilateral hip dislocation. PT/OT suggested. He has it for many years and asymtomatic      History of heart failure   Type unknown ?   He is poor historian    See orders for other plans. VTE prophylaxis: SCD  Code status: Full  Discussed plan of care with Patient/Family and Nurse. Pre-admission lived at home. Discharge planning: Discharge home today with close follow up with his PCP, nephrologist.        Review of Systems:     Review of Systems:  Symptom  Y/N  Comments   Symptom  Y/N  Comments    Fever/Chills  n    Chest Pain  n    Poor Appetite  n    Edema  y     Cough  n   Abdominal Pain  nn     Sputum  n   Joint Pain      SOB/HICKS  n   Pruritis/Rash      Nausea/vomit  n   Tolerating PT/OT      Diarrhea  n   Tolerating Diet      Constipation     Other      Could not obtain due to:         Objective:   Physical Exam:     Visit Vitals  BP (!) 162/103 (BP 1 Location: Left arm, BP Patient Position: At rest) Comment: nurse notififed   Pulse 65   Temp 98.1 °F (36.7 °C)   Resp 18   Ht 5' 9\" (1.753 m)   Wt (!) 173.5 kg (382 lb 9.6 oz)   SpO2 98%   BMI 56.50 kg/m²    O2 Flow Rate (L/min): 2 l/min O2 Device: Nasal cannula    Temp (24hrs), Av.1 °F (36.7 °C), Min:97.9 °F (36.6 °C), Max:98.3 °F (36.8 °C)    No intake/output data recorded.  1901 -  0700  In: 18 [P.O.:480]  Out: 6425 [Urine:6425]      General:  Alert, obese. Cooperative, no distress, appears stated age. Lungs:   Clear to auscultation bilaterally. Heart:  Regular rate and rhythm, S1, S2 normal, no murmur, click, rub or gallop. Abdomen:   Soft, obese,  non-tender. Bowel sounds normal. No masses,  No organomegaly. Extremities: Extremities normal, atraumatic, no cyanosis. Edema at lower extremities. Skin: Skin color, texture, turgor normal. No rashes or lesions   Neurologic: CNII-XII intact. Patient has clear voice and follows command well.       Data Review:       Recent Days:  Recent Labs     19  0500   WBC 4.4   HGB 7.8*   HCT 24.1*   *     Recent Labs     19  0252 19  0430 05/19/19  0500    141 142   K 4.0 3.4* 3.5   * 109* 111*   CO2 24 22 21   * 89 98   BUN 29* 27* 30*   CREA 2.76* 3.02* 3.45*   CA 8.2* 8.0* 8.2*   ALB  --   --  2.4*   SGOT  --   --  23   ALT  --   --  10*     No results for input(s): PH, PCO2, PO2, HCO3, FIO2 in the last 72 hours.     24 Hour Results:  Recent Results (from the past 24 hour(s))   GLUCOSE, POC    Collection Time: 05/20/19 11:40 AM   Result Value Ref Range    Glucose (POC) 166 (H) 65 - 100 mg/dL    Performed by Abdulkadir Villasenor, POC    Collection Time: 05/20/19  4:52 PM   Result Value Ref Range    Glucose (POC) 115 (H) 65 - 100 mg/dL    Performed by Trace Mauricio    GLUCOSE, POC    Collection Time: 05/20/19  9:15 PM   Result Value Ref Range    Glucose (POC) 110 (H) 65 - 100 mg/dL    Performed by Stepan Wotoen    METABOLIC PANEL, BASIC    Collection Time: 05/21/19  2:52 AM   Result Value Ref Range    Sodium 142 136 - 145 mmol/L    Potassium 4.0 3.5 - 5.1 mmol/L    Chloride 111 (H) 97 - 108 mmol/L    CO2 24 21 - 32 mmol/L    Anion gap 7 5 - 15 mmol/L    Glucose 101 (H) 65 - 100 mg/dL    BUN 29 (H) 6 - 20 MG/DL    Creatinine 2.76 (H) 0.70 - 1.30 MG/DL    BUN/Creatinine ratio 11 (L) 12 - 20      GFR est AA 29 (L) >60 ml/min/1.73m2    GFR est non-AA 24 (L) >60 ml/min/1.73m2    Calcium 8.2 (L) 8.5 - 10.1 MG/DL   GLUCOSE, POC    Collection Time: 05/21/19  6:07 AM   Result Value Ref Range    Glucose (POC) 106 (H) 65 - 100 mg/dL    Performed by Aura Vogel        Problem List:  Problem List as of 5/21/2019 Never Reviewed          Codes Class Noted - Resolved    ELI (nonalcoholic steatohepatitis) ICD-10-CM: K75.81  ICD-9-CM: 571.8  5/18/2019 - Present        Morbid obesity ([facility] ICD-10-CM: E66.01  ICD-9-CM: 278.01  5/18/2019 - Present        Atrial fibrillation ([facility] ICD-10-CM: I48.91  ICD-9-CM: 427.31  Unknown - Present        Hydroureteronephrosis ICD-10-CM: N13.30  ICD-9-CM: 258  5/17/2019 - Present        Anemia ICD-10-CM: D64.9  ICD-9-CM: 285.9  5/17/2019 - Present        Prostate cancer metastatic to multiple sites [facility]) ICD-10-CM: C61  ICD-9-CM: 185, 199.0  5/17/2019 - Present        * (Principal) Acute kidney injury superimposed on chronic kidney disease ([facility] ICD-10-CM: N17.9, N18.9  ICD-9-CM: 866.00, 585.9  5/17/2019 - Present              Medications reviewed  Current Facility-Administered Medications   Medication Dose Route Frequency    atorvastatin (LIPITOR) tablet 40 mg  40 mg Oral QHS    metoprolol succinate (TOPROL-XL) XL tablet 100 mg  100 mg Oral DAILY    sodium chloride (NS) flush 5-40 mL  5-40 mL IntraVENous Q8H    sodium chloride (NS) flush 5-40 mL  5-40 mL IntraVENous PRN    heparin (porcine) injection 5,000 Units  5,000 Units SubCUTAneous Q8H    bicalutamide (CASODEX) tablet 50 mg  50 mg Oral DAILY    glucose chewable tablet 16 g  4 Tab Oral PRN    dextrose (D50W) injection syrg 12.5-25 g  25-50 mL IntraVENous PRN    glucagon (GLUCAGEN) injection 1 mg  1 mg IntraMUSCular PRN    insulin lispro (HUMALOG) injection   SubCUTAneous AC&HS       Care Plan discussed with: Patient/Family, Nurse and     Total time spent with patient: 35 minutes.     Denisse Peng MD

## 2019-05-21 NOTE — PROGRESS NOTES
[facility]     Hematology Oncology Consultation    Reason for consult: Prostate cancer, anemia, thrombocytopenia, Anh Anderson    Admitting Diagnosis: Prostate cancer metastatic to multiple sites ([address] Samson Patella; Acute kidney injury superimposed on chronic kidney disease ([address] [N17.9, N18.9]; Hydroureteronephrosis [N13.30]; Anemia [D64.9]    HPI: Maida Garcia is a 62 y.o. male who I was asked to see in consultation at the request of Dr. Jasmin Villafana for evaluation for prostate cancer. Pt was admitted on 5/17/2019      History: The patient is a very pleasant 57-year-old gentleman who has been doing well he went for routine checkup with his family doctor at which time his PSA was found to be elevated he was then sent to a urologist a CT and bone scans were ordered which showed widespread metastatic disease involving his abdomen and his lymph nodes as well as bones he also had bladder outlet obstruction with an elevated creatinine for that reason was admitted. He had a little difficulty with bladder drainage so the urology department is following him with that particular problem. He was started on Casodex 50 mg a day and is continuing with that drug to help given anti-androgen effect. He is due to started on Lupron on the 28th of this month. The patient really denies any symptoms related to his prostate is not having any bone issues nor is he having any problems with voiding up until the present time. 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 He has had some problems with his breathing and presently uses a CPAP. He also has problems with obesity. And is presently disabled due to an injury that happened long ago    5/20/2019 the patient seems to continue to be improving. His creatinine is down to slightly over 3 with a creatinine clearance of about 22.   His erythropoietin level is pending. His haptoglobin and LDH are normal.  His iron studies are normal.  His B12 and folate are normal.  The patient will need erythropoietin shots to keep his hemoglobin up. Plan on helping him with that as an outpatient. 5/21/2019 patient continues to improve his creatinine continues to come down. We will order a CBC for today. Last hemoglobin was 7.8  Past Medical History:   Diagnosis Date    Atrial fibrillation ([facility]     CHF (congestive heart failure) (HCC)     Chronic obstructive pulmonary disease ([facility]     Diabetes ([facility]     pre-diabetic on metformin    Hypertension     Prostate enlargement       Past Surgical History:   Procedure Laterality Date    HX HERNIA REPAIR      ventral and inguinal    HX ORTHOPAEDIC      hip surgery      Prior to Admission medications    Medication Sig Start Date End Date Taking? Authorizing Provider   albuterol (PROVENTIL VENTOLIN) 2.5 mg /3 mL (0.083 %) nebulizer solution 2.5 mg by Nebulization route every four (4) hours as needed. 5/10/19   Melba Winter MD   VENTOLIN HFA 90 mcg/actuation inhaler Take 1 Puff by inhalation every four (4) hours as needed. 5/10/19   Melba Winter MD   atorvastatin (LIPITOR) 40 mg tablet Take 40 mg by mouth daily. 5/10/19   Melba Winter MD   fluticasone propionate (FLONASE) 50 mcg/actuation nasal spray 2 Sprays by Both Nostrils route daily. 5/10/19   Melba Winter MD   furosemide (LASIX) 20 mg tablet Take 20 mg by mouth daily. 5/10/19   Melba Winter MD   furosemide (LASIX) 40 mg tablet Take 40 mg by mouth daily. 5/10/19   Melba Winter MD   ipratropium (ATROVENT) 0.02 % soln 0.5 mg every four (4) hours as needed. 5/10/19   Melba Winter MD   metoprolol succinate (TOPROL-XL) 100 mg tablet TK 1 T PO QD 5/16/19   Melba Winter MD   bicalutamide (CASODEX) 50 mg tablet Take 50 mg by mouth daily.     Provider, Historical     No Known Allergies   Social History     Tobacco Use    Smoking status: Never Smoker    Smokeless tobacco: Never Used   Substance Use Topics    Alcohol use: Yes     Alcohol/week: 1.8 oz     Types: 3 Cans of beer per week     Frequency: Never     Comment: on weekends only      History reviewed. No pertinent family history. Current Medications:  Current Facility-Administered Medications   Medication Dose Route Frequency    hydrALAZINE (APRESOLINE) tablet 37.5 mg  37.5 mg Oral Q6H PRN    atorvastatin (LIPITOR) tablet 40 mg  40 mg Oral QHS    metoprolol succinate (TOPROL-XL) XL tablet 100 mg  100 mg Oral DAILY    sodium chloride (NS) flush 5-40 mL  5-40 mL IntraVENous Q8H    sodium chloride (NS) flush 5-40 mL  5-40 mL IntraVENous PRN    heparin (porcine) injection 5,000 Units  5,000 Units SubCUTAneous Q8H    bicalutamide (CASODEX) tablet 50 mg  50 mg Oral DAILY    glucose chewable tablet 16 g  4 Tab Oral PRN    dextrose (D50W) injection syrg 12.5-25 g  25-50 mL IntraVENous PRN    glucagon (GLUCAGEN) injection 1 mg  1 mg IntraMUSCular PRN    insulin lispro (HUMALOG) injection   SubCUTAneous AC&HS         Review of Systems: Comprehensive ROS was performed and all systems negative except for HPI. Physical Exam:  Visit Vitals  /88   Pulse 71   Temp 98.1 °F (36.7 °C)   Resp 18   Ht 5' 9\" (1.753 m)   Wt (!) 382 lb 9.6 oz (173.5 kg)   SpO2 98%   BMI 56.50 kg/m²       General:  Alert, cooperative, no distress, appears stated age. Head:  Normocephalic, without obvious abnormality, atraumatic. Eyes:  Conjunctivae/corneas clear. PERRL, EOMs intact. Throat: Lips, mucosa, and tongue normal.    Neck: Supple, symmetrical, trachea midline, no adenopathy   Back:   Symmetric, no curvature. ROM normal. No CVA tenderness. Lungs:   Clear to auscultation bilaterally. Chest wall:     Heart:  Regular rate and rhythm, S1, S2 normal   Abdomen:   Soft, non-tender. Bowel sounds normal. No masses,  No organomegaly.    Extremities: Extremities normal, atraumatic, no cyanosis with 3+ edema   Skin: Skin color, texture, turgor normal. No rashes or lesions. With hyperpigmentation in his legs from the edema   Lymph nodes: Cervical, supraclavicular, and axillary nodes normal.   Neurologic: CNII-XII intact. Imaging:    Chest X-Ray: Pending    CT abdomen pelvis  1. Extensive sherrie and osseous metastases likely reflective of metastatic  prostate neoplasm. 2. Bilateral hydronephrosis likely reflecting urinary bladder outlet obstruction  given suspected prostate cancer projection into the bladder base. 3. IVC filter with extensive superficial abdominal wall collaterals likely  reflects IVC occlusion at the level of filter, but this is not well evaluated on  unenhanced exam.  4. Dislocation of left hip arthroplasty prosthesis and capsular wear with  superior and lateral subluxation of right hip arthroplasty prosthesis.        Labs:    Recent Results (from the past 24 hour(s))   GLUCOSE, POC    Collection Time: 05/20/19 11:40 AM   Result Value Ref Range    Glucose (POC) 166 (H) 65 - 100 mg/dL    Performed by Abdulkadir Villasenor, POC    Collection Time: 05/20/19  4:52 PM   Result Value Ref Range    Glucose (POC) 115 (H) 65 - 100 mg/dL    Performed by Ritika Fernandez, POC    Collection Time: 05/20/19  9:15 PM   Result Value Ref Range    Glucose (POC) 110 (H) 65 - 100 mg/dL    Performed by Arelis Oro    METABOLIC PANEL, BASIC    Collection Time: 05/21/19  2:52 AM   Result Value Ref Range    Sodium 142 136 - 145 mmol/L    Potassium 4.0 3.5 - 5.1 mmol/L    Chloride 111 (H) 97 - 108 mmol/L    CO2 24 21 - 32 mmol/L    Anion gap 7 5 - 15 mmol/L    Glucose 101 (H) 65 - 100 mg/dL    BUN 29 (H) 6 - 20 MG/DL    Creatinine 2.76 (H) 0.70 - 1.30 MG/DL    BUN/Creatinine ratio 11 (L) 12 - 20      GFR est AA 29 (L) >60 ml/min/1.73m2    GFR est non-AA 24 (L) >60 ml/min/1.73m2    Calcium 8.2 (L) 8.5 - 10.1 MG/DL   GLUCOSE, POC    Collection Time: 05/21/19  6:07 AM   Result Value Ref Range    Glucose (POC) 106 (H) 65 - 100 mg/dL    Performed by Troy Buchanan BLOOD, STOOL    Collection Time: 05/21/19  6:24 AM   Result Value Ref Range    Occult blood, stool NEGATIVE  NEG             Assessment and Recommendations: Grzegorz Mao is a  62y.o. year old seen in consultation at the request of Dr. Brooke Meza for metastatic prostate cancer, anemia and thrombocytopenia    Plan: 1. Go ahead and continue with Casodex 50 mg a day. 2.  He will be starting on [facility] antagonist on the 28th. 3.  The anemia is probably secondary to chronic renal dysfunction in addition to bone marrow replacement from his prostate cancer. We will check some blood work. The patient will probably need to start on erythropoietin shots    4. Thrombocytopenia is most likely secondary to [place]. 5.  Need to continue to work on weight reduction. 6.  We will also need to get him on Xgeva to help with his bone metastasis. 7.  We will plan on seeing him in the office after discharge to take care of the hemoglobin problem and we will get him on Xgeva if the urologist have not    8.   We will check hemoglobin today  Daylin Schwartz MD  5/21/2019

## 2019-05-21 NOTE — PROGRESS NOTES
Bedside shift change report given to Marilyn Mohan RN (oncoming nurse) by Mayra Thibodeaux RN (offgoing nurse). Report included the following information SBAR, Kardex, Intake/Output, MAR and Accordion.

## 2019-05-21 NOTE — PROGRESS NOTES
5/21/19 -   CM participated in IDRs on patient, met with patient at bedside, conferenced with Lena Vogel (Janny Douglas: [phone]) and rounded on patient with attending. CM received notice from [facility] that patient's Medicaid lapsed 5/1. Per patient, he was in communication with his DSS SW (Ms. Jerry Gorman: [phone]) in April to discuss needed documentation for Medicaid renewal.  Per patient report, he faxed all requested documentation. CM submitted email for [employer] review, and per Lena Vogel, patient can follow up OP with Rosalie BRODY to determine Medicaid status. Rosalie Frazier has set patient's hospital follow up for Friday,  5/24. CM placed information on AVS.  Patient is having good urine output, so urology re-consult has been cancelled. Patient is ready for discharge. Patient is aware of follow up appointment and that Rosalie Freddie will call patient on 5/23 with confirmed time. No additional CM needs noted ahead of discharge. Disposition includes transport provided by significant other to home, with additional support provided by family and scheduled follow up appointment on 5/25. CRM: Keyla Townsend, MPH, [facility]; Z: [phone]    13:45 -   CM was notified by bedside nursing that per the patient, the patient's PCP conferenced on the patient this morning via phone. Patient stated that PCP indicated to them that he would benefit from remaining in the hospital for another night. CM contacted Lena Vogel (Janny Douglas: [phone]) for clarification on what PCP stated to the patient. Per Janny Douglas, PCP is now traveling out of the country. Janny Douglas provided CM with contact information for [facility] (Dr. Marco A Thomas: [phone]). CM passed this information to attending physician with request to contact the medical director directly to discuss patient's treatment plan and transitions of care plan.   CRM: Keyla Townsend, MPH, [facility]; Z: [phone]    15:00 -   CM was notified by attending physician and Lena Vogel that the patient will discharge today, 5/21, as planned. Tha Barroso will plan to see the patient in office tomorrow, 5/22, to check creatinine. CM to update appointment on AVS.  Patient is cleared for discharge. CRM: Violet Kaplan, MPH, [facility]; Z: [phone]    15:35 -   CM was notified by bedside nursing that patient has expressed concern about discharging today. CM and attending met with patient and had lengthy conversation regarding discharge plan, including follow up with Tha Barroso first thing tomorrow morning, that attending has spoken directly to Tha Barroso team, and that they can provide transport and additional scripts if needed. Patient expressed concern about affording discharge scripts. Attending did medication reconciliation at bedside, and patient confirmed needing three scripts filled. CM received authorization from this week's CM  to obtain financial assistance for prescriptions. CM priced out scripts (Flonase 50 mcg, Ventolin 90 mcg, Toprol  mg) with [facility]. Approved amount of medication assistance is $71.85. CM faxed scripts to pharmacy with request to deliver to bedside. Attending noted oncology request to draw CBC. Nursing has done lab draw; discharge pending final CBC results.   CRM: Violet Kaplan, MPH, [address] [place] [address]; Z: [phone]

## 2019-05-21 NOTE — PROGRESS NOTES
New cbc drawn due to hemmolized specimen earlier. Patient states he is not staying any longer. md paged

## 2019-08-08 ENCOUNTER — OFFICE VISIT (OUTPATIENT)
Dept: ONCOLOGY | Age: 54
End: 2019-08-08

## 2019-08-08 VITALS
DIASTOLIC BLOOD PRESSURE: 73 MMHG | OXYGEN SATURATION: 93 % | BODY MASS INDEX: 45.18 KG/M2 | TEMPERATURE: 98.8 F | RESPIRATION RATE: 18 BRPM | HEART RATE: 60 BPM | HEIGHT: 69 IN | SYSTOLIC BLOOD PRESSURE: 112 MMHG | WEIGHT: 305 LBS

## 2019-08-08 DIAGNOSIS — D63.8 ANEMIA, CHRONIC DISEASE: ICD-10-CM

## 2019-08-08 DIAGNOSIS — I50.22 CHRONIC SYSTOLIC CONGESTIVE HEART FAILURE (HCC): ICD-10-CM

## 2019-08-08 DIAGNOSIS — C61 PROSTATE CANCER METASTATIC TO BONE (HCC): Primary | ICD-10-CM

## 2019-08-08 DIAGNOSIS — E66.01 MORBID OBESITY (HCC): ICD-10-CM

## 2019-08-08 DIAGNOSIS — C79.51 PROSTATE CANCER METASTATIC TO BONE (HCC): Primary | ICD-10-CM

## 2019-08-08 RX ORDER — WARFARIN 4 MG/1
TABLET ORAL
COMMUNITY
Start: 2019-08-01

## 2019-08-08 NOTE — PROGRESS NOTES
Asia Sher is a 62 y.o. male new patient referred by Dr. Mckenzie Files to provider for prostate cancer w/ mets to bones. Patient able to ambulate but uses the w/c for mobility d/t having b/l hip replacement surgery. Patient has a IVC filter and also taking Warfarin; Warfarin managed by pt's PCP  Patient alert and verbal. VS stable. Patient denies pain at this time. Visit Vitals  /73 (BP 1 Location: Left arm, BP Patient Position: Sitting)   Pulse 60   Temp 98.8 °F (37.1 °C) (Oral)   Resp 18   Ht 5' 9\" (1.753 m)   Wt 305 lb (138.3 kg) Comment: weight per pt   SpO2 93%   BMI 45.04 kg/m²       Pain Scale: 0 - No pain/10  Pain Location:        Health Maintenance Review: Patient reminded of \"due or due soon\" health maintenance. I have asked the patient to contact his/her primary care provider (PCP) for follow-up on his/her health maintenance.

## 2019-08-09 ENCOUNTER — DOCUMENTATION ONLY (OUTPATIENT)
Dept: ONCOLOGY | Age: 54
End: 2019-08-09

## 2019-08-09 NOTE — PROGRESS NOTES
[facility]  [address], [address] [facility], [facility] Andres Loo, [address]  [phone]        Diagnosis:      1. Prostate adenocarcinoma with skeletal/retroperitoneal sherrie metastasis              High volume disease     Pre treatment PSA: 615     Treatment:      1. ADT per Dr. Shabnam Young  2. Start Xgeva     Subjective:      Mr. Clay Turner is a very pleasant 53-year-old AA gentleman who was noted to have a significantly elevated PSA. He was referred to Urology and was seen by Dr. Shabnam Young. CT revealed high volume metastatic cancer in the retroperitoneal nodes and bones. He also had hydronephrosis of the right kidney. He underwent stenting. He has been on ADT. PSA is coming down. He has been referred by evaluation of further treatment. He was seen by Dr. Amrita Hsieh at [facility]. It seems it is more convenient for him to be seen at [facility]. He suffers with morbid obesity, b/l hip replacement, chronic congestive heart failure and ELI. He is confined to a wheelchair and is on disability. Past Medical History:   Diagnosis Date    Atrial fibrillation ([address]     CHF (congestive heart failure) (HCC)     Chronic obstructive pulmonary disease (HCC)     Diabetes [facility]     pre-diabetic on metformin    Hypertension     Prostate enlargement       Past Surgical History:   Procedure Laterality Date    HX HERNIA REPAIR      ventral and inguinal    HX ORTHOPAEDIC      hip surgery      Prior to Admission medications    Medication Sig Start Date End Date Taking? Authorizing Provider   warfarin (COUMADIN) 4 mg tablet  8/1/19  Yes Provider, Historical   metoprolol succinate (TOPROL-XL) 100 mg tablet Take 1 Tab by mouth daily.  5/21/19  Yes Amrita Johnson MD   VENTOLIN HFA 90 mcg/actuation inhaler Take 1 Puff by inhalation every four (4) hours as needed for Wheezing or Shortness of Breath. 5/21/19  Yes Samantha Vickers MD   fluticasone propionate (FLONASE) 50 mcg/actuation nasal spray 2 Sprays by Both Nostrils route daily. 5/21/19  Yes Diana Vickers MD   albuterol (PROVENTIL VENTOLIN) 2.5 mg /3 mL (0.083 %) nebulizer solution 2.5 mg by Nebulization route every four (4) hours as needed. 5/10/19  Yes Maggy, MD Melba   atorvastatin (LIPITOR) 40 mg tablet Take 40 mg by mouth daily. 5/10/19  Yes Other, MD Melba   ipratropium (ATROVENT) 0.02 % soln 0.5 mg every four (4) hours as needed. 5/10/19  Yes Other, MD Melba   bicalutamide (CASODEX) 50 mg tablet Take 50 mg by mouth daily. Yes Provider, Historical     No Known Allergies   Social History     Tobacco Use    Smoking status: Never Smoker    Smokeless tobacco: Never Used   Substance Use Topics    Alcohol use: Yes     Alcohol/week: 3.0 standard drinks     Types: 3 Cans of beer per week     Frequency: Never     Comment: on weekends only      History reviewed. No pertinent family history. Current Medications:  Current Outpatient Medications   Medication Sig    warfarin (COUMADIN) 4 mg tablet     metoprolol succinate (TOPROL-XL) 100 mg tablet Take 1 Tab by mouth daily.  VENTOLIN HFA 90 mcg/actuation inhaler Take 1 Puff by inhalation every four (4) hours as needed for Wheezing or Shortness of Breath.  fluticasone propionate (FLONASE) 50 mcg/actuation nasal spray 2 Sprays by Both Nostrils route daily.  albuterol (PROVENTIL VENTOLIN) 2.5 mg /3 mL (0.083 %) nebulizer solution 2.5 mg by Nebulization route every four (4) hours as needed.  atorvastatin (LIPITOR) 40 mg tablet Take 40 mg by mouth daily.  ipratropium (ATROVENT) 0.02 % soln 0.5 mg every four (4) hours as needed.  bicalutamide (CASODEX) 50 mg tablet Take 50 mg by mouth daily. No current facility-administered medications for this visit. Review of Systems: Comprehensive ROS was performed and all systems negative except for HPI.     Physical Exam:  Visit Vitals  /73 (BP 1 Location: Left arm, BP Patient Position: Sitting) Pulse 60   Temp 98.8 °F (37.1 °C) (Oral)   Resp 18   Ht 5' 9\" (1.753 m)   Wt 305 lb (138.3 kg) Comment: weight per pt   SpO2 93%   BMI 45.04 kg/m²       General:  Alert, cooperative, no distress, morbid obesity   Head:  Normocephalic, without obvious abnormality, atraumatic. Eyes:  Conjunctivae/corneas clear. PERRL, EOMs intact. Throat: Lips, mucosa, and tongue normal.    Neck: Supple, symmetrical, trachea midline, no adenopathy   Back:   Symmetric, no curvature. ROM normal. No CVA tenderness. Lungs:   Clear to auscultation bilaterally. Chest wall:     Heart:  Regular rate and rhythm, S1, S2 normal   Abdomen:   Soft, non-tender. Bowel sounds normal. No masses,  No organomegaly. Extremities: 3+ edema b/l LE   Skin: With hyperpigmentation in his legs from the edema   Lymph nodes: Cervical, supraclavicular, and axillary nodes normal.   Neurologic: CNII-XII intact. Imaging:    Chest X-Ray: Pending    CT abdomen pelvis  1. Extensive sherrie and osseous metastases likely reflective of metastatic  prostate neoplasm. 2. Bilateral hydronephrosis likely reflecting urinary bladder outlet obstruction  given suspected prostate cancer projection into the bladder base. 3. IVC filter with extensive superficial abdominal wall collaterals likely  reflects IVC occlusion at the level of filter, but this is not well evaluated on  unenhanced exam.  4. Dislocation of left hip arthroplasty prosthesis and capsular wear with  superior and lateral subluxation of right hip arthroplasty prosthesis. Labs:    External labs reviewed    Hgb: 8.2 - 8.5  Creat: 1.33      Assessment:      1.  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 Shabnam Young  PSA has come down.     Based on YESSI and MORIAH study, addition of either systemic chemotherapy for 6 cycles of Abiraterone improves OS and PFS. He is too old of systemic chemotherapy at this time. Thus I recommended Abiraterone.      I counseled the patient regarding the Abiraterone. Discussions included side-effect, toxicity, benefit and risks of chemotherapy. He understood the expected side-effect which includes nausea, anemia, edema and adrenal insufficiency. After weighing the benefit and risks, he agreed to proceed with Abiraterone herapy. He understands the alternative to this treatment.      I spent 65 minute with the patient in a face-to-face encounter. I explained him the stage of the disease, pathophysiology of the disease and the treatment approaches. I answered all his questions.         2. Bone metastasis     Start Denosumab  Continue ADT - per Urology  Start Abiraterone       3. Anemia from chronic disease    Observation      4. Morbid obesity    Unable to exercise  Asked to moderate calorie intake       Plan:         1. Continue ADT - per Urology  2. Start Denosumab  3. Start Abiraterone      Signed by: Redd Sosa MD                     August 9, 2019       CC. Juhi Gallego MD  CC.  Priscilla Marquez MD

## 2019-08-09 NOTE — PROGRESS NOTES
Oncology Navigator  Psychosocial Assessment    Reason for Assessment:    []Depression  []Anxiety  []Caregiver Stow  []Maladaptive Coping with Serious Illness   [x] Social Work Referral [] Initial Assessment  [] Other     Sources of Information:    [x]Patient  [x]Family  [x]Staff  [x]Medical Record     Advance Care Planning:  Advance Care Planning 5/18/2019   Confirm Advance Directive None       Mental Status:    [x]Alert  [x]Lethargic  []Unresponsive   [] Unable to assess   Oriented to:  [x]Person  [x]Place  [x]Time  [x]Situation      Barriers to Learning:    []Language  []Developmental  []Cognitive  []Altered Mental Status  []Visual/Hearing Impairment  []Unable to Read/Write  []Motivational   []No Barriers Identified  []Other:    Relationship Status:  []Single  []  [x]Significant Other/Life Partner  []  []  []      Living Circumstances:  []Lives Alone  [x]Family/Significant Other in Household  []Roommates  [x]Children in the Home  []Paid Caregivers  []Assisted Living Facility/Group Home  []Skilled [address]  []Homeless  []Incarcerated  []Environmental/Care Concerns  []other:    Employment Status:  []Employed Full-time []Employed Part-time []Homemaker [x] Disabled  [] Retired []Other:    Support System:    []Strong  []Fair  [x]Limited    Financial/Legal Concerns:    []Uninsured  [x]Limited Income/Resources  []Non-Citizen  []No Concerns Identified  []Financial POA:    []Other:    Restorationist/Spiritual/Existential:  [x]Strong Sense of Spirituality  []Involved in [employer]  []Request  Visit  []Expressing Spiritual/Existential Angst  []No Concerns Identified    Coping with Illness:         Patient: Family/Caregiver:   Understanding and Acceptance of Illness/Prognosis  [x] []   Strong Sense of Resilience [] []   Self Reflection [] []   Engaged Support System [] []   Does not Readily Discuss Illness [] []   Denial of Terminal Status [] []   Anger [] []   Depression [] [] Anxiety/Fear [] []   Bargaining [] []   Recent Diagnosis/Prognosis [x] []   Difficulties with Body Image [] []   Loss of Identity [] []   Excessive Substance Use [] []   Mental Health History [] []   Enmeshed Relationships [] []   History of Loss [] []   Anticipatory Grief [] []   Concern for Complicated Grief [] []   Suicidal Ideation or Plan [] []   Unable to assess [] []            Narrative:  Pt is single but has been with Agnes Dotson his girlfriend for x years. Pt disabled since 1996 when he had both hips replaced. PT grandtr who is 25 lives with them. PT shared his income is $5305 and has applied for Medicaid in the past and was not eligible - income too high. Pt able to do his own ADLs and does still drive (drove to today's appt)  Just gets around slow. Pt does belong to [address] [place] [address]  provided pt with info regarding low to no cost dental options. Pt has a power chair at home but doesn't always work. PT may need assistance to get Patricia. Girlfriend to come back on Tuesday at 8 or 11 to hear options of treatment again. Assessment/Action:   1. Introduce self and role of the  in the [address] [place] [address] Dr. 2. Informed the patient of the [facility] and available resources there. 3. Continue to meet with the patient when he returns to the clinic for ongoing assessment of the patient's adjustment to his diagnosis and treatment. 4. Ongoing psychosocial support as desired by patient. Plan/Referral:        Insurance/Entitlements referral  Financial/Medication assistance referral     Christella Fabry.  Kamaljit Martell, MSW/TERRIEW  Supervisee in Social Work

## 2019-08-13 ENCOUNTER — DOCUMENTATION ONLY (OUTPATIENT)
Dept: ONCOLOGY | Age: 54
End: 2019-08-13

## 2019-08-13 DIAGNOSIS — C61 PROSTATE CANCER METASTATIC TO MULTIPLE SITES (HCC): Primary | ICD-10-CM

## 2019-08-13 NOTE — PROGRESS NOTES
SIDRA FROM DR Patel Davis CBC WITH DIFF AND CMP TO BE DONE. Pt is aware to get dentist appt. Chemotherapy education packet provided to the patient and reviewed. Consent was also obtained. All questions and concerns were addressed. Time spent with patient approximately 15 minutes.

## 2019-08-14 DIAGNOSIS — C61 PROSTATE CANCER METASTATIC TO MULTIPLE SITES (HCC): ICD-10-CM

## 2019-08-16 ENCOUNTER — DOCUMENTATION ONLY (OUTPATIENT)
Dept: ONCOLOGY | Age: 54
End: 2019-08-16

## 2019-08-16 DIAGNOSIS — C61 PROSTATE CANCER METASTATIC TO MULTIPLE SITES (HCC): Primary | ICD-10-CM

## 2019-08-16 RX ORDER — ABIRATERONE 500 MG/1
1000 TABLET ORAL DAILY
Qty: 60 TAB | Refills: 11 | Status: SHIPPED | OUTPATIENT
Start: 2019-08-16 | End: 2020-01-20 | Stop reason: ALTCHOICE

## 2019-08-16 RX ORDER — PREDNISONE 5 MG/1
5 TABLET ORAL
Qty: 30 TAB | Refills: 11 | Status: SHIPPED | OUTPATIENT
Start: 2019-08-16 | End: 2019-10-14 | Stop reason: DRUGHIGH

## 2019-09-09 ENCOUNTER — TELEPHONE (OUTPATIENT)
Dept: ONCOLOGY | Age: 54
End: 2019-09-09

## 2019-09-11 ENCOUNTER — OFFICE VISIT (OUTPATIENT)
Dept: ONCOLOGY | Age: 54
End: 2019-09-11

## 2019-09-11 VITALS
HEIGHT: 69 IN | DIASTOLIC BLOOD PRESSURE: 77 MMHG | TEMPERATURE: 98.2 F | SYSTOLIC BLOOD PRESSURE: 121 MMHG | RESPIRATION RATE: 16 BRPM | OXYGEN SATURATION: 100 % | BODY MASS INDEX: 45.04 KG/M2 | HEART RATE: 66 BPM

## 2019-09-11 DIAGNOSIS — C61 PROSTATE CANCER METASTATIC TO BONE (HCC): Primary | ICD-10-CM

## 2019-09-11 DIAGNOSIS — C61 PROSTATE CANCER METASTATIC TO MULTIPLE SITES (HCC): ICD-10-CM

## 2019-09-11 DIAGNOSIS — C79.51 PROSTATE CANCER METASTATIC TO BONE (HCC): Primary | ICD-10-CM

## 2019-09-11 DIAGNOSIS — D63.8 ANEMIA, CHRONIC DISEASE: ICD-10-CM

## 2019-09-11 RX ORDER — LOSARTAN POTASSIUM 25 MG/1
TABLET ORAL DAILY
COMMUNITY

## 2019-09-11 RX ORDER — FUROSEMIDE 40 MG/1
40 TABLET ORAL AS NEEDED
COMMUNITY

## 2019-09-11 NOTE — PROGRESS NOTES
[facility]  [address], [address] [facility], [facility] Andres Loo, [address]  [phone]        Diagnosis:      1. Prostate adenocarcinoma with skeletal/retroperitoneal sherrie metastasis              High volume disease     Pre treatment PSA: 615     Treatment:      1. ADT per Dr. Zeny Moreno  2. Start Xgeva - awaiting dental clearance  3. Amy Brazen - started 8/17/2019     Subjective:      Mr. Keely Camacho is a very pleasant 61-year-old AA gentleman who was noted to have a significantly elevated PSA. He was referred to Urology and was seen by Dr. Zeny Moreno. CT revealed high volume metastatic cancer in the retroperitoneal nodes and bones. He also had hydronephrosis of the right kidney. He underwent stenting. He has been on ADT. PSA is coming down. He has been referred by evaluation of further treatment. He was seen by Dr. Nile Ordonez at [facility]. It seems it is more convenient for him to be seen at [facility]. He suffers with morbid obesity, b/l hip replacement, chronic congestive heart failure and ELI. He is confined to a wheelchair and is on disability. He started Amy Brazen about 1 month ago and is tolerating it well. Past Medical History:   Diagnosis Date    Atrial fibrillation ([address]     CHF (congestive heart failure) (HCC)     Chronic obstructive pulmonary disease (HCC)     Diabetes ([address]     pre-diabetic on metformin    Hypertension     Prostate enlargement       Past Surgical History:   Procedure Laterality Date    HX HERNIA REPAIR      ventral and inguinal    HX ORTHOPAEDIC      hip surgery      Prior to Admission medications    Medication Sig Start Date End Date Taking? Authorizing Provider   losartan (COZAAR) 25 mg tablet Take  by mouth daily. Yes Provider, Historical   furosemide (LASIX) 40 mg tablet Take 40 mg by mouth as needed. Yes Provider, Historical   abiraterone (ZYTIGA) 500 mg tab Take 1,000 mg by mouth daily.  8/16/19  Yes Shin Kendall MD   predniSONE (DELTASONE) 5 mg tablet Take 1 Tab by mouth daily (with breakfast). 8/16/19  Yes Lynne Ochoa MD   warfarin (COUMADIN) 4 mg tablet Pt is doing 5mg, due for recheck 9/13/19 8/1/19  Yes Provider, Historical   metoprolol succinate (TOPROL-XL) 100 mg tablet Take 1 Tab by mouth daily. 5/21/19  Yes Rabia Read MD   VENTOLIN HFA 90 mcg/actuation inhaler Take 1 Puff by inhalation every four (4) hours as needed for Wheezing or Shortness of Breath. 5/21/19  Yes Louisa Vickers MD   fluticasone propionate (FLONASE) 50 mcg/actuation nasal spray 2 Sprays by Both Nostrils route daily. 5/21/19  Yes Louisa Vickers MD   albuterol (PROVENTIL VENTOLIN) 2.5 mg /3 mL (0.083 %) nebulizer solution 2.5 mg by Nebulization route every four (4) hours as needed. 5/10/19   OtherMelba MD   atorvastatin (LIPITOR) 40 mg tablet Take 40 mg by mouth daily. 5/10/19   OtherMelba MD   ipratropium (ATROVENT) 0.02 % soln 0.5 mg every four (4) hours as needed. 5/10/19   Melba Winter MD   bicalutamide (CASODEX) 50 mg tablet Take 50 mg by mouth daily. Provider, Historical     No Known Allergies   Social History     Tobacco Use    Smoking status: Never Smoker    Smokeless tobacco: Never Used   Substance Use Topics    Alcohol use: Yes     Alcohol/week: 3.0 standard drinks     Types: 3 Cans of beer per week     Frequency: Never     Comment: on weekends only      History reviewed. No pertinent family history. Current Medications:  Current Outpatient Medications   Medication Sig    losartan (COZAAR) 25 mg tablet Take  by mouth daily.  furosemide (LASIX) 40 mg tablet Take 40 mg by mouth as needed.  abiraterone (ZYTIGA) 500 mg tab Take 1,000 mg by mouth daily.  predniSONE (DELTASONE) 5 mg tablet Take 1 Tab by mouth daily (with breakfast).  warfarin (COUMADIN) 4 mg tablet Pt is doing 5mg, due for recheck 9/13/19    metoprolol succinate (TOPROL-XL) 100 mg tablet Take 1 Tab by mouth daily.     VENTOLIN HFA 90 mcg/actuation inhaler Take 1 Puff by inhalation every four (4) hours as needed for Wheezing or Shortness of Breath.  fluticasone propionate (FLONASE) 50 mcg/actuation nasal spray 2 Sprays by Both Nostrils route daily.  albuterol (PROVENTIL VENTOLIN) 2.5 mg /3 mL (0.083 %) nebulizer solution 2.5 mg by Nebulization route every four (4) hours as needed.  atorvastatin (LIPITOR) 40 mg tablet Take 40 mg by mouth daily.  ipratropium (ATROVENT) 0.02 % soln 0.5 mg every four (4) hours as needed.  bicalutamide (CASODEX) 50 mg tablet Take 50 mg by mouth daily. No current facility-administered medications for this visit. Review of Systems: Comprehensive ROS was performed and all systems negative except for HPI. Physical Exam:    Visit Vitals  /77 (BP 1 Location: Left arm, BP Patient Position: At rest)   Pulse 66   Temp 98.2 °F (36.8 °C) (Oral)   Resp 16   Ht 5' 9\" (1.753 m)   SpO2 100% Comment: RA   BMI 45.04 kg/m²       Pain Scale: 3/10  Pain Location: Back      General:  Alert, cooperative, no distress, morbid obesity   Head:  Normocephalic, without obvious abnormality, atraumatic. Eyes:  Conjunctivae/corneas clear. PERRL, EOMs intact. Throat: Lips, mucosa, and tongue normal.    Neck: Supple, symmetrical, trachea midline, no adenopathy   Back:   Symmetric, no curvature. ROM normal. No CVA tenderness. Lungs:   Clear to auscultation bilaterally. Chest wall:     Heart:  Regular rate and rhythm, S1, S2 normal   Abdomen:   Soft, non-tender. Bowel sounds normal. No masses,  No organomegaly. Extremities: 3+ edema b/l LE   Skin: With hyperpigmentation in his legs from the edema   Lymph nodes: Cervical, supraclavicular, and axillary nodes normal.   Neurologic: CNII-XII intact.        Imaging:    CT Results (most recent):  Results from Hospital Encounter encounter on 05/17/19   CT ABD PELV WO CONT    Narrative EXAM: CT ABD PELV WO CONT    INDICATION: Malignant neoplasm of prostate ([facility]    COMPARISON: None. CONTRAST:  None. TECHNIQUE:   Thin axial images were obtained through the abdomen and pelvis. Coronal and  sagittal reconstructions were generated. Oral contrast was not administered. CT  dose reduction was achieved through use of a standardized protocol tailored for  this examination and automatic exposure control for dose modulation. The absence of intravenous contrast material reduces the sensitivity for  evaluation of the solid parenchymal organs of the abdomen. FINDINGS:   LUNG BASES: Clear. INCIDENTALLY IMAGED HEART AND MEDIASTINUM: Normal in size. Small pericardial  fluid. LIVER: No mass or biliary dilatation. GALLBLADDER: Unremarkable. SPLEEN: No mass. PANCREAS: No mass or ductal dilatation. ADRENALS: Unremarkable. KIDNEYS/URETERS: Bilateral hydronephrosis and hydroureter extending to the  urinary bladder. STOMACH: Unremarkable. SMALL BOWEL: No dilatation or wall thickening. COLON: No dilatation or wall thickening. APPENDIX: Unremarkable. PERITONEUM: No ascites or pneumoperitoneum. RETROPERITONEUM: There is retroperitoneal adenopathy with 1.8 x 1.8 cm left  periaortic node (3, 47), 1.5 x 2.1 cm posterior aortocaval node (3, 49), 1.4 x  2.2 cm anterior aortocaval node (3, 56), probable 2.1 x 3.1 cm aortic  bifurcation node (3, 62), 2.2 x 2.2 cm right common iliac node (3, 66), 1.5 x  2.2 cm left common iliac node (3, 65), 1.5 x 3.1 cm left pelvic sidewall node  (3, 70), 1.6 x 2.3 cm anterior right pelvic sidewall node (3, 74), 3.9 x 4.0 cm  right pelvic sidewall lymph node (3, 75), and 2.8 x 3.7 cm left inguinal region  node (3, 84). No aneurysm. IVC filter noted. REPRODUCTIVE ORGANS: Partially obscured by streak artifact from bilateral hip  arthroplasty prostheses. Prostate appears prominent and may project into the  bladder base. URINARY BLADDER: No mass or calculus. BONES: Diffuse sclerotic metastases.  There is dislocation with superior and  posterior displacement of the left hip arthroplasty prosthesis and lateral  subluxation of the femoral component of the right hip arthroplasty prosthesis  which is eccentrically located superiorly in the acetabular component. ADDITIONAL COMMENTS: Intensive venous collaterals in the anterior abdominal  wall. Impression IMPRESSION:    1. Extensive sherrie and osseous metastases likely reflective of metastatic  prostate neoplasm. 2. Bilateral hydronephrosis likely reflecting urinary bladder outlet obstruction  given suspected prostate cancer projection into the bladder base. 3. IVC filter with extensive superficial abdominal wall collaterals likely  reflects IVC occlusion at the level of filter, but this is not well evaluated on  unenhanced exam.  4. Dislocation of left hip arthroplasty prosthesis and capsular wear with  superior and lateral subluxation of right hip arthroplasty prosthesis. 23X       Labs:  Lab Results   Component Value Date/Time    WBC 5.1 05/21/2019 05:11 PM    HGB 9.3 (L) 05/21/2019 05:11 PM    HCT 29.3 (L) 05/21/2019 05:11 PM    PLATELET 529 (L) 44/62/8020 05:11 PM    MCV 94.5 05/21/2019 05:11 PM     Lab Results   Component Value Date/Time    Sodium 142 05/21/2019 02:52 AM    Potassium 4.0 05/21/2019 02:52 AM    Chloride 111 (H) 05/21/2019 02:52 AM    CO2 24 05/21/2019 02:52 AM    Anion gap 7 05/21/2019 02:52 AM    Glucose 101 (H) 05/21/2019 02:52 AM    BUN 29 (H) 05/21/2019 02:52 AM    Creatinine 2.76 (H) 05/21/2019 02:52 AM    BUN/Creatinine ratio 11 (L) 05/21/2019 02:52 AM    GFR est AA 29 (L) 05/21/2019 02:52 AM    GFR est non-AA 24 (L) 05/21/2019 02:52 AM    Calcium 8.2 (L) 05/21/2019 02:52 AM    Bilirubin, total 0.5 05/19/2019 05:00 AM    AST (SGOT) 23 05/19/2019 05:00 AM    Alk.  phosphatase 411 (H) 05/19/2019 05:00 AM    Protein, total 6.3 (L) 05/19/2019 05:00 AM    Albumin 2.4 (L) 05/19/2019 05:00 AM    Globulin 3.9 05/19/2019 05:00 AM    A-G Ratio 0.6 (L) 05/19/2019 05:00 AM ALT (SGPT) 10 (L) 05/19/2019 05:00 AM         Assessment:      1. 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 Shanice Herbert  PSA has come down.     Based on CHAASHLEYED and LATITUDE study, addition of either systemic chemotherapy for 6 cycles of Abiraterone improves OS and PFS. He is not a candidate for systemic chemotherapy due to co-morbid illnesses and performance status. Thus I recommended Abiraterone.      On Abiraterone - started 8/17/2019  Tolerating treatment very well  Denies any side effects. A detailed system by system evaluation of side effect was performed to assess chemotherapy related toxicity. Blood counts are acceptable. Results reviewed with the patient    Symptom management form reviewed with patient.       2. Bone metastasis     Start Denosumab - awaiting dental clearance  Continue ADT - per Urology  On Abiraterone       3. Anemia from chronic disease    Observation      4. Morbid obesity    Unable to exercise  Asked to moderate calorie intake       Plan:        > Continue Abiraterone   > Continue ADT - per Urology  > Start Denosumab after dental clearance  > Labs today: CBC, CMP, PSA, Testosterone  > Follow-up in 1 month        Signed by: Robbie Baumgarten, MD                     September 15, 2019        CC. Luis Nolasco MD  CC.  Khloe Jha MD

## 2019-09-11 NOTE — PROGRESS NOTES
61 y/o AAF here for f/u appt. For met. Prostate cancer. Pt is awaiting a dental appointment. Pt says he has received zytiga. and he started it on 8/17/19. and he brought his medications with him but he did not bring the prednisone with him. He was told by pharmacist to not take casodex anymore. He was told PCP told not to eliquis and do warfarin. He had it checked last week. Currently doing 5mg daily, go back Friday for a check. Pt presents in a volunteer wheelchair and reported he was unable to stand on the scale. 1. Have you been to the ER, urgent care clinic since your last visit? Hospitalized since your last visit? No    2. Have you seen or consulted any other health care providers outside of the [address] [place] [address] since your last visit? Include any pap smears or colon screening. Yes When: last week Where: [facility] Reason for visit: pcp f/u appt coumadin check    Chemo journal given to pt. Pt has questions regarding dental appointment, says he has medicare and his girlfriend said they won't pull it because he has medicaid. I am unsure, will refer to Dale Barnes. Pt asked if he can discuss medicaid. I will inform Dale Barnes.     SIDRA FROM DR Jimenez Ros TESTOSTERONE LEVEL, CBC WITH DIFF,CMP, AND PSA LEVEL

## 2019-09-12 ENCOUNTER — DOCUMENTATION ONLY (OUTPATIENT)
Dept: ONCOLOGY | Age: 54
End: 2019-09-12

## 2019-09-12 NOTE — PROGRESS NOTES
[address]  Social Work Navigator Encounter     Patient Name:  Kevin Griffin     Medical History: dx metastatic prostate    Advance Directives:    Narrative: SW called [facility] and learned pt could be seen and they would look into programs they have or set up a payment plan. SW inquired if he had called Cover [employer]- he had - he stated it took 25-30 minutes and he should know something in 20 days- they would try to rush.        Barriers to Care:     Assessment/Action:    Plan/Referral:     Other referral  Dental

## 2019-09-30 ENCOUNTER — TELEPHONE (OUTPATIENT)
Dept: ONCOLOGY | Age: 54
End: 2019-09-30

## 2019-09-30 NOTE — TELEPHONE ENCOUNTER
ONCOLOGY NURSE NAVIGATOR    Referral from [facility] for assistance w/ obtaining dental exam prior to start of Jose De [facility] Guera. TC PCP Dr. Yary Paz [phone] spoke to [place] [address], pt does not have Medicaid. Advised in need of dental exam.      [facility] Ref # [id number] spoke to Agnes Ortega who provided w/ codes for covered dental vs  0120 periodoic exam   [id number] comprehensive oral evaluation  Provided w/ Dr. Cyndi Brady -[phone], however number is not in service. Spoke to [address] office, they are currently not accepting pt, however will speak to dentist about this case, awaiting return call. Harjeet Artist Dr. Dc Palacios Appt 10/31/19 2:30pm for oral exam prior to beginning Xgeva. Called pt who states letter rec'd from [employer] $2584.45 11/1/19-4/30/20. Explained he would need to gather outstanding medical bills. States he does have, however unable to locate them. Provided email with contact numbers to providers. ONN to fax [employer] bills. Referral to [facility] for assistance w/ Medicaid, [facility] for ACP, and finances. Emailed flyer for Oct clinic to pt.     VM left w/ Fawad Heck (girlfriend w/ above information)    Mary Carrero RN

## 2019-10-14 ENCOUNTER — OFFICE VISIT (OUTPATIENT)
Dept: ONCOLOGY | Age: 54
End: 2019-10-14

## 2019-10-14 VITALS
DIASTOLIC BLOOD PRESSURE: 92 MMHG | BODY MASS INDEX: 45.18 KG/M2 | SYSTOLIC BLOOD PRESSURE: 146 MMHG | TEMPERATURE: 98.1 F | HEART RATE: 96 BPM | RESPIRATION RATE: 16 BRPM | WEIGHT: 305 LBS | HEIGHT: 69 IN | OXYGEN SATURATION: 98 %

## 2019-10-14 DIAGNOSIS — C61 PROSTATE CANCER METASTATIC TO MULTIPLE SITES (HCC): ICD-10-CM

## 2019-10-14 DIAGNOSIS — D63.8 ANEMIA, CHRONIC DISEASE: ICD-10-CM

## 2019-10-14 DIAGNOSIS — C79.51 PROSTATE CANCER METASTATIC TO BONE (HCC): Primary | ICD-10-CM

## 2019-10-14 DIAGNOSIS — C61 PROSTATE CANCER METASTATIC TO BONE (HCC): Primary | ICD-10-CM

## 2019-10-14 RX ORDER — OXYCODONE HYDROCHLORIDE 5 MG/1
5 TABLET ORAL
Qty: 60 TAB | Refills: 0 | Status: SHIPPED | OUTPATIENT
Start: 2019-10-14 | End: 2019-10-30 | Stop reason: SDUPTHER

## 2019-10-14 RX ORDER — PREDNISONE 5 MG/1
5 TABLET ORAL 2 TIMES DAILY
Qty: 60 TAB | Refills: 3 | Status: SHIPPED | OUTPATIENT
Start: 2019-10-14

## 2019-10-14 RX ORDER — TRAMADOL HYDROCHLORIDE 50 MG/1
50 TABLET ORAL
COMMUNITY
End: 2019-10-14 | Stop reason: ALTCHOICE

## 2019-10-14 NOTE — PROGRESS NOTES
63 y/o AAM here for f/u appt for met. Prostate cancer. Pt is on zytiga as of 8/17/19  Dentist appt was unable to happen due to pt was told they do not take medicare but do take medicaid. Pt spoke with social security and someone will help him get enrolled with medicaid. Pt c/o pain of bilateral hips that has worsened over 1-2months. pt had total hip replacements of both hips in 1989 and tried to do it again in 2000 but was unable to do so. Pt walks with crutches but if long distance he uses a w/c.    1. Have you been to the ER, urgent care clinic since your last visit? Hospitalized since your last visit? No    2. Have you seen or consulted any other health care providers outside of the [address] [place] [address] since your last visit? Include any pap smears or colon screening. No    VORB FROM DR Nelly Gomez PREDNISONE 5MG BID DISPENSE 60 REFILL 3. Pt has been doing 5mg once a day not twice a day, I educated him on doing this twice a day and the importance of adherence. Pt is not compliant with oral chemo journal.      This nurse informed pt to stop tramadol and t otake stool softeners with oxycodone. Pt needs palliative appt.     VORB FROM DR Nelly Gomez REFERRAL TO PALLIATIVE CARE

## 2019-10-16 NOTE — PROGRESS NOTES
[address] [facility]  [address], [address] [facility], [facility] [address], [address] [facility]  [phone]        Diagnosis:      1. Prostate adenocarcinoma with skeletal/retroperitoneal sherrie metastasis              High volume disease     Pre treatment PSA: 615     Treatment:      1. ADT per Dr. Agnes Palma  2. Start Xgeva - awaiting dental clearance  3. Luis M Pole - started 8/17/2019     Subjective:      Mr. Celine Nicole is a very pleasant 57-year-old AA gentleman who was noted to have a significantly elevated PSA. He was referred to Urology and was seen by Dr. Agnes Palma. CT revealed high volume metastatic cancer in the retroperitoneal nodes and bones. He also had hydronephrosis of the right kidney. He underwent stenting. He has been on ADT. PSA is coming down. He has been referred by evaluation of further treatment. He was seen by Dr. Stephani Gupta at [facility]. It seems it is more convenient for him to be seen at [facility]. He suffers with morbid obesity, b/l hip replacement, chronic congestive heart failure and ELI. He is confined to a wheelchair and is on disability. He started Luis M Pole about 1 month ago and is tolerating it well. Past Medical History:   Diagnosis Date    Atrial fibrillation ([address]     CHF (congestive heart failure) (HCC)     Chronic obstructive pulmonary disease (HCC)     Diabetes ([facility] [address]     pre-diabetic on metformin    Hypertension     Prostate enlargement       Past Surgical History:   Procedure Laterality Date    HX HERNIA REPAIR      ventral and inguinal    HX ORTHOPAEDIC      hip surgery      Prior to Admission medications    Medication Sig Start Date End Date Taking? Authorizing Provider   predniSONE (DELTASONE) 5 mg tablet Take 1 Tab by mouth two (2) times a day. 10/14/19  Yes Ramon Naqvi MD   oxyCODONE IR (ROXICODONE) 5 mg immediate release tablet Take 1 Tab by mouth every four (4) hours as needed for Pain for up to 30 days.  Max Daily Amount: 30 mg. 10/14/19 11/13/19 Yes Richard Moore MD   abiraterone (ZYTIGA) 500 mg tab Take 1,000 mg by mouth daily. 8/16/19  Yes Richard Moore MD   metoprolol succinate (TOPROL-XL) 100 mg tablet Take 1 Tab by mouth daily. 5/21/19  Yes Wilbur Reza MD   VENTOLIN HFA 90 mcg/actuation inhaler Take 1 Puff by inhalation every four (4) hours as needed for Wheezing or Shortness of Breath. 5/21/19  Yes Bertrand Vickers MD   fluticasone propionate (FLONASE) 50 mcg/actuation nasal spray 2 Sprays by Both Nostrils route daily. 5/21/19  Yes Wilbur Reza MD   losartan (COZAAR) 25 mg tablet Take  by mouth daily. Provider, Historical   furosemide (LASIX) 40 mg tablet Take 40 mg by mouth as needed. Provider, Historical   warfarin (COUMADIN) 4 mg tablet Pt is doing 5mg, due for recheck 9/13/19 8/1/19   Provider, Historical   albuterol (PROVENTIL VENTOLIN) 2.5 mg /3 mL (0.083 %) nebulizer solution 2.5 mg by Nebulization route every four (4) hours as needed. 5/10/19   OtherMelba MD   atorvastatin (LIPITOR) 40 mg tablet Take 40 mg by mouth daily. 5/10/19   Other, MD Melba   ipratropium (ATROVENT) 0.02 % soln 0.5 mg every four (4) hours as needed. 5/10/19   OtherMelba MD   bicalutamide (CASODEX) 50 mg tablet Take 50 mg by mouth daily. Provider, Historical     No Known Allergies   Social History     Tobacco Use    Smoking status: Never Smoker    Smokeless tobacco: Never Used   Substance Use Topics    Alcohol use: Yes     Alcohol/week: 3.0 standard drinks     Types: 3 Cans of beer per week     Frequency: Never     Comment: on weekends only      History reviewed. No pertinent family history. Current Medications:  Current Outpatient Medications   Medication Sig    predniSONE (DELTASONE) 5 mg tablet Take 1 Tab by mouth two (2) times a day.  oxyCODONE IR (ROXICODONE) 5 mg immediate release tablet Take 1 Tab by mouth every four (4) hours as needed for Pain for up to 30 days.  Max Daily Amount: 30 mg.   [facility] abiraterone (ZYTIGA) 500 mg tab Take 1,000 mg by mouth daily.  metoprolol succinate (TOPROL-XL) 100 mg tablet Take 1 Tab by mouth daily.  VENTOLIN HFA 90 mcg/actuation inhaler Take 1 Puff by inhalation every four (4) hours as needed for Wheezing or Shortness of Breath.  fluticasone propionate (FLONASE) 50 mcg/actuation nasal spray 2 Sprays by Both Nostrils route daily.  losartan (COZAAR) 25 mg tablet Take  by mouth daily.  furosemide (LASIX) 40 mg tablet Take 40 mg by mouth as needed.  warfarin (COUMADIN) 4 mg tablet Pt is doing 5mg, due for recheck 9/13/19    albuterol (PROVENTIL VENTOLIN) 2.5 mg /3 mL (0.083 %) nebulizer solution 2.5 mg by Nebulization route every four (4) hours as needed.  atorvastatin (LIPITOR) 40 mg tablet Take 40 mg by mouth daily.  ipratropium (ATROVENT) 0.02 % soln 0.5 mg every four (4) hours as needed.  bicalutamide (CASODEX) 50 mg tablet Take 50 mg by mouth daily. No current facility-administered medications for this visit. Review of Systems: Comprehensive ROS was performed and all systems negative except for HPI. Physical Exam:    Visit Vitals  BP (!) 146/92 (BP 1 Location: Left arm, BP Patient Position: At rest)   Pulse 96   Temp 98.1 °F (36.7 °C) (Oral)   Resp 16   Ht 5' 9\" (1.753 m)   Wt 305 lb (138.3 kg) Comment: 1monthago   SpO2 98% Comment: RA   BMI 45.04 kg/m²       Pain Scale: 9/10  Pain Location: Back (and hip)      General:  Alert, cooperative, no distress, morbid obesity   Head:  Normocephalic, without obvious abnormality, atraumatic. Eyes:  Conjunctivae/corneas clear. PERRL, EOMs intact. Throat: Lips, mucosa, and tongue normal.    Neck: Supple, symmetrical, trachea midline, no adenopathy   Back:   Symmetric, no curvature. ROM normal. No CVA tenderness. Lungs:   Clear to auscultation bilaterally. Chest wall:     Heart:  Regular rate and rhythm, S1, S2 normal   Abdomen:   Soft, non-tender.  Bowel sounds normal. No masses,  No organomegaly. Extremities: 3+ edema b/l LE   Skin: With hyperpigmentation in his legs from the edema   Lymph nodes: Cervical, supraclavicular, and axillary nodes normal.   Neurologic: CNII-XII intact. Imaging:    CT Results (most recent):  Results from Hospital Encounter encounter on 05/17/19   CT ABD PELV WO CONT    Narrative EXAM: CT ABD PELV WO CONT    INDICATION: Malignant neoplasm of prostate (HCC)    COMPARISON: None. CONTRAST:  None. TECHNIQUE:   Thin axial images were obtained through the abdomen and pelvis. Coronal and  sagittal reconstructions were generated. Oral contrast was not administered. CT  dose reduction was achieved through use of a standardized protocol tailored for  this examination and automatic exposure control for dose modulation. The absence of intravenous contrast material reduces the sensitivity for  evaluation of the solid parenchymal organs of the abdomen. FINDINGS:   LUNG BASES: Clear. INCIDENTALLY IMAGED HEART AND MEDIASTINUM: Normal in size. Small pericardial  fluid. LIVER: No mass or biliary dilatation. GALLBLADDER: Unremarkable. SPLEEN: No mass. PANCREAS: No mass or ductal dilatation. ADRENALS: Unremarkable. KIDNEYS/URETERS: Bilateral hydronephrosis and hydroureter extending to the  urinary bladder. STOMACH: Unremarkable. SMALL BOWEL: No dilatation or wall thickening. COLON: No dilatation or wall thickening. APPENDIX: Unremarkable. PERITONEUM: No ascites or pneumoperitoneum.   RETROPERITONEUM: There is retroperitoneal adenopathy with 1.8 x 1.8 cm left  periaortic node (3, 47), 1.5 x 2.1 cm posterior aortocaval node (3, 49), 1.4 x  2.2 cm anterior aortocaval node (3, 56), probable 2.1 x 3.1 cm aortic  bifurcation node (3, 62), 2.2 x 2.2 cm right common iliac node (3, 66), 1.5 x  2.2 cm left common iliac node (3, 65), 1.5 x 3.1 cm left pelvic sidewall node  (3, 70), 1.6 x 2.3 cm anterior right pelvic sidewall node (3, 74), 3.9 x 4.0 cm  right pelvic sidewall lymph node (3, 75), and 2.8 x 3.7 cm left inguinal region  node (3, 84). No aneurysm. IVC filter noted. REPRODUCTIVE ORGANS: Partially obscured by streak artifact from bilateral hip  arthroplasty prostheses. Prostate appears prominent and may project into the  bladder base. URINARY BLADDER: No mass or calculus. BONES: Diffuse sclerotic metastases. There is dislocation with superior and  posterior displacement of the left hip arthroplasty prosthesis and lateral  subluxation of the femoral component of the right hip arthroplasty prosthesis  which is eccentrically located superiorly in the acetabular component. ADDITIONAL COMMENTS: Intensive venous collaterals in the anterior abdominal  wall. Impression IMPRESSION:    1. Extensive sherrie and osseous metastases likely reflective of metastatic  prostate neoplasm. 2. Bilateral hydronephrosis likely reflecting urinary bladder outlet obstruction  given suspected prostate cancer projection into the bladder base. 3. IVC filter with extensive superficial abdominal wall collaterals likely  reflects IVC occlusion at the level of filter, but this is not well evaluated on  unenhanced exam.  4. Dislocation of left hip arthroplasty prosthesis and capsular wear with  superior and lateral subluxation of right hip arthroplasty prosthesis. 23X       Labs:    External labs reviewed    Hgb: 7.9  Creat: 1      Assessment:      1. 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 Med Guzmán  PSA has come down.     Based on YESSI and MORIAH study, addition of either systemic chemotherapy for 6 cycles of Abiraterone improves OS and PFS. He is not a candidate for systemic chemotherapy due to co-morbid illnesses and performance status.  Thus I recommended Abiraterone.      On Abiraterone - started 8/17/2019  Tolerating treatment very well  Denies any side effects. A detailed system by system evaluation of side effect was performed to assess chemotherapy related toxicity. Blood counts are acceptable. Results reviewed with the patient    Symptom management form reviewed with patient.       2. Bone metastasis     Denosumab - awaiting dental clearance  Continue ADT - per Urology  On Abiraterone       3. Anemia from chronic disease    Observation  RBC transfusion as necessary      4. Morbid obesity    Unable to exercise  Asked to moderate calorie intake       Plan:        > Continue Abiraterone   > Continue ADT - per Urology  > Start Denosumab after dental clearance  > Labs today  > Follow-up in 1 month        Signed by: Manuel Hill MD                     October 15, 2019        CC. Jose Stern MD  CC.  Suzy Magana MD

## 2019-10-18 ENCOUNTER — TELEPHONE (OUTPATIENT)
Dept: PALLATIVE CARE | Age: 54
End: 2019-10-18

## 2019-10-18 NOTE — TELEPHONE ENCOUNTER
[facility] Palliative Medicine Office  Nursing Note  [phone]  Fax [phone]      Name:  Kavitha Silveira  YOB: 1965    Received outpatient Palliative Medicine referral from Dr. Frankie Moore on 10.16.19 to see pt for symptom management and supportive care. Chart  reviewed. Kavitha Silveira is a 62 y.o. male with Prostate cancer. Pt's most recent office visit with  was on 10.14.19. See  note for complete HPI, treatment history, and plan. ACP:  Not on file    Uses crutches but wheelchair for long distance. Advised recently to stop tramadol and take stool softeners with oxycodone. CHF, COPD. Patient states he was dx with cancer April/May 2019 and has had hip and back pain since. He take oxycodone. Pain at time of call is 7/10, last dose pain med 1030 this am. Patient would like to be seen at [facility].                                                                                                                                              Nurse called pt and introduced Palliative Medicine services, scheduled to see Dr. Юлия Alicea October 30th at 1030. Mahsa Ledesma RN  [phone]

## 2019-10-29 ENCOUNTER — TELEPHONE (OUTPATIENT)
Dept: PALLATIVE CARE | Age: 54
End: 2019-10-29

## 2019-10-29 NOTE — TELEPHONE ENCOUNTER
Patient calling to confirm his appt for 10/30/19. Provided address and patient to arrive at 10:10 for appt. Pt states he is in wheelchair.

## 2019-10-30 ENCOUNTER — OFFICE VISIT (OUTPATIENT)
Dept: PALLATIVE CARE | Age: 54
End: 2019-10-30

## 2019-10-30 ENCOUNTER — TELEPHONE (OUTPATIENT)
Dept: PALLATIVE CARE | Age: 54
End: 2019-10-30

## 2019-10-30 VITALS
OXYGEN SATURATION: 98 % | RESPIRATION RATE: 20 BRPM | HEART RATE: 99 BPM | DIASTOLIC BLOOD PRESSURE: 86 MMHG | SYSTOLIC BLOOD PRESSURE: 136 MMHG | TEMPERATURE: 97.7 F

## 2019-10-30 DIAGNOSIS — M54.59 INTRACTABLE LOW BACK PAIN: ICD-10-CM

## 2019-10-30 DIAGNOSIS — M25.552 CHRONIC BILATERAL HIP PAIN AFTER TOTAL REPLACEMENT OF BOTH HIP JOINTS: Primary | ICD-10-CM

## 2019-10-30 DIAGNOSIS — G89.3 CANCER RELATED PAIN: ICD-10-CM

## 2019-10-30 DIAGNOSIS — K59.03 DRUG-INDUCED CONSTIPATION: ICD-10-CM

## 2019-10-30 DIAGNOSIS — Z96.643 CHRONIC BILATERAL HIP PAIN AFTER TOTAL REPLACEMENT OF BOTH HIP JOINTS: Primary | ICD-10-CM

## 2019-10-30 DIAGNOSIS — M25.551 CHRONIC BILATERAL HIP PAIN AFTER TOTAL REPLACEMENT OF BOTH HIP JOINTS: Primary | ICD-10-CM

## 2019-10-30 DIAGNOSIS — C61 PROSTATE CANCER METASTATIC TO MULTIPLE SITES (HCC): ICD-10-CM

## 2019-10-30 DIAGNOSIS — G89.28 CHRONIC BILATERAL HIP PAIN AFTER TOTAL REPLACEMENT OF BOTH HIP JOINTS: Primary | ICD-10-CM

## 2019-10-30 RX ORDER — OXYCODONE HYDROCHLORIDE 10 MG/1
10 TABLET ORAL
Qty: 90 TAB | Refills: 0 | Status: SHIPPED | OUTPATIENT
Start: 2019-10-30 | End: 2019-11-11

## 2019-10-30 NOTE — PROGRESS NOTES
Palliative Medicine Outpatient Services  [place]: [phone] ([phone])    Patient Name: Kendra Carrera  YOB: 1965    Date of Current Visit: 10/30/19  Location of Current Visit:    [] [facility] Office  [] [facility] Office  [x] [facility] Office  [] Home  [] Other:      Date of Initial Visit: 10/30/2019  Referral from: Dr. Katie Cordero  Primary Care Physician: Gladis Hilario MD      SUMMARY:   Kendra Carrear is a 62y.o. year old with a  history of bilateral hip replacement in 1980s but sockets out of the joint due to weight, CHF, Le[place], atrial fibrillation, high-volume prostate cancer metastatic to the bones, who was referred to Palliative Medicine by Dr. Katie Cordero for management of symptoms and psychosocial support. The patients social history includes on disability since 1996, wheelchair-bound, able to transfer to bed and toilet, lives with his significant other of many years Lizeth Guon. No children of his own. Rest of family in [place]. Current treatment- Abiraterone + ADT per urology, awaiting on denosumab after dental clearance. Patient here today for urgent new visit due to intractable low back pain    Initial Referral Intake note from Silverio Vergara RN reviewed prior to visit   PALLIATIVE DIAGNOSES:       ICD-10-CM ICD-9-CM    1. Chronic bilateral hip pain after total replacement of both hip joints M25.559 719.45     G89.28 338.29     Z96.643 V43.64    2. Intractable low back pain M54.5 724.2    3. Prostate cancer metastatic to multiple sites ([facility] [address] C61 185 oxyCODONE IR (ROXICODONE) 10 mg tab immediate release tablet     199.0 oxyCODONE ER (XTAMPZA ER) 9 mg capsule   4. Cancer related pain G89.3 338.3    5.  Drug-induced constipation K59.03 564.09      E980.5           PLAN:   Patient Instructions     Dear Kendra Carrera ,    It was a pleasure seeing you today at [facility] office    We will see you again in 2 weeks    If labs or imaging tests have been ordered for you today, please call the office  at [phone] 48 hours after completion to obtain the results. Your stated goal:   Better pain control    Your described symptoms were: Fatigue: 8 Drowsiness: 4   Depression: 7 Pain: 8   Anxiety: 7 Nausea: 0   Anorexia: 2 Dyspnea: 2   Best Well-Being: 10 Constipation: No           This is the plan we talked about:  1. B/l hip and back pain  - You are in significant uncontrolled cancer related pain along with b/l hips joints out of socket. - Let us increase your Oxycodone to 10 mg every 4 hours during the day and start Oxycodone ER 10 mg at bedtime. Oxycodone ER is your long acting medicine and this will help you sleep better. Once we know you tolerate all these dose increases, we will start you on a long acting medicine during the day as well. 2. Constipation  -Constipation is a common side effect of pain medications as they slow your bowels. -Please start Pericolace 2 tabs daily and increase to 2 tabs two times a day if needed. This is necessary to keep your bowels soft. - Add Miralax every other day as a laxative. - Goal is to have soft bowel movements every day or every other day. - Please call us if you do not have bowel movements for more than 3 days. 3. ACP  - You filled an AMD naming your significant other Martin Madden as you mPOA. This is what you have shared with us about Advance Care Planning:      Advance Care Planning 5/18/2019   Confirm Advance Directive None           The Palliative Medicine Team is here to support you and your family.        Sincerely,      Arline Lopez MD and the Palliative Medicine Team       GOALS OF CARE / TREATMENT PREFERENCES:   [====Goals of Care====]  GOALS OF CARE:  Patient / health care proxy stated goals: See Patient Instructions / Summary    TREATMENT PREFERENCES:   Code Status:  [x] Attempt Resuscitation       [] Do Not Attempt Resuscitation    Advance Care Planning:  [x] The [facility] Interdisciplinary Team has updated the ACP Navigator with Decision Maker and Patient Capacity         Primary Decision Maker: Yong Posadasiensameer - [phone]  Advance Care Planning 5/18/2019   Confirm Advance Directive None           Other:  (If patient appropriate for POST, consider using PALLPOST smart phrase here)    The palliative care team has discussed with patient / health care proxy about goals of care / treatment preferences for patient.  [====Goals of Care====]     PRESCRIPTIONS GIVEN:     Medications Ordered Today   Medications    oxyCODONE IR (ROXICODONE) 10 mg tab immediate release tablet     Sig: Take 1 Tab by mouth every four (4) hours as needed for Pain for up to 15 days. Max Daily Amount: 60 mg. Dispense:  90 Tab     Refill:  0    oxyCODONE ER (XTAMPZA ER) 9 mg capsule     Sig: Take 1 Cap by mouth daily for 15 days. Max Daily Amount: 9 mg. Dispense:  15 Cap     Refill:  0           FOLLOW UP:     Future Appointments   Date Time Provider [facility]als in [place]   11/11/2019 10:15 AM Basim Grewal MD [facility]    11/11/2019 10:30 AM Patricio Castanon MD [facility] [address] [facility] IN CARE:   Patient Care Team:  Bobby Covarrubias MD as PCP - General (Internal Medicine)       HISTORY:   Reviewed patient-completed ESAS and advance care planning form. Reviewed patient record in prescription monitoring program.    CHIEF COMPLAINT: No chief complaint on file. HPI/SUBJECTIVE:    The patient is: [x] Verbal / [] Nonverbal     Patient here with his significant other. He is in quite a bit of pain in his hip joints and lower back. He has been having chronic hip joint pain since the 1980s. He reports being an alcoholic back in the 7460Z and developed complete destruction of bilateral hip and therefore he had hip replacement in 1986 and 1989 followed by which he had gained a significant amount of weight and had another work-related accident where he fell and both his hip joints popped out of socket.   He is not a candidate for repair and has been on disability since 1996 for the same. He is wheelchair-bound and walks very little due to pain and weight, he is able to transfer to bed and bedside commode our bathroom. He does have significant pain with straining or weightbearing in his hips. With cancer he has been having excruciating low back pain. He is tried oxycodone 5 mg and Percocet which helps him but relief lasts only 3 to 4 hours. He is unable to sleep due to pain. He is apprehensive of starting opioids but wants better pain relief. Clinical Pain Assessment (nonverbal scale for nonverbal patients):   [++++ Clinical Pain Assessment++++]  [++++Pain Severity++++]: Pain: 8  [++++Pain Character++++]: throbbing, gnawing  [++++Pain Duration++++]: months  [++++Pain Effect++++]: functional and emotional  [++++Pain Factors++++]: weightbearing and movement  [++++Pain Frequency++++]: constant  [++++Pain Location++++]: bilateral hip and low back   [++++ Clinical Pain Assessment++++]       FUNCTIONAL ASSESSMENT:     Palliative Performance Scale (PPS):  PPS: 50       PSYCHOSOCIAL/SPIRITUAL SCREENING:     Any spiritual / Judaism concerns:  [] Yes /  [x] No    Caregiver Burnout:  [] Yes /  [x] No /  [] No Caregiver Present      Anticipatory grief assessment:   [x] Normal  / [] Maladaptive       ESAS Anxiety: Anxiety: 7    ESAS Depression: Depression: 7       REVIEW OF SYSTEMS:     The following systems were [x] reviewed / [] unable to be reviewed  Systems: constitutional, ears/nose/mouth/throat, respiratory, gastrointestinal, genitourinary, musculoskeletal, integumentary, neurologic, psychiatric, endocrine. Positive findings noted below.   Modified ESAS Completed by: provider   Fatigue: 8 Drowsiness: 4   Depression: 7 Pain: 8   Anxiety: 7 Nausea: 0   Anorexia: 2 Dyspnea: 2   Best Well-Being: 10 Constipation: No              PHYSICAL EXAM:     Wt Readings from Last 3 Encounters:   10/14/19 305 lb (138.3 kg)   08/08/19 305 lb (138.3 kg) 05/21/19 (!) 382 lb 9.6 oz (173.5 kg)     Blood pressure 136/86, pulse 99, temperature 97.7 °F (36.5 °C), temperature source Oral, resp. rate 20, SpO2 98 %. Last bowel movement: See Nursing Note    Constitutional: Alert, oriented, obese  Eyes: pupils equal, anicteric  ENMT: no nasal discharge, moist mucous membranes  Cardiovascular: regular rhythm, distal pulses intact  Respiratory: breathing not labored, symmetric  Gastrointestinal: soft non-tender, +bowel sounds  Musculoskeletal: Wide hips with tenderness to palpation on both hips, tenderness to palpation on low back. Skin: warm, dry  Neurologic: following commands, moving all extremities  Psychiatric: full affect, no hallucinations  Other:       HISTORY:     Past Medical History:   Diagnosis Date    Atrial fibrillation ([address]     CHF (congestive heart failure) (HCC)     Chronic obstructive pulmonary disease (HCC)     Diabetes (HCC)     pre-diabetic on metformin    Hypertension     Prostate enlargement       Past Surgical History:   Procedure Laterality Date    HX HERNIA REPAIR      ventral and inguinal    HX ORTHOPAEDIC      hip surgery      No family history on file. History reviewed, no pertinent family history. Social History     Tobacco Use    Smoking status: Never Smoker    Smokeless tobacco: Never Used   Substance Use Topics    Alcohol use: Yes     Alcohol/week: 3.0 standard drinks     Types: 3 Cans of beer per week     Frequency: Never     Comment: on weekends only     No Known Allergies   Current Outpatient Medications   Medication Sig    oxyCODONE IR (ROXICODONE) 10 mg tab immediate release tablet Take 1 Tab by mouth every four (4) hours as needed for Pain for up to 15 days. Max Daily Amount: 60 mg.    oxyCODONE ER (XTAMPZA ER) 9 mg capsule Take 1 Cap by mouth daily for 15 days. Max Daily Amount: 9 mg.  predniSONE (DELTASONE) 5 mg tablet Take 1 Tab by mouth two (2) times a day.     abiraterone (ZYTIGA) 500 mg tab Take 1,000 mg by mouth daily.  warfarin (COUMADIN) 4 mg tablet Pt is doing 5mg, due for recheck 9/13/19    metoprolol succinate (TOPROL-XL) 100 mg tablet Take 1 Tab by mouth daily.  VENTOLIN HFA 90 mcg/actuation inhaler Take 1 Puff by inhalation every four (4) hours as needed for Wheezing or Shortness of Breath.  fluticasone propionate (FLONASE) 50 mcg/actuation nasal spray 2 Sprays by Both Nostrils route daily.  losartan (COZAAR) 25 mg tablet Take  by mouth daily.  furosemide (LASIX) 40 mg tablet Take 40 mg by mouth as needed.  albuterol (PROVENTIL VENTOLIN) 2.5 mg /3 mL (0.083 %) nebulizer solution 2.5 mg by Nebulization route every four (4) hours as needed.  atorvastatin (LIPITOR) 40 mg tablet Take 40 mg by mouth daily.  ipratropium (ATROVENT) 0.02 % soln 0.5 mg every four (4) hours as needed.  bicalutamide (CASODEX) 50 mg tablet Take 50 mg by mouth daily. No current facility-administered medications for this visit. LAB DATA REVIEWED:     Lab Results   Component Value Date/Time    WBC 5.1 05/21/2019 05:11 PM    HGB 9.3 (L) 05/21/2019 05:11 PM    PLATELET 838 (L) 95/79/6467 05:11 PM     Lab Results   Component Value Date/Time    Sodium 142 05/21/2019 02:52 AM    Potassium 4.0 05/21/2019 02:52 AM    Chloride 111 (H) 05/21/2019 02:52 AM    CO2 24 05/21/2019 02:52 AM    BUN 29 (H) 05/21/2019 02:52 AM    Creatinine 2.76 (H) 05/21/2019 02:52 AM    Calcium 8.2 (L) 05/21/2019 02:52 AM    Magnesium 1.7 05/18/2019 03:51 AM    Phosphorus 3.7 05/18/2019 03:51 AM      Lab Results   Component Value Date/Time    AST (SGOT) 23 05/19/2019 05:00 AM    Alk.  phosphatase 411 (H) 05/19/2019 05:00 AM    Protein, total 6.3 (L) 05/19/2019 05:00 AM    Albumin 2.4 (L) 05/19/2019 05:00 AM    Globulin 3.9 05/19/2019 05:00 AM     No results found for: INR, PTMR, PTP, PT1, PT2, APTT, INREXT, INREXT   Lab Results   Component Value Date/Time    Iron 102 05/20/2019 04:30 AM    TIBC 144 (L) 05/20/2019 04:30 AM    Iron % saturation 71 (H) 05/20/2019 04:30 AM    Ferritin 593 (H) 05/20/2019 04:30 AM           CONTROLLED SUBSTANCES SAFETY ASSESSMENT (IF ON CONTROLLED SUBSTANCES):     Reviewed opioid safety handout:  [x] Yes   [] No  24 hour opioid dose >150mg morphine equivalent/day:  [] Yes   [x] No  Benzodiazepines:  [] Yes   [x] No  Sleep apnea:  [] Yes   [x] No  Urine Toxicology Testing within last 6 months:  [] Yes   [x] No  History of or new aberrant medication taking behaviors:  [] Yes   [x] No  Has Narcan been prescribed [x] Yes   [] No          Total time: 70 minutes  Counseling / coordination time:   > 50% counseling / coordination?:

## 2019-10-30 NOTE — PROGRESS NOTES
Lymphedema Physical Therapy Discharge Summary documented 10/30/19    Patient was evaluated on 19 and he did not return to therapy after the evaluation. He had a scheduled appt on 3/7/19 but he no showed for the appt. He is being discharged from therapy as his plan of care has  and no goals were met for discharge.     Arrie Riedel, MSPT, DYLAN-WINSTON

## 2019-10-30 NOTE — PROGRESS NOTES
Palliative Medicine Office Visit  Palliative Medicine Nurse Check In  [phone]    Patient Name: Shellie Webb  YOB: 1965      Date of Office Visit: 10/30/19    Patient states: \"office visit  \"    From Check In Sheet (scanned in Media):  Has a medical provider talked with you about cardiopulmonary resuscitation (CPR)? [] Yes   [x] No   [] Unable to obtain    Nurse reminder to complete or update ACP FlowSheet:    Is ACP on the Problem List?    [] Yes    [x] No  IF ACP Document is ON FILE; Nurse to place ACP on Problem List     Is there an ACP Note in Chart Review/Note? [] Yes    [x] No   If NO: [address] Planning 5/18/2019   Confirm Advance Directive None       Is there anything that we should know about you as a person in order to provide you the best care possible? \"Bilateral hips out of socket\"    Have you been to the ER, urgent care clinic since your last visit? [] Yes   [x] No   [] Unable to obtain    Have you been hospitalized since your last visit? [] Yes   [x] No   [] Unable to obtain    Have you seen or consulted any other health care providers outside of the [address] [place] [address] since your last visit? [] Yes   [x] No   [] Unable to obtain    Functional status (describe): Independent with ADL's, uses crutches to get to restroom, shower, bathing.       Last BM: Today     accessed (date): 10/30/19    Bottle review (for opioid pain medication):  Medication 1: Oxycodone 5mg  Date filled:  10/19/19  Directions: 1 tab every 4 hours  # filled: 60  # left:   # pills taking per day: 3-4  Last dose taken: this morning

## 2019-10-30 NOTE — TELEPHONE ENCOUNTER
Called to discuss patient's whereabouts and young woman who answered phone states he is getting ready to come to 10:30am appt now. She would not provide name.

## 2019-10-30 NOTE — ACP (ADVANCE CARE PLANNING)
Advance Care Planning (ACP) Provider Conversation Snapshot    Date of ACP Conversation: 10/30/19  Persons included in Conversation:  patient and POA  Length of ACP Conversation in minutes:  16 minutes    Authorized Decision Maker (if patient is incapable of making informed decisions):    This person is:   Healthcare Agent/Medical Power of  under Advance Directive      Primary Decision Maker: Lara Bledsoe  Yoliesameer - [phone]        For Patients with Decision Making Capacity:   Values/Goals: Exploration of values, goals, and preferences if recovery is not expected, even with continued medical treatment in the event of:  Imminent death    Conversation Outcomes / Follow-Up Plan:   Completed new Advance Directive

## 2019-10-30 NOTE — PATIENT INSTRUCTIONS
Dear Torri Ordoñez ,    It was a pleasure seeing you today at [address] [facility] office    We will see you again in 2 weeks    If labs or imaging tests have been ordered for you today, please call the office  at [phone] 48 hours after completion to obtain the results. Your stated goal:   Better pain control    Your described symptoms were: Fatigue: 8 Drowsiness: 4   Depression: 7 Pain: 8   Anxiety: 7 Nausea: 0   Anorexia: 2 Dyspnea: 2   Best Well-Being: 10 Constipation: No           This is the plan we talked about:  1. B/l hip and back pain  - You are in significant uncontrolled cancer related pain along with b/l hips joints out of socket. - Let us increase your Oxycodone to 10 mg every 4 hours during the day and start Oxycodone ER 10 mg at bedtime. Oxycodone ER is your long acting medicine and this will help you sleep better. Once we know you tolerate all these dose increases, we will start you on a long acting medicine during the day as well. 2. Constipation  -Constipation is a common side effect of pain medications as they slow your bowels. -Please start Pericolace 2 tabs daily and increase to 2 tabs two times a day if needed. This is necessary to keep your bowels soft. - Add Miralax every other day as a laxative. - Goal is to have soft bowel movements every day or every other day. - Please call us if you do not have bowel movements for more than 3 days. 3. ACP  - You filled an AMD naming your significant other Lethea Gallo as you mPOA. This is what you have shared with us about Advance Care Planning:      Advance Care Planning 5/18/2019   Confirm Advance Directive None           The Palliative Medicine Team is here to support you and your family.        Sincerely,      Paulo Moran MD and the Palliative Medicine Team

## 2019-10-31 NOTE — PROGRESS NOTES
[facility]  Palliative Medicine Outpatient   Psychosocial Assessment  [phone]      Reason for Assessment:    New Patient    Sources of Information:    [x]Patient  [x]Family  []Staff  []Medical Record    Narrative:   Carli Myers is a 62 y.o. male presenting for a first time appointment with Dr. Yony Ricardo and the PM team for symptom management and psychosocial support due to a diagnosis of prostate cancer and CHF. Patient's life partner, Kerri Lambert, was also in the appointment. Patient's social history includes; patient lives with his life partner Kerri Lambert and an 24 yo granddaughter. Patient used to work in security and has been on disability since [address] [facility]. Patient reports he has a history of alcohol use d/o and is currently sober. Patient's life partner has 4 adult children who are supportive. Patient belongs to [employer]. Patient reports that when he is home alone and has time to think about his health issues he becomes emotional and anxious. Assessment:  Patient was alert and oriented x4. Patient's mood was anxious and affect congruent. Patient's insight and judgment were good. Patient's thought process and speech were logical and clear.      Advance Care Planning:    Primary Decision Maker: Emeterio Tucker Partner - [phone]    Secondary Decision Maker: Yola Sweeney - Child - [phone]  Advance Care Planning 5/18/2019   Confirm Advance Directive None       Mental Status:    [x]Alert  []Lethargic  []Unresponsive  Oriented to:  [x]Person  [x]Place  [x]Time  [x]Situation      Barriers to Learning:    []Language  []Developmental  []Cognitive  []Altered Mental Status  []Forgetful []Visual/Hearing Impairment  []Unable to Read/Write  []Motivational   [x]No Barriers Identified  []Other:    Relationship Status:  []Single  []  []Significant Other/Life Partner  []  []  []      Living Circumstances:  []Lives Alone [x]Family/Significant Other in Household  []Roommates  []Children in the Home  []Paid Caregivers  []Assisted Living Facility/Group [address]  []Homeless  []Incarcerated  []Environmental/Care Concerns  []Transportation Issues  [] Issues  []Domestic Violence []Other:    Support System:    []Strong  [x]Fair  []Limited    Sleep Habits:   []Poor [x]Unsatisfactory []Satisfactory []Good []Very Good   Specific sleep problems? Due to pain  Financial/Legal Concerns:    []Uninsured  [x]Limited Income/Resources  []Non-Citizen  []Utility Issues  []Food Insecurity []No Concerns Identified  []Financial POA:    [x]Other: receives disability    Catholic/Spiritual/Existential:  [x]Strong Sense of Spirituality  [x]Involved in [employer]  []Request  Visit  []Expressing Ara Gregory  []No Concerns Identified    Coping with Illness:         Patient: Family/Caregiver:   Understanding and Acceptance of Illness/Prognosis  [x] [x]   Strong Sense of Resilience [x] [x]   Self Reflection [] []   Engaged Support System [] []   Does not Readily Discuss Illness [] []   Denial of Terminal Status [] []   Anger [] []   Depression [] []   Anxiety/Fear [x] []   Bargaining [] []   Recent Diagnosis/Prognosis [] []   Difficulties with Body Image [] []   Loss of Identity [] []   Excessive Substance Use [] []   Mental Health History [] []   Enmeshed Relationships [] []   History of Loss [] []   Anticipatory Grief [] []   Concern for Complicated Grief [] []   Suicidal Ideation or Plan [] []   Caregiver Stress [] [x]   Unable to assess [] []     Goals/Plan:        1. Introduced self and role of this  on the Palliative Medicine Team.    2.  Informed the patient of the ability to provide supportive counseling if needed. 3.  Continue to meet with the patient when he returns to the clinic for ongoing assessment of the patients adjustment to treatment.     4.  Ongoing psychosocial support as desired by patient and/or patients family.        Yobany Frias [facility]   Palliative Medicine,    [phone]

## 2019-10-31 NOTE — ACP (ADVANCE CARE PLANNING)
Advance Medical Directive     Today Mr. Sanjeev Lorenzo completed an Advance Medical Directive, naming healthcare agents:      Primary Decision Maker: Debra Hood Partner - [phone]    Secondary Decision Maker: Paresh Stover - Child - [phone]  Advance Care Planning 5/18/2019   Confirm Advance Directive None     Patient did not fill out the portion stating healthcare decisions. Original AMD provided to Mr. Sanjeev Lorenzo. Copies of AMD provided for healthcare agents and scanned into [employer] [facility].       Iwona Muniz [facility]   Palliative Medicine,   [phone]

## 2019-11-07 ENCOUNTER — TELEPHONE (OUTPATIENT)
Dept: PALLATIVE CARE | Age: 54
End: 2019-11-07

## 2019-11-11 ENCOUNTER — OFFICE VISIT (OUTPATIENT)
Dept: PALLATIVE CARE | Age: 54
End: 2019-11-11

## 2019-11-11 ENCOUNTER — TELEPHONE (OUTPATIENT)
Dept: PALLATIVE CARE | Age: 54
End: 2019-11-11

## 2019-11-11 ENCOUNTER — OFFICE VISIT (OUTPATIENT)
Dept: ONCOLOGY | Age: 54
End: 2019-11-11

## 2019-11-11 VITALS
HEART RATE: 93 BPM | SYSTOLIC BLOOD PRESSURE: 149 MMHG | WEIGHT: 305 LBS | BODY MASS INDEX: 45.18 KG/M2 | DIASTOLIC BLOOD PRESSURE: 96 MMHG | OXYGEN SATURATION: 98 % | HEIGHT: 69 IN | RESPIRATION RATE: 18 BRPM

## 2019-11-11 VITALS
TEMPERATURE: 97.8 F | SYSTOLIC BLOOD PRESSURE: 121 MMHG | DIASTOLIC BLOOD PRESSURE: 74 MMHG | HEIGHT: 69 IN | BODY MASS INDEX: 45.04 KG/M2 | HEART RATE: 92 BPM | RESPIRATION RATE: 16 BRPM | OXYGEN SATURATION: 96 %

## 2019-11-11 DIAGNOSIS — M54.9 INTRACTABLE BACK PAIN: Primary | ICD-10-CM

## 2019-11-11 DIAGNOSIS — C61 PROSTATE CANCER METASTATIC TO BONE (HCC): Primary | ICD-10-CM

## 2019-11-11 DIAGNOSIS — C79.51 PROSTATE CANCER METASTATIC TO BONE (HCC): Primary | ICD-10-CM

## 2019-11-11 DIAGNOSIS — C61 PROSTATE CANCER METASTATIC TO MULTIPLE SITES (HCC): ICD-10-CM

## 2019-11-11 DIAGNOSIS — G89.28 CHRONIC BILATERAL HIP PAIN AFTER TOTAL REPLACEMENT OF BOTH HIP JOINTS: ICD-10-CM

## 2019-11-11 DIAGNOSIS — Z96.643 CHRONIC BILATERAL HIP PAIN AFTER TOTAL REPLACEMENT OF BOTH HIP JOINTS: ICD-10-CM

## 2019-11-11 DIAGNOSIS — M25.552 CHRONIC BILATERAL HIP PAIN AFTER TOTAL REPLACEMENT OF BOTH HIP JOINTS: ICD-10-CM

## 2019-11-11 DIAGNOSIS — K59.03 DRUG-INDUCED CONSTIPATION: ICD-10-CM

## 2019-11-11 DIAGNOSIS — M25.551 CHRONIC BILATERAL HIP PAIN AFTER TOTAL REPLACEMENT OF BOTH HIP JOINTS: ICD-10-CM

## 2019-11-11 RX ORDER — NALOXONE HYDROCHLORIDE 4 MG/.1ML
SPRAY NASAL
Qty: 2 EACH | Refills: 1 | Status: SHIPPED | OUTPATIENT
Start: 2019-11-11

## 2019-11-11 RX ORDER — OXYCODONE HYDROCHLORIDE 20 MG/1
20 TABLET ORAL
Qty: 90 TAB | Refills: 0 | Status: SHIPPED | OUTPATIENT
Start: 2019-11-11 | End: 2019-11-26

## 2019-11-11 NOTE — PROGRESS NOTES
Palliative Medicine Office Visit  Palliative Medicine Nurse Check In  [phone]    Patient Name: Lurdes Fonseca  YOB: 1965      Date of Office Visit: 11/11/2019     Patient states: \"  \"    From Check In Sheet (scanned in Media):  Has a medical provider talked with you about cardiopulmonary resuscitation (CPR)? [x] Yes   [] No   [] Unable to obtain    Nurse reminder to complete or update ACP FlowSheet:    Is ACP on the Problem List?    [x] Yes    [] No  IF ACP Document is ON FILE; Nurse to place ACP on Problem List     Is there an ACP Note in Chart Review/Note? [x] Yes    [] No   If NO: ALERT PROVIDER       Primary Decision Maker: Ashley Ortiz Partner - [phone]    Secondary Decision Maker: [place] - Child - [phone]  Advance Care Planning 11/11/2019   Patient's Healthcare Decision Maker is: Named in scanned ACP document   Confirm Advance Directive Yes, on file         Is there anything that we should know about you as a person in order to provide you the best care possible? Have you been to the ER, urgent care clinic since your last visit? [] Yes   [x] No   [] Unable to obtain    Have you been hospitalized since your last visit? [] Yes   [x] No   [] Unable to obtain    Have you seen or consulted any other health care providers outside of the [address] [place] [address] since your last visit?    [] Yes   [x] No   [] Unable to obtain    Functional status (describe):         Last BM: 11/10/2019      accessed (date): 11/11/2019     Bottle review (for opioid pain medication):  Medication 1:   Date filled:   Directions:   # filled:   # left:   # pills taking per day:  Last dose taken:    Medication 2:   Date filled:   Directions:   # filled:   # left:   # pills taking per day:  Last dose taken:    Medication 3:   Date filled:   Directions:   # filled:   # left:   # pills taking per day:  Last dose taken:    Medication 4:   Date filled:   Directions:   # filled:   # left:   # pills taking per day:  Last dose taken:

## 2019-11-11 NOTE — TELEPHONE ENCOUNTER
Patient is calling regarding pain medication. States the only RX picked up at pharmacy was a nasal spray. Advised nurse would call him back to discuss.

## 2019-11-11 NOTE — PROGRESS NOTES
Palliative Medicine Outpatient Services  [address]: [phone]    Patient Name: John Meyers  YOB: 1965    Date of Current Visit: 11/11/19  Location of Current Visit:    [] [facility] Office  [] [facility] Office  [x] [facility] Office  [] Home  [] Other:      Date of Initial Visit: 10/30/2019  Referral from: Dr. Elza Faith  Primary Care Physician: Raegan Peterson MD      SUMMARY:   John Meyers is a 62y.o. year old with a  history of bilateral hip replacement in 1980s but sockets out of the joint due to weight, CHF, Lea, atrial fibrillation, high-volume prostate cancer metastatic to the bones, who was referred to Palliative Medicine by Dr. Elza Faith for management of symptoms and psychosocial support. The patients social history includes on disability since 1996, wheelchair-bound, able to transfer to bed and toilet, lives with his significant other of many years Liliane Garay. No children of his own. Rest of family in [place]. Current treatment- Abiraterone + ADT per urology, awaiting on denosumab after dental clearance. Patient here for follow-up, pain remains uncontrolled pain he is very tearful. PALLIATIVE DIAGNOSES:       ICD-10-CM ICD-9-CM    1. Intractable back pain M54.9 724.5    2. Prostate cancer metastatic to multiple sites ([facility] [address] C61 185 oxyCODONE IR (ROXICODONE) 20 mg immediate release tablet     199.0 oxyCODONE ER (XTAMPZA ER) 13.5 mg capsule   3. 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 Drug-induced constipation K59.03 564.09      E980.5           PLAN:   Patient Instructions     Dear John Meyers ,    It was a pleasure seeing you today at [facility] office    We will see you again in 2 weeks    If labs or imaging tests have been ordered for you today, please call the office  at [phone] 48 hours after completion to obtain the results.       Your stated goal:   Better pain control    Your described symptoms were: Fatigue: 10 Drowsiness: 5   Depression: 7 Pain: 10   Anxiety: 6 Nausea: 0   Anorexia: 0 Dyspnea: 6   Best Well-Being: 10 Constipation: No   Other Problem (Comment): 0       This is the plan we talked about:    1. B/l hip and back pain  - You are in significant uncontrolled cancer related pain along with b/l hips joints out of socket. - The increase in Oxycodone and initiation of Karmen Erichsen has helped some but you are still in a lot of pain. - Let us increase Xtampza to 13 mg capsule two times a day and increase Oxycodone to 20 mg every 4 hours as needed    2. Constipation  -Constipation is a common side effect of pain medications as they slow your bowels. -Please start Pericolace 2 tabs daily and increase to 2 tabs two times a day if needed. This is necessary to keep your bowels soft. - Add Miralax every other day as a laxative. - Goal is to have soft bowel movements every day or every other day. - Please call us if you do not have bowel movements for more than 3 days. 3. ACP  - You filled an AMD naming your significant other Lizeth Smith as you mPOA. This is what you have shared with us about Advance Care Planning:      Advance Care Planning 11/11/2019   Patient's Healthcare Decision Maker is: Named in scanned ACP document   Confirm Advance Directive Yes, on file           The Palliative Medicine Team is here to support you and your family.        Sincerely,      Karina Glass MD and the Palliative Medicine Team       GOALS OF CARE / TREATMENT PREFERENCES:   [====Goals of Care====]  GOALS OF CARE:  Patient / health care proxy stated goals: See Patient Instructions / Summary    TREATMENT PREFERENCES:   Code Status:  [x] Attempt Resuscitation       [] Do Not Attempt Resuscitation    Advance Care Planning:  [x] [employer] [facility] Interdisciplinary Team has updated the ACP Navigator with Decision Maker and Patient Capacity         Primary Decision Maker: Krissy Lawson - Life Partner - [phone]    Secondary Decision Maker: Mikal Mobley [phone]  Advance Care Planning 11/11/2019   Patient's Healthcare Decision Maker is: Named in scanned ACP document   Confirm Advance Directive Yes, on file           Other:  (If patient appropriate for POST, consider using PALLPOST smart phrase here)    The palliative care team has discussed with patient / health care proxy about goals of care / treatment preferences for patient.  [====Goals of Care====]     PRESCRIPTIONS GIVEN:     Medications Ordered Today   Medications    oxyCODONE IR (ROXICODONE) 20 mg immediate release tablet     Sig: Take 1 Tab by mouth every four (4) hours as needed for Pain for up to 15 days. Max Daily Amount: 120 mg. Dispense:  90 Tab     Refill:  0    oxyCODONE ER (XTAMPZA ER) 13.5 mg capsule     Sig: Take 1 Cap by mouth daily for 15 days. Max Daily Amount: 13.5 mg.     Dispense:  15 Cap     Refill:  0    naloxone (NARCAN) 4 mg/actuation nasal spray     Sig: Use 1 spray intranasally, then discard. Repeat with new spray every 2 min as needed for opioid overdose symptoms, alternating nostrils. Dispense:  2 Each     Refill:  1           FOLLOW UP:     Future Appointments   Date Time Provider Juve Mart[place]nezi   11/27/2019  9:30 AM Omer Dan MD [facility].   12/11/2019  9:15 AM Zackery Herrera MD [facility] IN CARE:   Patient Care Team:  Rhonda Mendoza MD as PCP - General (Internal Medicine)       HISTORY:   Reviewed patient-completed ESAS and advance care planning form. Reviewed patient record in prescription monitoring program.    CHIEF COMPLAINT: No chief complaint on file. HPI/SUBJECTIVE:    The patient is: [x] Verbal / [] Nonverbal     Patient here in a wheelchair today, very tearful because he is in a lot of pain. He took oxycodone 10 mg at 9 AM and it did not last more than 2 hours today.   He feels there is some improvement in increasing oxycodone to 10 mg and initiating Extensa but overall his pain still remains very uncontrolled and he is miserable. Constipation is better with regular bowel regimen but he forgets to take medications because he is in pain all the time. ---------    Patient here with his significant other. He is in quite a bit of pain in his hip joints and lower back. He has been having chronic hip joint pain since the 1980s. He reports being an alcoholic back in the 6939P and developed complete destruction of bilateral hip and therefore he had hip replacement in 1986 and 1989 followed by which he had gained a significant amount of weight and had another work-related accident where he fell and both his hip joints popped out of socket. He is not a candidate for repair and has been on disability since 1996 for the same. He is wheelchair-bound and walks very little due to pain and weight, he is able to transfer to bed and bedside commode our bathroom. He does have significant pain with straining or weightbearing in his hips. With cancer he has been having excruciating low back pain. He is tried oxycodone 5 mg and Percocet which helps him but relief lasts only 3 to 4 hours. He is unable to sleep due to pain. He is apprehensive of starting opioids but wants better pain relief.     Clinical Pain Assessment (nonverbal scale for nonverbal patients):   [++++ Clinical Pain Assessment++++]  [++++Pain Severity++++]: Pain: 10  [++++Pain Character++++]: throbbing, gnawing  [++++Pain Duration++++]: months  [++++Pain Effect++++]: functional and emotional  [++++Pain Factors++++]: weightbearing and movement  [++++Pain Frequency++++]: constant  [++++Pain Location++++]: bilateral hip and low back   [++++ Clinical Pain Assessment++++]       FUNCTIONAL ASSESSMENT:     Palliative Performance Scale (PPS):  PPS: 70       PSYCHOSOCIAL/SPIRITUAL SCREENING:     Any spiritual / Yarsani concerns:  [] Yes /  [x] No    Caregiver Burnout:  [] Yes /  [x] No /  [] No Caregiver Present      Anticipatory grief assessment:   [x] Normal  / [] Maladaptive       ESAS Anxiety: Anxiety: 6    ESAS Depression: Depression: 7       REVIEW OF SYSTEMS:     The following systems were [x] reviewed / [] unable to be reviewed  Systems: constitutional, ears/nose/mouth/throat, respiratory, gastrointestinal, genitourinary, musculoskeletal, integumentary, neurologic, psychiatric, endocrine. Positive findings noted below. Modified ESAS Completed by: provider   Fatigue: 10 Drowsiness: 5   Depression: 7 Pain: 10   Anxiety: 6 Nausea: 0   Anorexia: 0 Dyspnea: 6   Best Well-Being: 10 Constipation: No   Other Problem (Comment): 0          PHYSICAL EXAM:     Wt Readings from Last 3 Encounters:   11/11/19 305 lb (138.3 kg)   10/14/19 305 lb (138.3 kg)   08/08/19 305 lb (138.3 kg)     Blood pressure (!) 149/96, pulse 93, resp. rate 18, height 5' 9\" (1.753 m), weight 305 lb (138.3 kg), SpO2 98 %. Last bowel movement: See Nursing Note    Constitutional: Alert, oriented, obese  Eyes: pupils equal, anicteric  ENMT: no nasal discharge, moist mucous membranes  Cardiovascular: regular rhythm, distal pulses intact  Respiratory: breathing not labored, symmetric  Gastrointestinal: soft non-tender, +bowel sounds  Musculoskeletal: Wide hips with tenderness to palpation on both hips, tenderness to palpation on low back. Skin: warm, dry  Neurologic: following commands, moving all extremities  Psychiatric: full affect, no hallucinations  Other:       HISTORY:     Past Medical History:   Diagnosis Date    Atrial fibrillation ([address]     CHF (congestive heart failure) (HCC)     Chronic obstructive pulmonary disease (HCC)     Diabetes (HCC)     pre-diabetic on metformin    Hypertension     Prostate enlargement       Past Surgical History:   Procedure Laterality Date    HX HERNIA REPAIR      ventral and inguinal    HX ORTHOPAEDIC      hip surgery      History reviewed.  No pertinent family history. History reviewed, no pertinent family history. Social History     Tobacco Use    Smoking status: Never Smoker    Smokeless tobacco: Never Used   Substance Use Topics    Alcohol use: Yes     Alcohol/week: 3.0 standard drinks     Types: 3 Cans of beer per week     Frequency: Never     Comment: on weekends only     No Known Allergies   Current Outpatient Medications   Medication Sig    oxyCODONE IR (ROXICODONE) 20 mg immediate release tablet Take 1 Tab by mouth every four (4) hours as needed for Pain for up to 15 days. Max Daily Amount: 120 mg.    oxyCODONE ER (XTAMPZA ER) 13.5 mg capsule Take 1 Cap by mouth daily for 15 days. Max Daily Amount: 13.5 mg.    naloxone (NARCAN) 4 mg/actuation nasal spray Use 1 spray intranasally, then discard. Repeat with new spray every 2 min as needed for opioid overdose symptoms, alternating nostrils.  predniSONE (DELTASONE) 5 mg tablet Take 1 Tab by mouth two (2) times a day.  losartan (COZAAR) 25 mg tablet Take  by mouth daily.  furosemide (LASIX) 40 mg tablet Take 40 mg by mouth as needed.  abiraterone (ZYTIGA) 500 mg tab Take 1,000 mg by mouth daily.  warfarin (COUMADIN) 4 mg tablet Pt is doing 5mg, due for recheck 9/13/19    metoprolol succinate (TOPROL-XL) 100 mg tablet Take 1 Tab by mouth daily.  VENTOLIN HFA 90 mcg/actuation inhaler Take 1 Puff by inhalation every four (4) hours as needed for Wheezing or Shortness of Breath.  fluticasone propionate (FLONASE) 50 mcg/actuation nasal spray 2 Sprays by Both Nostrils route daily.  albuterol (PROVENTIL VENTOLIN) 2.5 mg /3 mL (0.083 %) nebulizer solution 2.5 mg by Nebulization route every four (4) hours as needed.  atorvastatin (LIPITOR) 40 mg tablet Take 40 mg by mouth daily.  ipratropium (ATROVENT) 0.02 % soln 0.5 mg every four (4) hours as needed.  bicalutamide (CASODEX) 50 mg tablet Take 50 mg by mouth daily. No current facility-administered medications for this visit. LAB DATA REVIEWED:     Lab Results   Component Value Date/Time    WBC 5.1 05/21/2019 05:11 PM    HGB 9.3 (L) 05/21/2019 05:11 PM    PLATELET 737 (L) 76/73/2417 05:11 PM     Lab Results   Component Value Date/Time    Sodium 142 05/21/2019 02:52 AM    Potassium 4.0 05/21/2019 02:52 AM    Chloride 111 (H) 05/21/2019 02:52 AM    CO2 24 05/21/2019 02:52 AM    BUN 29 (H) 05/21/2019 02:52 AM    Creatinine 2.76 (H) 05/21/2019 02:52 AM    Calcium 8.2 (L) 05/21/2019 02:52 AM    Magnesium 1.7 05/18/2019 03:51 AM    Phosphorus 3.7 05/18/2019 03:51 AM      Lab Results   Component Value Date/Time    AST (SGOT) 23 05/19/2019 05:00 AM    Alk.  phosphatase 411 (H) 05/19/2019 05:00 AM    Protein, total 6.3 (L) 05/19/2019 05:00 AM    Albumin 2.4 (L) 05/19/2019 05:00 AM    Globulin 3.9 05/19/2019 05:00 AM     No results found for: INR, PTMR, PTP, PT1, PT2, APTT, INREXT, INREXT   Lab Results   Component Value Date/Time    Iron 102 05/20/2019 04:30 AM    TIBC 144 (L) 05/20/2019 04:30 AM    Iron % saturation 71 (H) 05/20/2019 04:30 AM    Ferritin 593 (H) 05/20/2019 04:30 AM           CONTROLLED SUBSTANCES SAFETY ASSESSMENT (IF ON CONTROLLED SUBSTANCES):     Reviewed opioid safety handout:  [x] Yes   [] No  24 hour opioid dose >150mg morphine equivalent/day:  [] Yes   [x] No  Benzodiazepines:  [] Yes   [x] No  Sleep apnea:  [] Yes   [x] No  Urine Toxicology Testing within last 6 months:  [] Yes   [x] No  History of or new aberrant medication taking behaviors:  [] Yes   [x] No  Has Narcan been prescribed [x] Yes   [] No          Total time: 70 minutes  Counseling / coordination time:   > 50% counseling / coordination?:

## 2019-11-11 NOTE — PATIENT INSTRUCTIONS
Dear Ayaka Clifton , It was a pleasure seeing you today at [facility] office We will see you again in 2 weeks If labs or imaging tests have been ordered for you today, please call the office  at [phone] 48 hours after completion to obtain the results. Your stated goal:  
Better pain control Your described symptoms were: Fatigue: 10 Drowsiness: 5 Depression: 7 Pain: 10 Anxiety: 6 Nausea: 0 Anorexia: 0 Dyspnea: 6 Best Well-Being: 10 Constipation: No  
Other Problem (Comment): 0 This is the plan we talked about: 
 
1. B/l hip and back pain - You are in significant uncontrolled cancer related pain along with b/l hips joints out of socket. - The increase in Oxycodone and initiation of Kalin Curlin has helped some but you are still in a lot of pain. - Let us increase Xtampza to 13 mg capsule two times a day and increase Oxycodone to 20 mg every 4 hours as needed 2. Constipation 
-Constipation is a common side effect of pain medications as they slow your bowels. -Please start Pericolace 2 tabs daily and increase to 2 tabs two times a day if needed. This is necessary to keep your bowels soft. - Add Miralax every other day as a laxative. - Goal is to have soft bowel movements every day or every other day. - Please call us if you do not have bowel movements for more than 3 days. 3. ACP 
- You filled an AMD naming your significant other Homar Gibson as you mPOA. This is what you have shared with us about Advance Care Planning: 
 
 
Advance Care Planning 11/11/2019 Patient's Healthcare Decision Maker is: Named in scanned ACP document Confirm Advance Directive Yes, on file The Palliative Medicine Team is here to support you and your family.   
 
 
Sincerely, 
 
 
Jaime Tian MD and the Palliative Medicine Team

## 2019-11-11 NOTE — PROGRESS NOTES
Identified pt with two pt identifiers(name and ). Reviewed record in preparation for visit and have obtained necessary documentation. Chief Complaint   Patient presents with    Prostate Cancer        Visit Vitals  /74 (BP 1 Location: Left arm, BP Patient Position: Sitting)   Pulse 92   Temp 97.8 °F (36.6 °C) (Oral)   Resp 16   Ht 5' 9\" (1.753 m)   SpO2 96%   BMI 45.04 kg/m²       Pain Scale: 10 - Worst pain ever/10  Pain Location: Hip (Back)    1) Have you been to an emergency room, urgent care, or hospitalized since your last visit? If yes, where when, and reason for visit? no       2. Have seen or consulted any other health care provider since your last visit? If yes, where when, and reason for visit?   NO

## 2019-11-11 NOTE — TELEPHONE ENCOUNTER
Per pharmacy , Verlinda Lather has to be ordered and Oxycodone is too early for refill , patient notified and will await pharmacy contact when rx is ready for

## 2019-11-12 NOTE — PROGRESS NOTES
[address] [facility]  [address], [address] [facility] [address], [address] [facility]  [phone]        Diagnosis:      1. Prostate adenocarcinoma with skeletal/retroperitoneal sherrie metastasis              High volume disease     Pre treatment PSA: 615     Treatment:      1. ADT per Dr. Jareth Saenz  2. Start Xgeva - awaiting dental clearance  3. Albino Honour - started 8/17/2019     Subjective:      Mr. Franck Wharton is a very pleasant 69-year-old AA gentleman who was noted to have a significantly elevated PSA. He was referred to Urology and was seen by Dr. Jareth Saenz. CT revealed high volume metastatic cancer in the retroperitoneal nodes and bones. He also had hydronephrosis of the right kidney. He underwent stenting. He has been on ADT. PSA is coming down. He has been referred by evaluation of further treatment. He was seen by Dr. Alta Scott at [facility]. It seems it is more convenient for him to be seen at [facility]. He suffers with morbid obesity, b/l hip replacement, chronic congestive heart failure and ELI. He is confined to a wheelchair and is on disability. He has been taking Zytiga and is tolerating it well. Denies any side effects. Pain in the hips is severe. Past Medical History:   Diagnosis Date    Atrial fibrillation ([address]     CHF (congestive heart failure) (HCC)     Chronic obstructive pulmonary disease (HCC)     Diabetes ([address]     pre-diabetic on metformin    Hypertension     Prostate enlargement       Past Surgical History:   Procedure Laterality Date    HX HERNIA REPAIR      ventral and inguinal    HX ORTHOPAEDIC      hip surgery      Prior to Admission medications    Medication Sig Start Date End Date Taking? Authorizing Provider   predniSONE (DELTASONE) 5 mg tablet Take 1 Tab by mouth two (2) times a day. 10/14/19  Yes Nona Urban MD   losartan (COZAAR) 25 mg tablet Take  by mouth daily.    Yes Provider, Historical   furosemide (LASIX) 40 mg tablet Take 40 mg by mouth as needed. Yes Provider, Historical   abiraterone (ZYTIGA) 500 mg tab Take 1,000 mg by mouth daily. 8/16/19  Yes Shakila Camacho MD   warfarin (COUMADIN) 4 mg tablet Pt is doing 5mg, due for recheck 9/13/19 8/1/19  Yes Provider, Historical   metoprolol succinate (TOPROL-XL) 100 mg tablet Take 1 Tab by mouth daily. 5/21/19  Yes Paras Correa MD   VENTOLIN HFA 90 mcg/actuation inhaler Take 1 Puff by inhalation every four (4) hours as needed for Wheezing or Shortness of Breath. 5/21/19  Yes Joshua Vickers MD   fluticasone propionate (FLONASE) 50 mcg/actuation nasal spray 2 Sprays by Both Nostrils route daily. 5/21/19  Yes Joshua Vickers MD   atorvastatin (LIPITOR) 40 mg tablet Take 40 mg by mouth daily. 5/10/19  Yes Melba Winter MD   ipratropium (ATROVENT) 0.02 % soln 0.5 mg every four (4) hours as needed. 5/10/19  Yes Melba Winter MD   oxyCODONE IR (ROXICODONE) 20 mg immediate release tablet Take 1 Tab by mouth every four (4) hours as needed for Pain for up to 15 days. Max Daily Amount: 120 mg. 11/11/19 11/26/19  Luly Betts MD   naloxone [facility]) 4 mg/actuation nasal spray Use 1 spray intranasally, then discard. Repeat with new spray every 2 min as needed for opioid overdose symptoms, alternating nostrils. 11/11/19   Luly Betts MD   oxyCODONE ER [facility]) 13.5 mg capsule Take 1 Cap by mouth every twelve (12) hours for 30 days. Max Daily Amount: 27 mg. 11/11/19 12/11/19  Luly Betts MD   albuterol (PROVENTIL VENTOLIN) 2.5 mg /3 mL (0.083 %) nebulizer solution 2.5 mg by Nebulization route every four (4) hours as needed. 5/10/19   Melba Winter MD   bicalutamide (CASODEX) 50 mg tablet Take 50 mg by mouth daily. Provider, Historical     No Known Allergies   Social History     Tobacco Use    Smoking status: Never Smoker    Smokeless tobacco: Never Used   Substance Use Topics    Alcohol use:  Yes     Alcohol/week: 3.0 standard drinks     Types: 3 Cans of beer per week     Frequency: Never     Comment: on weekends only      History reviewed. No pertinent family history. Current Medications:  Current Outpatient Medications   Medication Sig    predniSONE (DELTASONE) 5 mg tablet Take 1 Tab by mouth two (2) times a day.  losartan (COZAAR) 25 mg tablet Take  by mouth daily.  furosemide (LASIX) 40 mg tablet Take 40 mg by mouth as needed.  abiraterone (ZYTIGA) 500 mg tab Take 1,000 mg by mouth daily.  warfarin (COUMADIN) 4 mg tablet Pt is doing 5mg, due for recheck 9/13/19    metoprolol succinate (TOPROL-XL) 100 mg tablet Take 1 Tab by mouth daily.  VENTOLIN HFA 90 mcg/actuation inhaler Take 1 Puff by inhalation every four (4) hours as needed for Wheezing or Shortness of Breath.  fluticasone propionate (FLONASE) 50 mcg/actuation nasal spray 2 Sprays by Both Nostrils route daily.  atorvastatin (LIPITOR) 40 mg tablet Take 40 mg by mouth daily.  ipratropium (ATROVENT) 0.02 % soln 0.5 mg every four (4) hours as needed.  oxyCODONE IR (ROXICODONE) 20 mg immediate release tablet Take 1 Tab by mouth every four (4) hours as needed for Pain for up to 15 days. Max Daily Amount: 120 mg.    naloxone (NARCAN) 4 mg/actuation nasal spray Use 1 spray intranasally, then discard. Repeat with new spray every 2 min as needed for opioid overdose symptoms, alternating nostrils.  oxyCODONE ER (XTAMPZA ER) 13.5 mg capsule Take 1 Cap by mouth every twelve (12) hours for 30 days. Max Daily Amount: 27 mg.    albuterol (PROVENTIL VENTOLIN) 2.5 mg /3 mL (0.083 %) nebulizer solution 2.5 mg by Nebulization route every four (4) hours as needed.  bicalutamide (CASODEX) 50 mg tablet Take 50 mg by mouth daily. No current facility-administered medications for this visit. Review of Systems: Comprehensive ROS was performed and all systems negative except for HPI.     Physical Exam:    Visit Vitals  /74 (BP 1 Location: Left arm, BP Patient Position: Sitting)   Pulse 92   Temp 97.8 °F (36.6 °C) (Oral)   Resp 16   Ht 5' 9\" (1.753 m)   SpO2 96%   BMI 45.04 kg/m²       Pain Scale: 10 - Worst pain ever/10  Pain Location: Hip (Back)      General:  Alert, cooperative, no distress, morbid obesity   Head:  Normocephalic, without obvious abnormality, atraumatic. Eyes:  Conjunctivae/corneas clear. PERRL, EOMs intact. Throat: Lips, mucosa, and tongue normal.    Neck: Supple, symmetrical, trachea midline, no adenopathy   Back:   Symmetric, no curvature. ROM normal. No CVA tenderness. Lungs:   Clear to auscultation bilaterally. Chest wall:     Heart:  Regular rate and rhythm, S1, S2 normal   Abdomen:   Soft, non-tender. Bowel sounds normal. No masses,  No organomegaly. Extremities: 3+ edema b/l LE   Skin: With hyperpigmentation in his legs from the edema   Lymph nodes: Cervical, supraclavicular, and axillary nodes normal.   Neurologic: CNII-XII intact. Imaging:    CT Results (most recent):  Results from Hospital Encounter encounter on 05/17/19   CT ABD PELV WO CONT    Narrative EXAM: CT ABD PELV WO CONT    INDICATION: Malignant neoplasm of prostate (HCC)    COMPARISON: None. CONTRAST:  None. TECHNIQUE:   Thin axial images were obtained through the abdomen and pelvis. Coronal and  sagittal reconstructions were generated. Oral contrast was not administered. CT  dose reduction was achieved through use of a standardized protocol tailored for  this examination and automatic exposure control for dose modulation. The absence of intravenous contrast material reduces the sensitivity for  evaluation of the solid parenchymal organs of the abdomen. FINDINGS:   LUNG BASES: Clear. INCIDENTALLY IMAGED HEART AND MEDIASTINUM: Normal in size. Small pericardial  fluid. LIVER: No mass or biliary dilatation. GALLBLADDER: Unremarkable. SPLEEN: No mass. PANCREAS: No mass or ductal dilatation. ADRENALS: Unremarkable.   KIDNEYS/URETERS: Bilateral hydronephrosis and hydroureter extending to the  urinary bladder. STOMACH: Unremarkable. SMALL BOWEL: No dilatation or wall thickening. COLON: No dilatation or wall thickening. APPENDIX: Unremarkable. PERITONEUM: No ascites or pneumoperitoneum. RETROPERITONEUM: There is retroperitoneal adenopathy with 1.8 x 1.8 cm left  periaortic node (3, 47), 1.5 x 2.1 cm posterior aortocaval node (3, 49), 1.4 x  2.2 cm anterior aortocaval node (3, 56), probable 2.1 x 3.1 cm aortic  bifurcation node (3, 62), 2.2 x 2.2 cm right common iliac node (3, 66), 1.5 x  2.2 cm left common iliac node (3, 65), 1.5 x 3.1 cm left pelvic sidewall node  (3, 70), 1.6 x 2.3 cm anterior right pelvic sidewall node (3, 74), 3.9 x 4.0 cm  right pelvic sidewall lymph node (3, 75), and 2.8 x 3.7 cm left inguinal region  node (3, 84). No aneurysm. IVC filter noted. REPRODUCTIVE ORGANS: Partially obscured by streak artifact from bilateral hip  arthroplasty prostheses. Prostate appears prominent and may project into the  bladder base. URINARY BLADDER: No mass or calculus. BONES: Diffuse sclerotic metastases. There is dislocation with superior and  posterior displacement of the left hip arthroplasty prosthesis and lateral  subluxation of the femoral component of the right hip arthroplasty prosthesis  which is eccentrically located superiorly in the acetabular component. ADDITIONAL COMMENTS: Intensive venous collaterals in the anterior abdominal  wall. Impression IMPRESSION:    1. Extensive sherrie and osseous metastases likely reflective of metastatic  prostate neoplasm. 2. Bilateral hydronephrosis likely reflecting urinary bladder outlet obstruction  given suspected prostate cancer projection into the bladder base.   3. IVC filter with extensive superficial abdominal wall collaterals likely  reflects IVC occlusion at the level of filter, but this is not well evaluated on  unenhanced exam.  4. Dislocation of left hip arthroplasty prosthesis and capsular wear with  superior and lateral subluxation of right hip arthroplasty prosthesis. 23X           Assessment:      1. 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 Alarcon Del  PSA has come down.     Based on CHAASHLEYED and LATITUDE study, addition of either systemic chemotherapy for 6 cycles of Abiraterone improves OS and PFS. He is not a candidate for systemic chemotherapy due to co-morbid illnesses and performance status. Thus I recommended Abiraterone.      On Abiraterone - started 8/17/2019  Tolerating treatment very well  Denies any side effects. A detailed system by system evaluation of side effect was performed to assess chemotherapy related toxicity. Blood counts are acceptable. Results reviewed with the patient    Symptom management form reviewed with patient.       2. Bone metastasis     Denosumab - awaiting dental clearance  Continue ADT - per Urology  On Abiraterone       3. Anemia from chronic disease    Observation  RBC transfusion as necessary      4. Morbid obesity    Unable to exercise  Asked to moderate calorie intake       Plan:        > Continue Abiraterone   > Continue ADT - per Urology  > Start Denosumab after dental clearance  > Follow-up in 1 month        Signed by: Luisa Etienne MD                     November 11, 2019        CC. Ira Dey MD  CC.  Diana Kovacs MD

## 2019-11-26 ENCOUNTER — TELEPHONE (OUTPATIENT)
Dept: PALLATIVE CARE | Age: 54
End: 2019-11-26

## 2019-11-26 NOTE — TELEPHONE ENCOUNTER
Called patient to advise/confirm upcoming appt with Dr. Devyn Briggs on 11/27/19     at 9:30am at [address] Pt confirmed. Also advised to please bring in your Drivers License and Insurance Card with you to appointment as well as any prescription pain medication in the original container with you to appt.

## 2019-11-26 NOTE — TELEPHONE ENCOUNTER
Fish Drake is checking to see if co - pay is due for appt visit 11/27/19. Patient states no co pay due, however according to Fish Wagner with [facility], co pay of $30.00 is due. Call ref:  [id number]. Will inform patient tomorrow.

## 2019-11-27 ENCOUNTER — TELEPHONE (OUTPATIENT)
Dept: PALLATIVE CARE | Age: 54
End: 2019-11-27

## 2019-11-27 NOTE — TELEPHONE ENCOUNTER
Pt called today around 9:47am stating that he had an appt with  at 9:30am but was unable to make it. He did not give a reason as to why he was not able to make it and he r/s for 12/11/19.

## 2019-12-02 ENCOUNTER — TELEPHONE (OUTPATIENT)
Dept: PALLATIVE CARE | Age: 54
End: 2019-12-02

## 2019-12-02 NOTE — TELEPHONE ENCOUNTER
Patient is calling regarding pain medication. Advised nurse would call him back to discuss. He is asking for higher dose.

## 2019-12-02 NOTE — TELEPHONE ENCOUNTER
Patient reports Maricarmen Downing is not working at all for him , , informs he does not want to go to ED for pain control reports Maricarmen Downing is causing hot flashes

## 2019-12-04 DIAGNOSIS — C61 PROSTATE CANCER (HCC): Primary | ICD-10-CM

## 2019-12-04 RX ORDER — OXYCODONE HYDROCHLORIDE 20 MG/1
20 TABLET ORAL
Qty: 60 TAB | Refills: 0 | Status: SHIPPED | OUTPATIENT
Start: 2019-12-04 | End: 2019-12-20 | Stop reason: SDUPTHER

## 2019-12-09 ENCOUNTER — TELEPHONE (OUTPATIENT)
Dept: PALLATIVE CARE | Age: 54
End: 2019-12-09

## 2019-12-09 NOTE — TELEPHONE ENCOUNTER
Returned call to Mr Blue Ortega verified [address]. He shared on this past Saturday he went to [facility] ED via ambulance. He stated he couldn't lift his right arm. He had pain to the right shoulder and back. He shared he had a CT Scan, MRI, and X-Ray done. He stated he will need to cancel his appointment on 12/11/19 at 8:30 AM at the [facility] office. He has a scheduled appointment the same day at 9:30 AM. As we were talking his phone started to die he said he'd need to call PM back. Message forwarded to team.    Patient called back states he had question regarding his medications. He stated he is only taking the oxycodone Ir 20 mg tab as needed takes 4 tabs a day. He wanted to know if he is to take the Xtampza 13 mg cap as as well. He shared he has not taken the medication.  I verified the AVS. Forwarded updated message to the team.

## 2019-12-09 NOTE — TELEPHONE ENCOUNTER
Patient has an appointment on Wednesday but would like to talk to provider before that. Please call for clarification.

## 2019-12-09 NOTE — TELEPHONE ENCOUNTER
Returned call to Mr Dali Forbes. I informed him I spoke with Dr Ela Amanda team. Was informed to make patient aware he is to keep scheduled appointment advised to bring all medications to appointment in original bottles. Also read the AVS summary to patient with instructions on how he is to take his opiod medications. I read back and confirmed he understood.

## 2019-12-11 ENCOUNTER — TELEPHONE (OUTPATIENT)
Dept: ONCOLOGY | Age: 54
End: 2019-12-11

## 2019-12-12 ENCOUNTER — DOCUMENTATION ONLY (OUTPATIENT)
Dept: PALLATIVE CARE | Age: 54
End: 2019-12-12

## 2019-12-12 NOTE — PROGRESS NOTES
Call placed to patient :     Patient states he cancelled appointment because weather was bad and he had ice on his steps and was afraid of falling. Patient has cancelled several appointment with PM and Oncology, recent call with in the week for pain not being controlled, from speaking with patient we have found he is not compliant in taking both long acting and short acting medications, patient was unaware he could take both, education given on proper directions to take medication , however LPN offered patient [place] services he reports he lives with his sister in her home and would have to ask her permission, he was reluctant as to what [place] services could offer him, patients past trauma with hip fracture and obesity and inability walk any distance with out wheel chair or crutch makes it taxing for him to leave the home or make his appointments as well as the need for medication reconciliation and education in the home and OT to help with ADL needs in home that can better aid his mobility in the community.  Patient verbalized understanding of nurse reporting he would call our office back later today if he wants to accept [place] services

## 2019-12-17 ENCOUNTER — HOSPITAL ENCOUNTER (EMERGENCY)
Age: 54
Discharge: HOME | End: 2019-12-17
Attending: EMERGENCY MEDICINE
Payer: MEDICARE

## 2019-12-17 ENCOUNTER — DOCUMENTATION ONLY (OUTPATIENT)
Dept: PALLATIVE CARE | Age: 54
End: 2019-12-17

## 2019-12-17 VITALS
TEMPERATURE: 98.4 F | BODY MASS INDEX: 39.76 KG/M2 | HEIGHT: 73 IN | WEIGHT: 300 LBS | OXYGEN SATURATION: 94 % | HEART RATE: 87 BPM | SYSTOLIC BLOOD PRESSURE: 146 MMHG | RESPIRATION RATE: 16 BRPM | DIASTOLIC BLOOD PRESSURE: 94 MMHG

## 2019-12-17 DIAGNOSIS — G89.3 CHRONIC PAIN DUE TO NEOPLASM: Primary | ICD-10-CM

## 2019-12-17 LAB
ALBUMIN SERPL-MCNC: 2.9 G/DL (ref 3.5–5)
ALBUMIN/GLOB SERPL: 0.6 {RATIO} (ref 1.1–2.2)
ALP SERPL-CCNC: 662 U/L (ref 45–117)
ALT SERPL-CCNC: 16 U/L (ref 12–78)
ANION GAP SERPL CALC-SCNC: 6 MMOL/L (ref 5–15)
AST SERPL-CCNC: 38 U/L (ref 15–37)
ATRIAL RATE: 100 BPM
BASOPHILS # BLD: 0.1 K/UL (ref 0–0.1)
BASOPHILS NFR BLD: 1 % (ref 0–1)
BILIRUB SERPL-MCNC: 0.6 MG/DL (ref 0.2–1)
BUN SERPL-MCNC: 29 MG/DL (ref 6–20)
BUN/CREAT SERPL: 25 (ref 12–20)
CALCIUM SERPL-MCNC: 9.6 MG/DL (ref 8.5–10.1)
CALCULATED R AXIS, ECG10: -4 DEGREES
CALCULATED T AXIS, ECG11: 14 DEGREES
CHLORIDE SERPL-SCNC: 105 MMOL/L (ref 97–108)
CK SERPL-CCNC: 93 U/L (ref 39–308)
CO2 SERPL-SCNC: 26 MMOL/L (ref 21–32)
CREAT SERPL-MCNC: 1.18 MG/DL (ref 0.7–1.3)
DIAGNOSIS, 93000: NORMAL
DIFFERENTIAL METHOD BLD: ABNORMAL
EOSINOPHIL # BLD: 0.1 K/UL (ref 0–0.4)
EOSINOPHIL NFR BLD: 1 % (ref 0–7)
ERYTHROCYTE [DISTWIDTH] IN BLOOD BY AUTOMATED COUNT: 14.8 % (ref 11.5–14.5)
GLOBULIN SER CALC-MCNC: 4.5 G/DL (ref 2–4)
GLUCOSE SERPL-MCNC: 111 MG/DL (ref 65–100)
HCT VFR BLD AUTO: 37.8 % (ref 36.6–50.3)
HGB BLD-MCNC: 12.4 G/DL (ref 12.1–17)
IMM GRANULOCYTES # BLD AUTO: 0 K/UL (ref 0–0.04)
IMM GRANULOCYTES NFR BLD AUTO: 1 % (ref 0–0.5)
INR PPP: 1.7 (ref 0.9–1.1)
LYMPHOCYTES # BLD: 1.6 K/UL (ref 0.8–3.5)
LYMPHOCYTES NFR BLD: 26 % (ref 12–49)
MCH RBC QN AUTO: 28.8 PG (ref 26–34)
MCHC RBC AUTO-ENTMCNC: 32.8 G/DL (ref 30–36.5)
MCV RBC AUTO: 87.9 FL (ref 80–99)
MONOCYTES # BLD: 0.5 K/UL (ref 0–1)
MONOCYTES NFR BLD: 8 % (ref 5–13)
NEUTS SEG # BLD: 3.9 K/UL (ref 1.8–8)
NEUTS SEG NFR BLD: 63 % (ref 32–75)
NRBC # BLD: 0 K/UL (ref 0–0.01)
NRBC BLD-RTO: 0 PER 100 WBC
PLATELET # BLD AUTO: 247 K/UL (ref 150–400)
PMV BLD AUTO: 9.9 FL (ref 8.9–12.9)
POTASSIUM SERPL-SCNC: 3.8 MMOL/L (ref 3.5–5.1)
PROT SERPL-MCNC: 7.4 G/DL (ref 6.4–8.2)
PROTHROMBIN TIME: 16.8 SEC (ref 9–11.1)
Q-T INTERVAL, ECG07: 362 MS
QRS DURATION, ECG06: 70 MS
QTC CALCULATION (BEZET), ECG08: 433 MS
RBC # BLD AUTO: 4.3 M/UL (ref 4.1–5.7)
SODIUM SERPL-SCNC: 137 MMOL/L (ref 136–145)
TROPONIN I SERPL-MCNC: <0.05 NG/ML
VENTRICULAR RATE, ECG03: 86 BPM
WBC # BLD AUTO: 6.2 K/UL (ref 4.1–11.1)

## 2019-12-17 PROCEDURE — 84484 ASSAY OF TROPONIN QUANT: CPT

## 2019-12-17 PROCEDURE — 99285 EMERGENCY DEPT VISIT HI MDM: CPT

## 2019-12-17 PROCEDURE — 85025 COMPLETE CBC W/AUTO DIFF WBC: CPT

## 2019-12-17 PROCEDURE — 74011250637 HC RX REV CODE- 250/637: Performed by: NURSE PRACTITIONER

## 2019-12-17 PROCEDURE — 85610 PROTHROMBIN TIME: CPT

## 2019-12-17 PROCEDURE — 96375 TX/PRO/DX INJ NEW DRUG ADDON: CPT

## 2019-12-17 PROCEDURE — 96374 THER/PROPH/DIAG INJ IV PUSH: CPT

## 2019-12-17 PROCEDURE — 93005 ELECTROCARDIOGRAM TRACING: CPT

## 2019-12-17 PROCEDURE — 82550 ASSAY OF CK (CPK): CPT

## 2019-12-17 PROCEDURE — 36415 COLL VENOUS BLD VENIPUNCTURE: CPT

## 2019-12-17 PROCEDURE — 74011250636 HC RX REV CODE- 250/636: Performed by: NURSE PRACTITIONER

## 2019-12-17 PROCEDURE — 80053 COMPREHEN METABOLIC PANEL: CPT

## 2019-12-17 RX ORDER — HYDROMORPHONE HYDROCHLORIDE 1 MG/ML
1 INJECTION, SOLUTION INTRAMUSCULAR; INTRAVENOUS; SUBCUTANEOUS ONCE
Status: COMPLETED | OUTPATIENT
Start: 2019-12-17 | End: 2019-12-17

## 2019-12-17 RX ORDER — ONDANSETRON 2 MG/ML
4 INJECTION INTRAMUSCULAR; INTRAVENOUS
Status: COMPLETED | OUTPATIENT
Start: 2019-12-17 | End: 2019-12-17

## 2019-12-17 RX ORDER — METHOCARBAMOL 750 MG/1
750 TABLET, FILM COATED ORAL 4 TIMES DAILY
Qty: 20 TAB | Refills: 0 | Status: SHIPPED | OUTPATIENT
Start: 2019-12-17

## 2019-12-17 RX ORDER — OXYCODONE HYDROCHLORIDE 5 MG/1
30 TABLET ORAL
Status: COMPLETED | OUTPATIENT
Start: 2019-12-17 | End: 2019-12-17

## 2019-12-17 RX ORDER — GABAPENTIN 100 MG/1
100 CAPSULE ORAL 3 TIMES DAILY
Qty: 30 CAP | Refills: 0 | Status: SHIPPED | OUTPATIENT
Start: 2019-12-17 | End: 2019-12-27

## 2019-12-17 RX ADMIN — OXYCODONE 30 MG: 5 TABLET ORAL at 16:09

## 2019-12-17 RX ADMIN — SODIUM CHLORIDE 500 ML: 900 INJECTION, SOLUTION INTRAVENOUS at 12:41

## 2019-12-17 RX ADMIN — ONDANSETRON 4 MG: 2 INJECTION INTRAMUSCULAR; INTRAVENOUS at 12:35

## 2019-12-17 RX ADMIN — HYDROMORPHONE HYDROCHLORIDE 1 MG: 1 INJECTION, SOLUTION INTRAMUSCULAR; INTRAVENOUS; SUBCUTANEOUS at 12:35

## 2019-12-17 RX ADMIN — HYDROMORPHONE HYDROCHLORIDE 1 MG: 1 INJECTION, SOLUTION INTRAMUSCULAR; INTRAVENOUS; SUBCUTANEOUS at 14:59

## 2019-12-17 NOTE — DISCHARGE INSTRUCTIONS
Patient Education        Learning About Cancer Pain  What is cancer pain? Cancer pain may be caused by the cancer or by the treatments and tests used. The pain may make it hard for you to do your normal activities, such as sleeping or eating. Over time, cancer pain can cause appetite and sleep problems, isolation, and depression. But most cancer pain can be managed with medicines and other methods. This may not mean that you have no pain but that it stays at a level that you can bear. Treating your pain will make you feel better. You will be more active, eat and sleep better, and enjoy your family and friends. What are some key points about cancer pain? · You are the only person who can say how much pain you have. If you tell your doctor when you have pain or when pain changes, your doctor can help you. · Cancer pain can almost always be relieved if you work with your doctor to create a treatment plan that is right for you. · Pain is often easier to control right after it starts. This may mean it is better to take regular doses instead of waiting until the pain becomes bad. · Take your medicines exactly as prescribed. Call your doctor if you think you are having a problem with your medicine. · You may find that taking your medicine works most of the time, but your pain flares up during extra activity or for no clear reason. This is called breakthrough pain. Ask your doctor what you can do if this happens. Your doctor can give you a prescription for fast-acting medicines that you can take for breakthrough pain. · People who take opioid pain medicines for cancer pain rarely develop opioid use disorder. Moderate to severe opioid use disorder is sometimes called addiction. Your body may come to expect daily doses of medicine to control the pain. But your doctor can gradually lower the amount you are taking when and if the cause of your pain is gone. What treatments can help you manage cancer pain?   Medical treatments to manage cancer pain include:  · Prescription and over-the-counter medicines. Many types of medicines are used. Your doctor may suggest different combinations of medicines. · Surgery, chemotherapy, radiation, and hormone therapy. These may be used to remove or destroy a tumor that is causing pain. · Nerve blocks. These are used to help with nerve pain. A medicine is injected into the nerve that affects the painful area. Nonmedical treatments include:  · Physical therapy, gentle massage, acupuncture, and heat or cold to ease pain. · Stretching, yoga, and exercises to help you keep your strength, flexibility, and mobility. · Relaxation, biofeedback, or meditation to relieve stress and anxiety. · Short-term crisis therapy with a counselor. This may help you manage your cancer pain or the discomfort from cancer treatments. · Education and emotional support. Learning as much as you can about your pain may help. So can sharing your feelings with others. A support group can be a safe and comfortable place to talk about your illness. · Complementary therapies, such as aromatherapy, prayer, and humor therapy, may be helpful. How can you manage cancer pain? Your doctor needs all the information you can give about what your pain feels like. It often helps to write things down in a pain diary. · Write down when your pain starts, what it feels like, and how long it lasts. Use words like dull, aching, sharp, shooting, throbbing, or burning. · Note changes in your pain. Is it constant, or does it come and go? Do you have more than one kind of pain? How long does it last?  · Rate your pain on a scale of 0 to 10, with 0 being no pain and 10 being the worst pain you can imagine. · Write exactly where you feel pain. You can use a drawing. Say whether the pain is just in that one place or several places at once. Or tell your doctor if it travels from one place to another.   · Write down what makes your pain better or worse. Note when you used a treatment, how well it worked, and any side effects. If you and your doctor are not able to control your pain, ask about seeing a pain specialist. A pain specialist is a health professional who focuses on treating resistant pain. Talk to your doctor if you are having problems with depression. Treating depression can make it easier to manage your cancer pain. Where can you learn more? Go to http://lindsey-mono.info/. Enter K531 in the search box to learn more about \"Learning About Cancer Pain. \"  Current as of: December 19, 2018  Content Version: 12.2  © 1015-5868 [employer]. Care instructions adapted under license by [employer] (which disclaims liability or warranty for this information). If you have questions about a medical condition or this instruction, always ask your healthcare professional. [address] any warranty or liability for your use of this information. We hope that we have addressed all of your medical concerns. The examination and treatment you received in the Emergency Department were for an emergent problem and were not intended as complete care. It is important that you follow up with your healthcare provider(s) for ongoing care. If your symptoms worsen or do not improve as expected, and you are unable to reach your usual health care provider(s), you should return to the Emergency Department. Rowan Waldrop participate in nationally recognized quality of care measures. If your blood pressure is greater than 120/80, as reported below, we urge that you seek medical care to address the potential of high blood pressure, commonly known as hypertension. Hypertension can be hereditary or can be caused by certain medical conditions, pain, stress, or \"white coat syndrome. \"       Please make an appointment with your health care provider(s) for follow up of your Emergency Department visit. VITALS:   Patient Vitals for the past 8 hrs:   Temp Pulse Resp BP SpO2   12/17/19 1708 98.4 °F (36.9 °C) 87 16 (!) 146/94 94 %   12/17/19 1612 98.3 °F (36.8 °C) 78 16 (!) 146/100 99 %   12/17/19 1320 98.6 °F (37 °C) 74 16 (!) 121/94 99 %   12/17/19 1149 -- -- -- -- 97 %   12/17/19 1135 98.2 °F (36.8 °C) 92 18 (!) 153/96 97 %          Thank you for allowing us to provide you with medical care today. We realize that you have many choices for your emergency care needs. Please choose us in the future for any continued health care needs. Manuelito Leonard NP            Recent Results (from the past 24 hour(s))   EKG, 12 LEAD, INITIAL    Collection Time: 12/17/19 12:22 PM   Result Value Ref Range    Ventricular Rate 86 BPM    Atrial Rate 100 BPM    QRS Duration 70 ms    Q-T Interval 362 ms    QTC Calculation (Bezet) 433 ms    Calculated R Axis -4 degrees    Calculated T Axis 14 degrees    Diagnosis       Atrial fibrillation  Cannot rule out Anterior infarct , age undetermined  When compared with ECG of 17-MAY-2019 21:55,  Atrial fibrillation has replaced Sinus rhythm     METABOLIC PANEL, COMPREHENSIVE    Collection Time: 12/17/19 12:25 PM   Result Value Ref Range    Sodium 137 136 - 145 mmol/L    Potassium 3.8 3.5 - 5.1 mmol/L    Chloride 105 97 - 108 mmol/L    CO2 26 21 - 32 mmol/L    Anion gap 6 5 - 15 mmol/L    Glucose 111 (H) 65 - 100 mg/dL    BUN 29 (H) 6 - 20 MG/DL    Creatinine 1.18 0.70 - 1.30 MG/DL    BUN/Creatinine ratio 25 (H) 12 - 20      GFR est AA >60 >60 ml/min/1.73m2    GFR est non-AA >60 >60 ml/min/1.73m2    Calcium 9.6 8.5 - 10.1 MG/DL    Bilirubin, total 0.6 0.2 - 1.0 MG/DL    ALT (SGPT) 16 12 - 78 U/L    AST (SGOT) 38 (H) 15 - 37 U/L    Alk.  phosphatase 662 (H) 45 - 117 U/L    Protein, total 7.4 6.4 - 8.2 g/dL    Albumin 2.9 (L) 3.5 - 5.0 g/dL    Globulin 4.5 (H) 2.0 - 4.0 g/dL    A-G Ratio 0.6 (L) 1.1 - 2.2     CBC WITH AUTOMATED DIFF Collection Time: 12/17/19 12:25 PM   Result Value Ref Range    WBC 6.2 4.1 - 11.1 K/uL    RBC 4.30 4. 10 - 5.70 M/uL    HGB 12.4 12.1 - 17.0 g/dL    HCT 37.8 36.6 - 50.3 %    MCV 87.9 80.0 - 99.0 FL    MCH 28.8 26.0 - 34.0 PG    MCHC 32.8 30.0 - 36.5 g/dL    RDW 14.8 (H) 11.5 - 14.5 %    PLATELET 186 108 - 826 K/uL    MPV 9.9 8.9 - 12.9 FL    NRBC 0.0 0  WBC    ABSOLUTE NRBC 0.00 0.00 - 0.01 K/uL    NEUTROPHILS 63 32 - 75 %    LYMPHOCYTES 26 12 - 49 %    MONOCYTES 8 5 - 13 %    EOSINOPHILS 1 0 - 7 %    BASOPHILS 1 0 - 1 %    IMMATURE GRANULOCYTES 1 (H) 0.0 - 0.5 %    ABS. NEUTROPHILS 3.9 1.8 - 8.0 K/UL    ABS. LYMPHOCYTES 1.6 0.8 - 3.5 K/UL    ABS. MONOCYTES 0.5 0.0 - 1.0 K/UL    ABS. EOSINOPHILS 0.1 0.0 - 0.4 K/UL    ABS. BASOPHILS 0.1 0.0 - 0.1 K/UL    ABS. IMM. GRANS. 0.0 0.00 - 0.04 K/UL    DF AUTOMATED     TROPONIN I    Collection Time: 12/17/19 12:25 PM   Result Value Ref Range    Troponin-I, Qt. <0.05 <0.05 ng/mL   CK    Collection Time: 12/17/19 12:25 PM   Result Value Ref Range    CK 93 39 - 308 U/L   PROTHROMBIN TIME + INR    Collection Time: 12/17/19 12:25 PM   Result Value Ref Range    INR 1.7 (H) 0.9 - 1.1      Prothrombin time 16.8 (H) 9.0 - 11.1 sec       No results found.

## 2019-12-17 NOTE — ED NOTES
Discharge instructions given. Pt and spouse verbalize understanding. Denies questions or concerns. Pt waiting on wheelchair assistance to car.

## 2019-12-17 NOTE — ED TRIAGE NOTES
Pt reports hip and back pain that started one month ago. Denies any new injury or falls. Pt is nonambulatory and has a history of bilateral hip replacements.

## 2019-12-17 NOTE — PROGRESS NOTES
Phone call with ER NP regarding patient    Mr. Mayda Deleon is currently in the emergency room complaining of worsening bony pain. Recommended that provider investigate etiology of acute on chronic pain. There is significant concern that the patient is not taking medications as directed. Okay to increase oxycodone 20 mg to every 3 hours as needed for acute pain while continuing Xtampsa 2 times a day.     -I will see him tomorrow in clinic at 11:30 AM

## 2019-12-17 NOTE — PROGRESS NOTES
Spiritual Care Assessment/Progress Note  [facility]      NAME: Lou Mills      MRN: [id number]  AGE: 62 y.o. SEX: male  Congregational Affiliation: Non Lutheran   Language: English     12/17/2019     Total Time (in minutes): 19     Spiritual Assessment begun in [facility] EMERGENCY DEPT through conversation with:         [x]Patient        [] Family    [] Friend(s)        Reason for Consult: Palliative Care, Initial/Spiritual Assessment     Spiritual beliefs: (Please include comment if needed)     [] Identifies with a babita tradition:         [] Supported by a babita community:            [] Claims no spiritual orientation:           [] Seeking spiritual identity:                [] Adheres to an individual form of spirituality:           [x] Not able to assess:                           Identified resources for coping:      [] Prayer                               [] Music                  [] Guided Imagery     [] Family/friends                 [] Pet visits     [] Devotional reading                         [x] Unknown     [] Other:                                              Interventions offered during this visit: (See comments for more details)    Patient Interventions: Prayer (assurance of)           Plan of Care:     [] Support spiritual and/or cultural needs    [] Support AMD and/or advance care planning process      [] Support grieving process   [] Coordinate Rites and/or Rituals    [] Coordination with community clergy   [x] No spiritual needs identified at this time   [] Detailed Plan of Care below (See Comments)  [] Make referral to Music Therapy  [] Make referral to Pet Therapy     [] Make referral to Addiction services  [] Make referral to [facility]  [] Make referral to Spiritual Care Partner  [] No future visits requested        [x] Follow up visits as needed     Comments: The patient was located in the area designated as [facility] which is located in the ED.  The patient was resting in the bed. The patient did not have visitors. After providing an introduction, the patient did not respond. When asked about spiritual support, the patient declined. Advised of  availability. Chaplains will follow as able and/or needed. Rev.  Lopez Maher EdD MDiv     For  Assistance Page [phone]

## 2019-12-20 DIAGNOSIS — C61 PROSTATE CANCER (HCC): ICD-10-CM

## 2019-12-20 RX ORDER — OXYCODONE HYDROCHLORIDE 20 MG/1
20 TABLET ORAL
Qty: 18 TAB | Refills: 0 | Status: SHIPPED | OUTPATIENT
Start: 2019-12-20 | End: 2019-12-24

## 2019-12-20 NOTE — TELEPHONE ENCOUNTER
Returned call to patient ,Per Dr Mcdonough Sole refill for 3 day supply yo patients local [facility] pharmacy,patient is aware of appointment scheduled for Monday 12/23/19 and patient aware appointment has to be kept for any further refills.  Patient verbalized understanding

## 2019-12-20 NOTE — TELEPHONE ENCOUNTER
Pt called and asked to get a refill on his oxycodone. Ananda Pandya He also said that the 100 mg gabapentin that he received in his hospital stay is not working.  He can be reached at [phone]

## 2019-12-21 NOTE — ED PROVIDER NOTES
EMERGENCY DEPARTMENT HISTORY AND PHYSICAL EXAM      Date: 12/17/2019  Patient Name: Shellie Webb    History of Presenting Illness     Chief Complaint   Patient presents with    Hip Pain    Back Pain       History Provided By: Patient    HPI: Shellie Webb, 62 y.o. male presents to the ED with cc of bilateral shoulder, neck, back, and bilateral hip pain that has been progressive. No new fall injury or trauma. Pt has chronic pain related to metastatic prostate cancer. Followed by urology, onc, and palliative care. Currently prescribed Xrampsa BID and oxycodone 20 mg q4h for breakthrough. He reports missing his last palliative care, urology, and oncology appts. He requests that his chemo medications be administered while he is in there ER. He also requests that his palliative care physician and primary care physician be notified so that they may assess him in the ER. He was last seen in [facility] five days ago for the same complaint. MRI and serial xrays were obtained. No acute pathology was identified. He was referred to the ortho for possible rotator cuff injury of the right shoulder. Do to pain, he has been lying in bed for the last several days. Pt denies fevers, chills, night sweats, chest pain, pressure, SOB, HICKS, PND, orthopnea, abdominal pain, n/v/d, melena, hematuria, dysuria, constipation, HA, dizziness, and syncope      There are no other complaints, changes, or physical findings at this time. PCP: Ynaeth Willis MD    No current facility-administered medications on file prior to encounter. Current Outpatient Medications on File Prior to Encounter   Medication Sig Dispense Refill    [DISCONTINUED] oxyCODONE IR (ROXICODONE) 20 mg immediate release tablet Take 1 Tab by mouth every six (6) hours as needed for Pain for up to 15 days. Max Daily Amount: 80 mg. 60 Tab 0    naloxone (NARCAN) 4 mg/actuation nasal spray Use 1 spray intranasally, then discard.  Repeat with new spray every 2 min as needed for opioid overdose symptoms, alternating nostrils. 2 Each 1    predniSONE (DELTASONE) 5 mg tablet Take 1 Tab by mouth two (2) times a day. 60 Tab 3    losartan (COZAAR) 25 mg tablet Take  by mouth daily.  furosemide (LASIX) 40 mg tablet Take 40 mg by mouth as needed.  abiraterone (ZYTIGA) 500 mg tab Take 1,000 mg by mouth daily. 60 Tab 11    warfarin (COUMADIN) 4 mg tablet Pt is doing 5mg, due for recheck 9/13/19      metoprolol succinate (TOPROL-XL) 100 mg tablet Take 1 Tab by mouth daily. 30 Tab 0    VENTOLIN HFA 90 mcg/actuation inhaler Take 1 Puff by inhalation every four (4) hours as needed for Wheezing or Shortness of Breath. 1 Inhaler 0    fluticasone propionate (FLONASE) 50 mcg/actuation nasal spray 2 Sprays by Both Nostrils route daily. 1 Bottle 0    albuterol (PROVENTIL VENTOLIN) 2.5 mg /3 mL (0.083 %) nebulizer solution 2.5 mg by Nebulization route every four (4) hours as needed.  atorvastatin (LIPITOR) 40 mg tablet Take 40 mg by mouth daily.  ipratropium (ATROVENT) 0.02 % soln 0.5 mg every four (4) hours as needed.  bicalutamide (CASODEX) 50 mg tablet Take 50 mg by mouth daily. Past History     Past Medical History:  Past Medical History:   Diagnosis Date    Atrial fibrillation ([facility]     CHF (congestive heart failure) (HCC)     Chronic obstructive pulmonary disease (HCC)     Diabetes ([facility]     pre-diabetic on metformin    Hypertension     Prostate enlargement        Past Surgical History:  Past Surgical History:   Procedure Laterality Date    HX HERNIA REPAIR      ventral and inguinal    HX ORTHOPAEDIC      hip surgery       Family History:  History reviewed. No pertinent family history. Social History:  Social History     Tobacco Use    Smoking status: Never Smoker    Smokeless tobacco: Never Used   Substance Use Topics    Alcohol use:  Yes     Alcohol/week: 3.0 standard drinks     Types: 3 Cans of beer per week     Frequency: Never Comment: on weekends only    Drug use: Never       Allergies: Allergies   Allergen Reactions    Penicillins Rash         Review of Systems   Review of Systems   Constitutional: Negative for activity change, appetite change, chills, diaphoresis, fatigue, fever and unexpected weight change. HENT: Negative for congestion, ear pain, rhinorrhea, sinus pressure, sore throat, tinnitus, trouble swallowing and voice change. Eyes: Negative for pain, discharge, redness and visual disturbance. Respiratory: Negative for apnea, cough, choking, chest tightness, shortness of breath, wheezing and stridor. Cardiovascular: Negative for chest pain, palpitations and leg swelling. Gastrointestinal: Negative for abdominal pain, constipation, nausea and vomiting. Endocrine: Negative for cold intolerance and heat intolerance. Genitourinary: Negative for difficulty urinating, dysuria, flank pain, hematuria, testicular pain and urgency. Musculoskeletal: Positive for arthralgias, back pain, myalgias and neck pain. Negative for gait problem, joint swelling and neck stiffness. Skin: Negative for color change, pallor, rash and wound. Allergic/Immunologic: Negative for immunocompromised state. Neurological: Negative for dizziness, tremors, syncope, weakness, light-headedness, numbness and headaches. Hematological: Does not bruise/bleed easily. Psychiatric/Behavioral: Negative for agitation, confusion and suicidal ideas. Physical Exam   Physical Exam  Vitals signs and nursing note reviewed. Constitutional:       General: He is not in acute distress. Appearance: He is well-developed. He is not diaphoretic. HENT:      Head: Atraumatic. Nose: Nose normal.      Mouth/Throat:      Pharynx: No oropharyngeal exudate. Eyes:      General: No scleral icterus. Right eye: No discharge. Left eye: No discharge.       Conjunctiva/sclera: Conjunctivae normal.   Neck:      Musculoskeletal: Normal range of motion and neck supple. Thyroid: No thyromegaly. Vascular: No JVD. Trachea: No tracheal deviation. Cardiovascular:      Rate and Rhythm: Normal rate and regular rhythm. Heart sounds: No murmur. No friction rub. No gallop. Pulmonary:      Effort: No respiratory distress. Breath sounds: Normal breath sounds. No stridor. No wheezing or rales. Chest:      Chest wall: No tenderness. Abdominal:      General: Bowel sounds are normal. There is no distension. Palpations: Abdomen is soft. There is no mass. Tenderness: There is no tenderness. There is no guarding or rebound. Musculoskeletal:         General: No signs of injury. Lymphadenopathy:      Cervical: No cervical adenopathy. Skin:     General: Skin is warm and dry. Neurological:      Mental Status: He is alert and oriented to person, place, and time. Coordination: Coordination normal.   Psychiatric:         Behavior: Behavior normal.         Diagnostic Study Results     Labs -  Lab Results   Component Value Date/Time    Sodium 137 12/17/2019 12:25 PM    Potassium 3.8 12/17/2019 12:25 PM    Chloride 105 12/17/2019 12:25 PM    CO2 26 12/17/2019 12:25 PM    Anion gap 6 12/17/2019 12:25 PM    Glucose 111 (H) 12/17/2019 12:25 PM    BUN 29 (H) 12/17/2019 12:25 PM    Creatinine 1.18 12/17/2019 12:25 PM    BUN/Creatinine ratio 25 (H) 12/17/2019 12:25 PM    GFR est AA >60 12/17/2019 12:25 PM    GFR est non-AA >60 12/17/2019 12:25 PM    Calcium 9.6 12/17/2019 12:25 PM    Bilirubin, total 0.6 12/17/2019 12:25 PM    AST (SGOT) 38 (H) 12/17/2019 12:25 PM    Alk.  phosphatase 662 (H) 12/17/2019 12:25 PM    Protein, total 7.4 12/17/2019 12:25 PM    Albumin 2.9 (L) 12/17/2019 12:25 PM    Globulin 4.5 (H) 12/17/2019 12:25 PM    A-G Ratio 0.6 (L) 12/17/2019 12:25 PM    ALT (SGPT) 16 12/17/2019 12:25 PM     Lab Results   Component Value Date/Time    WBC 6.2 12/17/2019 12:25 PM    HGB 12.4 12/17/2019 12:25 PM    HCT 37.8 12/17/2019 12:25 PM    PLATELET 156 50/66/1668 12:25 PM    MCV 87.9 12/17/2019 12:25 PM     Lab Results   Component Value Date/Time    INR 1.7 (H) 12/17/2019 12:25 PM    Prothrombin time 16.8 (H) 12/17/2019 12:25 PM       Radiologic Studies -   No orders to display     CT Results  (Last 48 hours)    None        CXR Results  (Last 48 hours)    None          Medical Decision Making   I am the first provider for this patient. I reviewed the vital signs, available nursing notes, past medical history, past surgical history, family history and social history. Vital Signs-Reviewed the patient's vital signs. Visit Vitals  BP (!) 146/94 (BP 1 Location: Right arm, BP Patient Position: Supine)   Pulse 87   Temp 98.4 °F (36.9 °C)   Resp 16   Ht 6' 1\" (1.854 m)   Wt 136.1 kg (300 lb)   SpO2 94%   BMI 39.58 kg/m²     Records Reviewed: Nursing Notes, Old Medical Records, Previous electrocardiograms, Previous Radiology Studies and Previous Laboratory Studies    Provider Notes (Medical Decision Making):   Acute on chronic neoplastic pain     Routine laboratory data and UA  IVF  Analgesia  Consult to onc and palliative care    Concern for new onset pathological fracture in the setting of mets. Pt was seen at [facility] five days prior with serial xrs and MRI that were found to be unremarkable for acute process. CONSULT NOTE:   [address], NP spoke with NP with Leanne Dominguez MD,   Specialty: Onc  Discussed pt's hx, disposition, and available diagnostic and imaging results. Reviewed care plans. Consultant agrees with plans as outlined. Pt receives ADT with urology. Please instruct pt to keep urology appts for management of therapy. No injection medication available in the ER. Corina Alas NP     CONSULT NOTE:   [address] NP spoke with Dr. Adenike Kovacs MD,   Specialty: Palliative care  Discussed pt's hx, disposition, and available diagnostic and imaging results. Reviewed care plans. Consultant agrees with plans as outlined. Follow up in clinic the following morning at 1130. Increase frequency of oxycodone. Stephie Griffin NP      ED Course:   Initial assessment performed. The patients presenting problems have been discussed, and they are in agreement with the care plan formulated and outlined with them. I have encouraged them to ask questions as they arise throughout their visit. Hemodynamically stable pt in NAD. Wife verbalizes complications with taking pt to appts because pt does not want to attend due to pain with ambulation. Adherence to medical management plan stressed. Trial kaiser and robaxin to augment pain management plan. Critical Care Time:   0    Disposition:  Discharge to home with Palliative care follow up     PLAN:  1. Discharge Medication List as of 12/17/2019  5:13 PM      START taking these medications    Details   methocarbamol (ROBAXIN-750) 750 mg tablet Take 1 Tab by mouth four (4) times daily. , Normal, Disp-20 Tab, R-0      gabapentin (NEURONTIN) 100 mg capsule Take 1 Cap by mouth three (3) times daily for 10 days. Max Daily Amount: 300 mg., Normal, Disp-30 Cap, R-0         CONTINUE these medications which have NOT CHANGED    Details   oxyCODONE IR (ROXICODONE) 20 mg immediate release tablet Take 1 Tab by mouth every six (6) hours as needed for Pain for up to 15 days. Max Daily Amount: 80 mg., Normal, Disp-60 Tab, R-0      naloxone (NARCAN) 4 mg/actuation nasal spray Use 1 spray intranasally, then discard. Repeat with new spray every 2 min as needed for opioid overdose symptoms, alternating nostrils. , Normal, Disp-2 Each, R-1      predniSONE (DELTASONE) 5 mg tablet Take 1 Tab by mouth two (2) times a day., Normal, Disp-60 Tab, R-3      losartan (COZAAR) 25 mg tablet Take  by mouth daily. , Historical Med      furosemide (LASIX) 40 mg tablet Take 40 mg by mouth as needed., Historical Med      abiraterone (ZYTIGA) 500 mg tab Take 1,000 mg by mouth daily. , Normal, Disp-60 Tab, R-11      warfarin (COUMADIN) 4 mg tablet Pt is doing 5mg, due for recheck 9/13/19, Historical Med      metoprolol succinate (TOPROL-XL) 100 mg tablet Take 1 Tab by mouth daily. , Print, Disp-30 Tab, R-0      VENTOLIN HFA 90 mcg/actuation inhaler Take 1 Puff by inhalation every four (4) hours as needed for Wheezing or Shortness of Breath., Print, Disp-1 Inhaler, R-0, DOT      fluticasone propionate (FLONASE) 50 mcg/actuation nasal spray 2 Sprays by Both Nostrils route daily. , Print, Disp-1 Bottle, R-0      albuterol (PROVENTIL VENTOLIN) 2.5 mg /3 mL (0.083 %) nebulizer solution 2.5 mg by Nebulization route every four (4) hours as needed., Historical Med      atorvastatin (LIPITOR) 40 mg tablet Take 40 mg by mouth daily. , Historical Med      ipratropium (ATROVENT) 0.02 % soln 0.5 mg every four (4) hours as needed., Historical Med      bicalutamide (CASODEX) 50 mg tablet Take 50 mg by mouth daily. , Historical Med           2. Follow-up Information     Follow up With Specialties Details Why Contact Info    Qiana Toribio MD Internal Medicine In 2 days  [place] [address] [phone]      Lorrie Hassan MD Palliative Medicine In 1 day 11:30 am  [address] [facility] [address]  [phone]      [facility] EMERGENCY DEPT Emergency Medicine  As needed, If symptoms worsen [address] [place] [address]  [address] [facility]  [phone]        Return to ED if worse     Diagnosis     Clinical Impression:   1. Chronic pain due to neoplasm        Attestations:    Jae Lundberg NP    Please note that this dictation was completed with [employer], the [employer] voice recognition software. Quite often unanticipated grammatical, syntax, homophones, and other interpretive errors are inadvertently transcribed by the computer software. Please disregard these errors. Please excuse any errors that have escaped final proofreading. Thank you.

## 2019-12-23 ENCOUNTER — DOCUMENTATION ONLY (OUTPATIENT)
Dept: PALLATIVE CARE | Age: 54
End: 2019-12-23

## 2019-12-23 ENCOUNTER — TELEPHONE (OUTPATIENT)
Dept: PALLATIVE CARE | Age: 54
End: 2019-12-23

## 2019-12-23 NOTE — TELEPHONE ENCOUNTER
Per Dr Shaneka Mcwilliams, multiple attempts have been made to reschedule pt for  missed and office visits. Pt missed todays office visit. Called pt at home he stated he was in too much pain to get out of bed and come in to be seen. Per Dr Shaneka Mcwilliams, pt will is not to have refills given on meds until he returns to an office visit.

## 2019-12-23 NOTE — TELEPHONE ENCOUNTER
Called patient since he was a no show. Patient states he is in too much pain to come in, states he has been in bed since coming home from hospital.  Advised what MD states we can see him on  12/26-19, however he states he cannot come in.   Advised MD.

## 2019-12-26 ENCOUNTER — TELEPHONE (OUTPATIENT)
Dept: ONCOLOGY | Age: 54
End: 2019-12-26

## 2019-12-26 NOTE — TELEPHONE ENCOUNTER
Received a call from Dr. Prabhakar Cadena. Patient is experiencing acute weakness in the left arm. Imaging reveals compromise of the spinal cord at various levels from C to T spine. After discussions, I felt that he would be best suited to have this acute episode taken care of at [facility] with Neurosurgical and Rad-Onc consultation.

## 2019-12-27 ENCOUNTER — TELEPHONE (OUTPATIENT)
Dept: PALLATIVE CARE | Age: 54
End: 2019-12-27

## 2019-12-27 NOTE — TELEPHONE ENCOUNTER
Patient called to say he has been in [address] [place] [address] since 12-25-19. Wanted us to know. Please call.

## 2019-12-27 NOTE — TELEPHONE ENCOUNTER
Palliative Medicine  Nursing Note  [phone]  Fax [phone]      Telephone Call  Patient Name: Jamar Minor  YOB: 1965    12/27/2019        Primary Decision Maker: Aminata Gross Partner - [phone]    Secondary Decision Maker: Tash Matthews - [phone]   Advance Care Planning 11/11/2019   Patient's Healthcare Decision Maker is: Named in scanned ACP document   Confirm Advance Directive Yes, on file     Returned call to Mr. Joseline Pavon. Spoke with a female that answered his mobile phone who shared that Mr. Joseline Pavon is currently in the hospital at [facility]. She said that, \"the cancer has spread up his back and he will be getting radiation for it, he is in a lot of pain, but they are working to make it better. \"  They wanted Dr. Brian Shore to know. Per Dr. Marianela Jimenez note from yesterday 12/26/19, \" received a call from Dr. Keshawn Gallo. Patient is experiencing acute weakness in the left arm. Imaging reveals compromise of the spinal cord at various levels from C to T spine. After discussions, I felt that he would be best suited to have this acute episode taken care of at [facility] with Neurosurgical and Rad-Onc consultation. \"     Dr. Brian Shore aware.     Emily Nichole, RN  Palliative Medicine

## 2020-01-17 ENCOUNTER — DOCUMENTATION ONLY (OUTPATIENT)
Dept: ONCOLOGY | Age: 55
End: 2020-01-17

## 2020-01-20 ENCOUNTER — TELEPHONE (OUTPATIENT)
Dept: PALLATIVE CARE | Age: 55
End: 2020-01-20

## 2020-01-20 DIAGNOSIS — C61 PROSTATE CANCER METASTATIC TO MULTIPLE SITES (HCC): Primary | ICD-10-CM

## 2020-01-20 RX ORDER — ABIRATERONE 500 MG/1
1000 TABLET ORAL DAILY
Qty: 60 TAB | Refills: 11 | Status: SHIPPED | OUTPATIENT
Start: 2020-01-20 | End: 2020-02-14 | Stop reason: SDUPTHER

## 2020-01-20 NOTE — TELEPHONE ENCOUNTER
Miguelina Faulkner from [facility] called and stated that the patient has been in pain and the DrLibra There would like to speak with Damari Mason about his care. . She can be reached at  [phone] ex.  [address]

## 2020-01-20 NOTE — TELEPHONE ENCOUNTER
Received Privacy Waiver and Consent from [facility] regarding Mr. Coello Sep. Dr. Sangeetha Aguirre last H&P, and chart note, faxed to them at [phone].     Lori Pierce RN  Palliative Medicine

## 2020-01-20 NOTE — TELEPHONE ENCOUNTER
After lengthly conversation with insurance company/[employer] it was noted Lorriane Large is no longer approved so need to be on  generic brand of \"ZYTIGA\". Abiraterone 500mg tab.

## 2020-01-20 NOTE — TELEPHONE ENCOUNTER
Palliative Medicine  Nursing Note  [phone])  Fax [phone]      Telephone Call  Patient Name: Elenita Santoyo  YOB: 1965    1/20/2020        Primary Decision Maker: Satya Love - [phone]    Secondary Decision Maker: Ivette Tripp - [phone]   Advance Care Planning 11/11/2019   Patient's Healthcare Decision Maker is: Named in scanned ACP document   Confirm Advance Directive Yes, on file     Call returned to Ms. Stephens at [facility]. She stated that the NP there is wanting Dr. Jaskaran Valladares last H&P, medication list etc to help provide continuity of care and maintain pain management. Patient has been complaining of increased pain and mentioned that Dr. Sharyle Marvel had him on increased doses of pain medication. He is at [facility] for rehab with the goal of returning home. He was discharged from [facility] to there. He is currently on Oxycodone 20mg every 4 hours with the availability of 5 extra doses every 2 hours. Palliative requested for a release of information to be faxed prior to sending information. Josie Swain verbalized understanding and will be able to do that. Dr. Sharyle Marvel informed of the above.     Adolph Matthews RN  Palliative Medicine

## 2020-01-27 ENCOUNTER — TELEPHONE (OUTPATIENT)
Dept: MAMMOGRAPHY | Age: 55
End: 2020-01-27

## 2020-01-27 NOTE — TELEPHONE ENCOUNTER
Mr. Titi Gonzales called me stating that he received a call from a number which called back to my phone. Mr. Titi Gonzales gave me his , explained that there not a note in system where a call to him and he was not aware of any radi[facility] [address]

## 2020-01-29 ENCOUNTER — DOCUMENTATION ONLY (OUTPATIENT)
Dept: ONCOLOGY | Age: 55
End: 2020-01-29

## 2020-01-29 NOTE — PROGRESS NOTES
Pt is on generic zytgia now due to insurance reasons. This was shipped to patient on 1/6/19 and received on 1/7/2020.   He said a steve will be coming to store to pay for the drug which is little over $8.00

## 2020-02-01 ENCOUNTER — DOCUMENTATION ONLY (OUTPATIENT)
Dept: ONCOLOGY | Age: 55
End: 2020-02-01

## 2020-02-01 NOTE — PROGRESS NOTES
[employer]  Social Work Navigator Encounter     Patient Name:  Cheyenne Scruggs    Medical History: dx metastatic prostate cancer     Advance Directives: on file     Narrative: SW called pt to inquire about not showing to the appt. Pt states he has just gotten out of hospital ([facility] Dec 26-Jan3rd) [facility]. It appears home health is present. [facility] to understand his status. Pt has not been to a dentist.      Barriers to Care:     Assessment/Action: It needs to be determined if the patient is able to come into [facility] for treatment. PT asked SW to see about getting treatment for him in his home. Plan/Referral:     Home Health referral  Pt states he cannot stand/walk/ or should not put weight on feet.       Primary Care referral   Need to determine who needs to order home health

## 2020-02-05 ENCOUNTER — TELEPHONE (OUTPATIENT)
Dept: PALLATIVE CARE | Age: 55
End: 2020-02-05

## 2020-02-05 NOTE — TELEPHONE ENCOUNTER
Returned call to Kamaljit Ro that Dr Willian Reyes has reached out to pateint and significant other , v/m left for both.  Patient unable to leave home any longer, palliative is no longer accepting home patients, patient has [facility] and has exhausted all his transportation benefits with insurance and patient now is longer able to leave the home with out stretcher transport, Dr Willian Reyes wnts to speak with patient regarding hospice , Kelly Kathleen aware

## 2020-02-05 NOTE — TELEPHONE ENCOUNTER
Danna Houston called from [facility] and she was wondering if you had a moment to follow-up with the patient.

## 2020-02-05 NOTE — TELEPHONE ENCOUNTER
Palliative Medicine  Nursing Note  [phone])  Fax [phone]      Telephone Call  Patient Name: Lauren Jordan  YOB: 1965    2/5/2020        Primary Decision Maker: Do Ibanez [facility] - [phone]    Secondary Decision Maker: Torin Johns - [phone]   Advance Care Planning 11/11/2019   Patient's Healthcare Decision Maker is: Named in scanned ACP document   Confirm Advance Directive Yes, on file       Returned call to Ms. Anna Rivera with [facility]. She shared that she has been speaking with Nicky TODD/ Palliative Medicine several times this week regarding patient and would like to follow up with her. Message provided to Nicky to call Ms. Britt.     Marcin Lackey RN  Palliative Medicine

## 2020-02-14 ENCOUNTER — TELEPHONE (OUTPATIENT)
Dept: ONCOLOGY | Age: 55
End: 2020-02-14

## 2020-02-14 DIAGNOSIS — C61 PROSTATE CANCER METASTATIC TO MULTIPLE SITES (HCC): ICD-10-CM

## 2020-02-14 RX ORDER — ABIRATERONE ACETATE 250 MG/1
1000 TABLET ORAL DAILY
Qty: 120 TAB | Refills: 11 | Status: SHIPPED | OUTPATIENT
Start: 2020-02-14

## 2020-02-14 NOTE — TELEPHONE ENCOUNTER
[facility] and verified the patient's ID x 2. Allison stated that the generic for Guerda Travis is only approved in the 250 mg strength. Informed Allison that per Elyssa Oliver NP a prescription for    Requested Prescriptions     Signed Prescriptions Disp Refills    abiraterone 250 mg tab 120 Tab 11     Sig: Take 4 Tabs by mouth daily. Authorizing Provider: Ramone Soto     Ordering User: Jaimee Peñaloza     will be sent electronically to [facility] [place] [address] verbalized understanding and denied any questions or concerns.

## 2020-02-14 NOTE — TELEPHONE ENCOUNTER
Allison from [facility] called to state the pt needs a new script for Zytiga that shows the correct dosage.  Best contact  [phone]

## 2020-02-18 ENCOUNTER — TELEPHONE (OUTPATIENT)
Dept: ONCOLOGY | Age: 55
End: 2020-02-18

## 2020-02-27 ENCOUNTER — TELEPHONE (OUTPATIENT)
Dept: ONCOLOGY | Age: 55
End: 2020-02-27

## 2020-02-27 NOTE — TELEPHONE ENCOUNTER
Roman Dominguez said he will follow the patient in hospice. They will fax over orders. [facility].

## 2020-02-27 NOTE — TELEPHONE ENCOUNTER
Called re   following patient in Hospice. I explained that he does not do this and she said the Dr at the facility would like to discuss coordination of  his care. Please call to see what they need to know.

## 2020-03-18 ENCOUNTER — DOCUMENTATION ONLY (OUTPATIENT)
Dept: ONCOLOGY | Age: 55
End: 2020-03-18

## 2020-03-18 NOTE — PROGRESS NOTES
[facility]  Social Work Navigator Encounter     Patient Name:  Natalia Nguyen Elias    Medical History: dx metastatic prostate    Advance Directives:    Narrative: per [facility] note on 3/6/20 - pt being released to inpatient hospice [facility]      Barriers to Care:     Assessment/Action:    Plan/Referral:

## 2021-10-29 NOTE — TELEPHONE ENCOUNTER
Called patient to advise/confirm upcoming appt with Dr. Brian Shore on    12/11/19  at 8:30am at [address] Pt states he has appt with [facility] at 9:30am and I advised him if he gets here at 8:30am he would be done and volunteer could take him upstairs. Advised patient to bring in ALL his medications. Pt voiced understanding. Also advised to please bring in your Drivers License and Insurance Card with you to appointment as well as any prescription pain medication in the original container with you to appt. No
